# Patient Record
Sex: FEMALE | Race: WHITE | NOT HISPANIC OR LATINO | Employment: FULL TIME | URBAN - METROPOLITAN AREA
[De-identification: names, ages, dates, MRNs, and addresses within clinical notes are randomized per-mention and may not be internally consistent; named-entity substitution may affect disease eponyms.]

---

## 2017-02-21 ENCOUNTER — CONSULT (OUTPATIENT)
Dept: BARIATRICS/WEIGHT MGMT | Facility: CLINIC | Age: 62
End: 2017-02-21

## 2017-02-21 ENCOUNTER — APPOINTMENT (OUTPATIENT)
Dept: LAB | Facility: HOSPITAL | Age: 62
End: 2017-02-21

## 2017-02-21 VITALS
TEMPERATURE: 98.6 F | WEIGHT: 269 LBS | HEART RATE: 76 BPM | BODY MASS INDEX: 44.82 KG/M2 | RESPIRATION RATE: 16 BRPM | DIASTOLIC BLOOD PRESSURE: 82 MMHG | HEIGHT: 65 IN | SYSTOLIC BLOOD PRESSURE: 133 MMHG

## 2017-02-21 DIAGNOSIS — E66.9 DIABETES MELLITUS TYPE 2 IN OBESE (HCC): ICD-10-CM

## 2017-02-21 DIAGNOSIS — K21.9 GASTROESOPHAGEAL REFLUX DISEASE, ESOPHAGITIS PRESENCE NOT SPECIFIED: ICD-10-CM

## 2017-02-21 DIAGNOSIS — E66.01 OBESITY, CLASS III, BMI 40-49.9 (MORBID OBESITY) (HCC): Primary | ICD-10-CM

## 2017-02-21 DIAGNOSIS — G47.33 OSA (OBSTRUCTIVE SLEEP APNEA): ICD-10-CM

## 2017-02-21 DIAGNOSIS — E03.9 HYPOTHYROIDISM, UNSPECIFIED TYPE: ICD-10-CM

## 2017-02-21 DIAGNOSIS — M25.50 MULTIPLE JOINT PAIN: ICD-10-CM

## 2017-02-21 DIAGNOSIS — E11.69 DIABETES MELLITUS TYPE 2 IN OBESE (HCC): ICD-10-CM

## 2017-02-21 DIAGNOSIS — R60.9 EDEMA, UNSPECIFIED TYPE: ICD-10-CM

## 2017-02-21 DIAGNOSIS — R53.82 CHRONIC FATIGUE: ICD-10-CM

## 2017-02-21 DIAGNOSIS — K44.9 HIATAL HERNIA: ICD-10-CM

## 2017-02-21 DIAGNOSIS — Z87.11 HISTORY OF GASTRIC ULCER: ICD-10-CM

## 2017-02-21 PROBLEM — F32.A DEPRESSION: Status: ACTIVE | Noted: 2017-02-21

## 2017-02-21 PROBLEM — I10 ESSENTIAL HYPERTENSION: Status: ACTIVE | Noted: 2017-02-21

## 2017-02-21 PROBLEM — E66.813 OBESITY, CLASS III, BMI 40-49.9 (MORBID OBESITY): Status: ACTIVE | Noted: 2017-02-21

## 2017-02-21 LAB
ALBUMIN SERPL-MCNC: 4.3 G/DL (ref 3.5–5.2)
ALBUMIN/GLOB SERPL: 1.3 G/DL
ALP SERPL-CCNC: 80 U/L (ref 39–117)
ALT SERPL W P-5'-P-CCNC: 17 U/L (ref 1–33)
ANION GAP SERPL CALCULATED.3IONS-SCNC: 13.3 MMOL/L
AST SERPL-CCNC: 18 U/L (ref 1–32)
BASOPHILS # BLD AUTO: 0.03 10*3/MM3 (ref 0–0.2)
BASOPHILS NFR BLD AUTO: 0.4 % (ref 0–1.5)
BILIRUB SERPL-MCNC: 0.4 MG/DL (ref 0.1–1.2)
BUN BLD-MCNC: 9 MG/DL (ref 6–20)
BUN/CREAT SERPL: 11.7 (ref 7–25)
CALCIUM SPEC-SCNC: 9.7 MG/DL (ref 8.6–10.5)
CHLORIDE SERPL-SCNC: 99 MMOL/L (ref 98–107)
CHOLEST SERPL-MCNC: 221 MG/DL (ref 0–200)
CO2 SERPL-SCNC: 26.7 MMOL/L (ref 22–29)
CREAT BLD-MCNC: 0.77 MG/DL (ref 0.57–1)
DEPRECATED RDW RBC AUTO: 45.5 FL (ref 37–54)
EOSINOPHIL # BLD AUTO: 0.13 10*3/MM3 (ref 0–0.7)
EOSINOPHIL NFR BLD AUTO: 1.9 % (ref 0.3–6.2)
ERYTHROCYTE [DISTWIDTH] IN BLOOD BY AUTOMATED COUNT: 13.9 % (ref 11.7–13)
GFR SERPL CREATININE-BSD FRML MDRD: 77 ML/MIN/1.73
GLOBULIN UR ELPH-MCNC: 3.2 GM/DL
GLUCOSE BLD-MCNC: 86 MG/DL (ref 65–99)
HBA1C MFR BLD: 5.46 % (ref 4.8–5.6)
HCT VFR BLD AUTO: 43.6 % (ref 35.6–45.5)
HDLC SERPL-MCNC: 70 MG/DL (ref 40–60)
HGB BLD-MCNC: 14.2 G/DL (ref 11.9–15.5)
IMM GRANULOCYTES # BLD: 0.03 10*3/MM3 (ref 0–0.03)
IMM GRANULOCYTES NFR BLD: 0.4 % (ref 0–0.5)
LDLC SERPL CALC-MCNC: 130 MG/DL (ref 0–100)
LDLC/HDLC SERPL: 1.85 {RATIO}
LYMPHOCYTES # BLD AUTO: 1.52 10*3/MM3 (ref 0.9–4.8)
LYMPHOCYTES NFR BLD AUTO: 22.4 % (ref 19.6–45.3)
MCH RBC QN AUTO: 29.5 PG (ref 26.9–32)
MCHC RBC AUTO-ENTMCNC: 32.6 G/DL (ref 32.4–36.3)
MCV RBC AUTO: 90.6 FL (ref 80.5–98.2)
MONOCYTES # BLD AUTO: 0.44 10*3/MM3 (ref 0.2–1.2)
MONOCYTES NFR BLD AUTO: 6.5 % (ref 5–12)
NEUTROPHILS # BLD AUTO: 4.65 10*3/MM3 (ref 1.9–8.1)
NEUTROPHILS NFR BLD AUTO: 68.4 % (ref 42.7–76)
PLATELET # BLD AUTO: 259 10*3/MM3 (ref 140–500)
PMV BLD AUTO: 11.3 FL (ref 6–12)
POTASSIUM BLD-SCNC: 4.2 MMOL/L (ref 3.5–5.2)
PROT SERPL-MCNC: 7.5 G/DL (ref 6–8.5)
RBC # BLD AUTO: 4.81 10*6/MM3 (ref 3.9–5.2)
SODIUM BLD-SCNC: 139 MMOL/L (ref 136–145)
TRIGL SERPL-MCNC: 106 MG/DL (ref 0–150)
TSH SERPL DL<=0.05 MIU/L-ACNC: 1.98 MIU/ML (ref 0.27–4.2)
VLDLC SERPL-MCNC: 21.2 MG/DL (ref 5–40)
WBC NRBC COR # BLD: 6.8 10*3/MM3 (ref 4.5–10.7)

## 2017-02-21 PROCEDURE — 99205 OFFICE O/P NEW HI 60 MIN: CPT | Performed by: NURSE PRACTITIONER

## 2017-02-21 PROCEDURE — 84443 ASSAY THYROID STIM HORMONE: CPT | Performed by: NURSE PRACTITIONER

## 2017-02-21 PROCEDURE — 36415 COLL VENOUS BLD VENIPUNCTURE: CPT | Performed by: NURSE PRACTITIONER

## 2017-02-21 PROCEDURE — 80061 LIPID PANEL: CPT | Performed by: NURSE PRACTITIONER

## 2017-02-21 PROCEDURE — 83036 HEMOGLOBIN GLYCOSYLATED A1C: CPT | Performed by: NURSE PRACTITIONER

## 2017-02-21 PROCEDURE — 94690 O2 UPTK REST INDIRECT: CPT | Performed by: NURSE PRACTITIONER

## 2017-02-21 PROCEDURE — 80053 COMPREHEN METABOLIC PANEL: CPT | Performed by: NURSE PRACTITIONER

## 2017-02-21 PROCEDURE — 85025 COMPLETE CBC W/AUTO DIFF WBC: CPT | Performed by: NURSE PRACTITIONER

## 2017-02-21 RX ORDER — DULAGLUTIDE 1.5 MG/.5ML
1 INJECTION, SOLUTION SUBCUTANEOUS WEEKLY
COMMUNITY
Start: 2017-01-25 | End: 2017-07-13

## 2017-02-21 NOTE — PROGRESS NOTES
[facility]IATRIC SURGERY  [address]  [place] [address]  [address]  [place] [address]  Dept: [phone]  2/21/2017      Angelina Narayan.  [id number]  [id number]  1955  female      Chief Complaint of weight gain; unable to maintain weight loss    History of Present Illness:   Angelina is a 61 y.o. female who presents today for evaluation, education and consultation regarding weight loss surgery. The patient is interested in the sleeve gastrectomy.      Diet History:Angelina has been overweight for at least 30 years, has been 35 pounds or more overweight for at least 40 years, has been 100 pounds or more overweight for 25 or more years and started dieting at age 32.  The most weight Angelina lost was 50 pounds on WW and maintained the weight loss for 3 months. Angelina describes her eating habits as snacker and sweets eater. Angelina Narayan has tried Atkins, Nutrisystem, [place], Weight Watchers, Fasting and prescription medications among others with success of losing up to 50 pounds, but in each instance regained the weight.    See dietician documentation for complete history.    Bariatric Surgery Evaluation: The patient is being seen for an initial visit for bariatric surgery evaluation.     Bariatric Co-morbidities:  sleep apnea, diabetes, GERD, edema and depression    Patient Active Problem List   Diagnosis   • Obesity, Class III, BMI 40-49.9 (morbid obesity)   • RIVERA (obstructive sleep apnea)   • GERD (gastroesophageal reflux disease)   • Diabetes mellitus type 2 in obese   • Chronic fatigue   • Edema   • Hiatal hernia   • History of gastric ulcer   • Multiple joint pain   • Depression   • Hypothyroidism       Past Medical History   Diagnosis Date   • Anemia    • Constipation    • Diabetes mellitus    • Fatigue    • Fatigue    • GERD (gastroesophageal reflux disease)    • Heartburn    • Hiatal hernia    • Joint pain    • RIVERA (obstructive sleep  apnea)    • Parathyroid disease    • RLS (restless legs syndrome)        Past Surgical History   Procedure Laterality Date   • Hysterectomy     • Tubal abdominal ligation     • Tonsillectomy     • Endoscopy     •  section     • Laparoscopic cholecystectomy     • Colonoscopy     • Dilatation and curettage     • Skin graft         Allergies   Allergen Reactions   • Adhesive Tape    • Morphine And Related    • Sulfa Antibiotics          Current Outpatient Prescriptions:   •  cetirizine (zyrTEC) 10 MG tablet, Take 10 mg by mouth Daily., Disp: , Rfl:   •  hydroxychloroquine (PLAQUENIL) 200 MG tablet, Take  by mouth Daily., Disp: , Rfl:   •  Ibuprofen (ADVIL PO), Take  by mouth., Disp: , Rfl:   •  levothyroxine (SYNTHROID, LEVOTHROID) 100 MCG tablet, Take  by mouth Daily., Disp: , Rfl:   •  lisinopril (PRINIVIL,ZESTRIL) 5 MG tablet, Take 5 mg by mouth Daily., Disp: , Rfl:   •  omeprazole (priLOSEC) 40 MG capsule, Take  by mouth Daily., Disp: , Rfl:   •  TRULICITY 1.5 MG/0.5ML solution pen-injector, Inject 1 application under the skin Take As Directed., Disp: , Rfl:     Social History     Social History   • Marital status:      Spouse name: N/A   • Number of children: 3   • Years of education: N/A     Occupational History   • Not on file.     Social History Main Topics   • Smoking status: Never Smoker   • Smokeless tobacco: Not on file   • Alcohol use No   • Drug use: No   • Sexual activity: Defer     Other Topics Concern   • Not on file     Social History Narrative       Family History   Problem Relation Age of Onset   • Obesity Mother    • Diabetes Mother    • Hypertension Mother    • Sleep apnea Mother    • Hypertension Father    • Stroke Father    • Heart disease Father    • Hypertension Brother    • Heart attack Brother    • Heart disease Brother    • Sleep apnea Brother    • Cancer Brother    • Heart attack Paternal Grandmother    • Breast cancer Maternal Aunt          Review of Systems:  Review of  Systems   All other systems reviewed and are negative.      Physical Exam:  Vital Signs:  Weight: 269 lb (122 kg)   Body mass index is 44.76 kg/(m^2).  Temp: 98.6 °F (37 °C)   Heart Rate: 76   BP: 133/82     Physical Exam   Constitutional: She is oriented to person, place, and time. She appears well-developed and well-nourished.   HENT:   Head: Normocephalic and atraumatic.   Eyes: EOM are normal.   Cardiovascular: Normal rate, regular rhythm and normal heart sounds.    Pulmonary/Chest: Effort normal and breath sounds normal.   Abdominal: Soft. Bowel sounds are normal. She exhibits no distension. There is no tenderness.   Musculoskeletal: Normal range of motion.   Neurological: She is alert and oriented to person, place, and time.   Skin: Skin is warm and dry.   Psychiatric: She has a normal mood and affect. Her behavior is normal. Judgment and thought content normal.   Vitals reviewed.         Assessment:         Angelina Narayan is a 61 y.o. year old female with medically complicated severe obesity. Weight: 269 lb (122 kg), Body mass index is 44.76 kg/(m^2). and weight related problems including sleep apnea, diabetes, GERD, edema and depression.    I explained in detail the procedures that we are performing.  All of those procedures can be performed laparoscopically but there is a chance to convert to open if any technical challenges or complications do occur.  Bariatric surgery is not cosmetic surgery but rather a tool to help a patient make a life-long commitment lifestyle changes including diet, exercise, behavior changes, and taking supplemental vitamins and minerals.    Due to the patient's BMI and co-morbidities they are at a high risk for surgery and will obtain the following:  The patient has been advised that a letter of medical support and a history and physical must be obtained from her primary care physician. A psychological evaluation will be arranged for this patient. CBC, CMP, FLP, TSH and HgbA1C will be  drawn. Angelina Narayan will obtain a pre-operative CXR and EKG.     Angelina Narayan will be set up for a pre-operative diagnostic esophagogastroduodenoscopy with biopsy for evaluation. The risks and benefits of the procedure were discussed with the patient in detail and all questions were answered.  Possibility of perforation, bleeding, aspiration, anoxic brain injury, respiratory and/or cardiac arrest and death were discussed.   She received handouts regarding, all questions were answered and informed consent was obtained.     The risks, benefits, alternatives, and potential complications of all of the procedures were explained in detail including, but not limited to death, anesthesia and medication adverse effect/DVT, pulmonary embolism, trocar site/incisional hernia, wound infection, abdominal infection, bleeding, failure to lose weight or gain weight and change in body image, metabolic complications with calcium, thiamine, vitamin B12, folate, iron, and anemia.    The patient was advised to start a high protein, low fat and low carbohydrate diet. The patient was given individualized information by our dietician along with general group information and handouts.     If the patient had the Basal Metabolic Rate test performed in our office today then I reviewed the results with them including changes to help increase metabolism and a recommended daily caloric intake range- see scanned results.    The patient was given information regarding the JOSE M educational video. JOSE M is an internet based educational video which explains the surgical procedure and answers basic questions regarding the procedure. The patient was provided with instructions and a password to watch the video.    The patient was encouraged to start routine exercise including but not limited to 150 minutes per week. The patient received a resistance band along with a handout of exercises.     The consultation plan was reviewed with the patient.    The patient  understands the surgical procedures and the different surgical options that are available.  She understands the lifestyle changes that would be required after surgery and has agreed to participate in a pre-operative and postoperative weight management program.  She also expressed understanding of possible risks, had several questions answered and desires to proceed.    I think she is a good candidate for this surgery, and is interested in a sleeve gastrectomy.    Plan:    Patient will have evaluations and follow up with bariatric dieticians and a psychologist before undergoing a multidisciplinary review of her candidacy.  We also discussed the weight loss requirement and rationale, and other program requirements.      Kirsten Peterson, APRN  2/21/2017

## 2017-02-21 NOTE — PROGRESS NOTES
"Bariatric Nutrition Counseling Interview    Patient Name:  Angelina Narayan  YOB: 1955  Age:  61 y.o.  Sex:  female  MRN: [id number]  Date:  17    Procedure Considering:  Sleeve    Last Documented Height:    Ht Readings from Last 1 Encounters:   17 65\" (165.1 cm)     Last Documented Weight:   Wt Readings from Last 1 Encounters:   17 269 lb (122 kg)      Body mass index is 44.76 kg/(m^2).    Highest Weight:  287 lbs.  Goal Weight: 145 lb.    History:  Past Medical History   Diagnosis Date   • Constipation    • Diabetes mellitus    • Fatigue    • Fatigue    • GERD (gastroesophageal reflux disease)    • Heartburn    • Hiatal hernia    • Joint pain    • RIVERA (obstructive sleep apnea)    • Parathyroid disease    • RLS (restless legs syndrome)      Past Surgical History   Procedure Laterality Date   • Cholecystectomy     • Hysterectomy     • Tubal abdominal ligation     • Tonsillectomy     • Endoscopy     •  section       Family History   Problem Relation Age of Onset   • Obesity Mother    • Diabetes Mother    • Hypertension Mother    • Sleep apnea Mother    • Hypertension Father    • Stroke Father    • Heart disease Father    • Hypertension Brother    • Heart attack Brother    • Heart disease Brother    • Sleep apnea Brother    • Cancer Brother    • Heart attack Paternal Grandmother    • Breast cancer Maternal Aunt      Social History     Social History   • Marital status:      Spouse name: N/A   • Number of children: N/A   • Years of education: N/A     Social History Main Topics   • Smoking status: Never Smoker   • Smokeless tobacco: Not on file   • Alcohol use No   • Drug use: Not on file   • Sexual activity: Not on file     Other Topics Concern   • Not on file     Social History Narrative     Additional Health Issues to Consider:  Depression, GERD, Diabetes,Hypothyroidism, Hyperlipidemia,joint pain, RA.    Weight History:  Weight gain as a result of an event or condition.Angelina" gained weight following her third pregnanct and hysterectomy    Previous Weight Loss Efforts:  Weight Watchers, Nutrisystem, Phentermine, OTC weight loss aids, My FItness Pal  Most Successful Weight Loss Effort:  Eating Habits: My Fitness Pal  Eat three meals on most days? yes  Worst eating habit?  Snacking on high calorie foods    How often do you eat fast food? weekly    Do you exercise regularly? (at least 3 times each week)  no    Occupation:  , includes some physical activity on a routine basis    Personal Goal After Procedure:  Reduce medications, resolve Diabetes, have more energy to work outsidework outside   Personal Support:        Assessment:  We reviewed program requirements for weight loss following surgery. We discussed personal habits and lifestyle behaviors that may influence weight loss efforts. Program materials, including a reduced calorie diet were provided, discussed and recommended as the regimen to follow for pre and post diet planning. Pat demonstrated a good comprehension of diet requirements as well as a commitment to work on personal challenges. She will make a good candidate for weight loss surgery  Electronically signed by:  Marly Aquino RD  02/21/17 12:59 PM

## 2017-03-21 PROCEDURE — 88305 TISSUE EXAM BY PATHOLOGIST: CPT | Performed by: SURGERY

## 2017-03-21 PROCEDURE — 88312 SPECIAL STAINS GROUP 1: CPT | Performed by: SURGERY

## 2017-03-22 ENCOUNTER — OUTSIDE FACILITY SERVICE (OUTPATIENT)
Dept: BARIATRICS/WEIGHT MGMT | Facility: CLINIC | Age: 62
End: 2017-03-22

## 2017-03-22 ENCOUNTER — LAB REQUISITION (OUTPATIENT)
Dept: LAB | Facility: HOSPITAL | Age: 62
End: 2017-03-22

## 2017-03-22 DIAGNOSIS — K21.9 GASTRO-ESOPHAGEAL REFLUX DISEASE WITHOUT ESOPHAGITIS: ICD-10-CM

## 2017-03-22 PROCEDURE — 43239 EGD BIOPSY SINGLE/MULTIPLE: CPT | Performed by: SURGERY

## 2017-03-22 PROCEDURE — 87081 CULTURE SCREEN ONLY: CPT | Performed by: SURGERY

## 2017-03-23 LAB
CYTO UR: NORMAL
LAB AP CASE REPORT: NORMAL
LAB AP CLINICAL INFORMATION: NORMAL
Lab: NORMAL
PATH REPORT.FINAL DX SPEC: NORMAL
PATH REPORT.GROSS SPEC: NORMAL
UREASE TISS QL: NEGATIVE

## 2017-04-24 ENCOUNTER — HOSPITAL ENCOUNTER (OUTPATIENT)
Dept: GENERAL RADIOLOGY | Facility: HOSPITAL | Age: 62
Discharge: HOME | End: 2017-04-24
Admitting: NURSE PRACTITIONER

## 2017-04-24 ENCOUNTER — HOSPITAL ENCOUNTER (OUTPATIENT)
Dept: CARDIOLOGY | Facility: HOSPITAL | Age: 62
Discharge: HOME | End: 2017-04-24

## 2017-04-24 ENCOUNTER — HOSPITAL ENCOUNTER (OUTPATIENT)
Dept: CARDIOLOGY | Facility: HOSPITAL | Age: 62
End: 2017-04-24

## 2017-04-24 DIAGNOSIS — Z87.11 HISTORY OF GASTRIC ULCER: ICD-10-CM

## 2017-04-24 DIAGNOSIS — G47.33 OSA (OBSTRUCTIVE SLEEP APNEA): ICD-10-CM

## 2017-04-24 DIAGNOSIS — E11.69 DIABETES MELLITUS TYPE 2 IN OBESE (HCC): ICD-10-CM

## 2017-04-24 DIAGNOSIS — K21.9 GASTROESOPHAGEAL REFLUX DISEASE, ESOPHAGITIS PRESENCE NOT SPECIFIED: ICD-10-CM

## 2017-04-24 DIAGNOSIS — M25.50 MULTIPLE JOINT PAIN: ICD-10-CM

## 2017-04-24 DIAGNOSIS — K44.9 HIATAL HERNIA: ICD-10-CM

## 2017-04-24 DIAGNOSIS — E66.01 OBESITY, CLASS III, BMI 40-49.9 (MORBID OBESITY) (HCC): ICD-10-CM

## 2017-04-24 DIAGNOSIS — E03.9 HYPOTHYROIDISM, UNSPECIFIED TYPE: ICD-10-CM

## 2017-04-24 DIAGNOSIS — R60.9 EDEMA, UNSPECIFIED TYPE: ICD-10-CM

## 2017-04-24 DIAGNOSIS — E66.9 DIABETES MELLITUS TYPE 2 IN OBESE (HCC): ICD-10-CM

## 2017-04-24 DIAGNOSIS — R53.82 CHRONIC FATIGUE: ICD-10-CM

## 2017-04-24 PROCEDURE — 93010 ELECTROCARDIOGRAM REPORT: CPT | Performed by: INTERNAL MEDICINE

## 2017-04-24 PROCEDURE — 93005 ELECTROCARDIOGRAM TRACING: CPT | Performed by: NURSE PRACTITIONER

## 2017-04-24 PROCEDURE — 71020 HC CHEST PA AND LATERAL: CPT

## 2017-05-02 ENCOUNTER — HOSPITAL ENCOUNTER (OUTPATIENT)
Dept: SLEEP MEDICINE | Facility: HOSPITAL | Age: 62
Discharge: HOME | End: 2017-05-02
Admitting: INTERNAL MEDICINE

## 2017-05-02 PROCEDURE — G0463 HOSPITAL OUTPT CLINIC VISIT: HCPCS

## 2017-05-19 ENCOUNTER — PREP FOR SURGERY (OUTPATIENT)
Dept: BARIATRICS/WEIGHT MGMT | Facility: CLINIC | Age: 62
End: 2017-05-19

## 2017-05-19 DIAGNOSIS — E66.01 OBESITY, CLASS III, BMI 40-49.9 (MORBID OBESITY) (HCC): Primary | ICD-10-CM

## 2017-05-19 DIAGNOSIS — K44.9 HIATAL HERNIA: ICD-10-CM

## 2017-05-19 RX ORDER — SCOLOPAMINE TRANSDERMAL SYSTEM 1 MG/1
1 PATCH, EXTENDED RELEASE TRANSDERMAL ONCE
Status: CANCELLED | OUTPATIENT
Start: 2017-05-19 | End: 2017-05-19

## 2017-05-19 RX ORDER — SODIUM CHLORIDE 0.9 % (FLUSH) 0.9 %
1-10 SYRINGE (ML) INJECTION AS NEEDED
Status: CANCELLED | OUTPATIENT
Start: 2017-05-19

## 2017-05-19 RX ORDER — CHLORHEXIDINE GLUCONATE 0.12 MG/ML
15 RINSE ORAL SEE ADMIN INSTRUCTIONS
Status: CANCELLED | OUTPATIENT
Start: 2017-05-19

## 2017-05-19 RX ORDER — SODIUM CHLORIDE, SODIUM LACTATE, POTASSIUM CHLORIDE, CALCIUM CHLORIDE 600; 310; 30; 20 MG/100ML; MG/100ML; MG/100ML; MG/100ML
100 INJECTION, SOLUTION INTRAVENOUS CONTINUOUS
Status: CANCELLED | OUTPATIENT
Start: 2017-05-19

## 2017-05-19 RX ORDER — PANTOPRAZOLE SODIUM 40 MG/10ML
40 INJECTION, POWDER, LYOPHILIZED, FOR SOLUTION INTRAVENOUS ONCE
Status: CANCELLED | OUTPATIENT
Start: 2017-05-19 | End: 2017-05-19

## 2017-06-01 ENCOUNTER — CONSULT (OUTPATIENT)
Dept: BARIATRICS/WEIGHT MGMT | Facility: CLINIC | Age: 62
End: 2017-06-01

## 2017-06-01 VITALS
DIASTOLIC BLOOD PRESSURE: 84 MMHG | RESPIRATION RATE: 16 BRPM | BODY MASS INDEX: 45.32 KG/M2 | HEIGHT: 65 IN | SYSTOLIC BLOOD PRESSURE: 135 MMHG | HEART RATE: 80 BPM | WEIGHT: 272 LBS | TEMPERATURE: 98.7 F

## 2017-06-01 DIAGNOSIS — K44.9 HIATAL HERNIA: ICD-10-CM

## 2017-06-01 DIAGNOSIS — K31.7 GASTRIC POLYPS: ICD-10-CM

## 2017-06-01 DIAGNOSIS — R53.82 CHRONIC FATIGUE: ICD-10-CM

## 2017-06-01 DIAGNOSIS — E11.69 DIABETES MELLITUS TYPE 2 IN OBESE (HCC): ICD-10-CM

## 2017-06-01 DIAGNOSIS — G47.33 OSA (OBSTRUCTIVE SLEEP APNEA): ICD-10-CM

## 2017-06-01 DIAGNOSIS — E66.9 DIABETES MELLITUS TYPE 2 IN OBESE (HCC): ICD-10-CM

## 2017-06-01 DIAGNOSIS — E66.01 OBESITY, CLASS III, BMI 40-49.9 (MORBID OBESITY) (HCC): Primary | ICD-10-CM

## 2017-06-01 DIAGNOSIS — M25.50 MULTIPLE JOINT PAIN: ICD-10-CM

## 2017-06-01 DIAGNOSIS — K21.9 GASTROESOPHAGEAL REFLUX DISEASE WITHOUT ESOPHAGITIS: ICD-10-CM

## 2017-06-01 PROCEDURE — 99215 OFFICE O/P EST HI 40 MIN: CPT | Performed by: SURGERY

## 2017-06-01 RX ORDER — FOLIC ACID 1 MG/1
1 TABLET ORAL DAILY
Qty: 40 TABLET | Refills: 0 | Status: SHIPPED | OUTPATIENT
Start: 2017-06-01 | End: 2017-12-06

## 2017-06-01 RX ORDER — ACETAMINOPHEN,DIPHENHYDRAMINE HCL 500; 25 MG/1; MG/1
1 TABLET, FILM COATED ORAL NIGHTLY PRN
COMMUNITY
End: 2017-06-13 | Stop reason: HOSPADM

## 2017-06-01 RX ORDER — GABAPENTIN 600 MG/1
600 TABLET ORAL NIGHTLY
COMMUNITY
Start: 2017-05-02 | End: 2018-03-07

## 2017-06-01 NOTE — PATIENT INSTRUCTIONS
Bariatric Manual    You were provided a manual specific to the procedure that you have chosen.  Please refer to that with any questions or call the office at [phone]

## 2017-06-01 NOTE — H&P
Bariatric Consult:  Referred by WALT Hatfield    Angelina Narayan is here today for consult on Consult (Morbid obesity with co morbidities who presents for gastric sleeve surgery consult)      History of Present Illness:     Angelina Narayan is a 61 y.o. female with morbid obesity with co-morbidities who presents for surgical consultation for the above procedure. Angelina has completed the initial intake visit and has been examined by our nurse practitioner, dietician, psychologist and underwent the extensive educational teaching process under the guidance of our bariatric coordinator and myself. Angelina also has seen the educational video JOSE M on the surgical procedure if available. Angelina attended today more educational teaching from our bariatric coordinator and myself. Angelina has had an extensive medical workup including a visit with their primary care physician, EKG, chest radiograph, blood work, EGD or UGI and possibly further testing. These have been reviewed by me and discussed with the patient. Angelina is now ready to proceed with surgery. Angelina presently denies nausea, vomiting, fever, chills, chest pain, shortness of air, melena, hematochezia, hemetemesis, dysuria, frequency, hematuria, jaundice or abdominal pain.       Past Medical History:   Diagnosis Date   • Anemia    • Constipation    • Diabetes mellitus    • Fatigue    • Fatigue    • GERD (gastroesophageal reflux disease)    • Heartburn    • Hiatal hernia    • Joint pain    • RIVERA (obstructive sleep apnea)    • Parathyroid disease    • RLS (restless legs syndrome)        Encounter Diagnoses   Name Primary?   • Obesity, Class III, BMI 40-49.9 (morbid obesity) Yes   • Diabetes mellitus type 2 in obese    • Hiatal hernia    • Gastric polyps    • Multiple joint pain    • Chronic fatigue    • Gastroesophageal reflux disease without esophagitis    • RIVERA (obstructive sleep apnea)        Past Surgical History:   Procedure Laterality Date   •  SECTION     •  COLONOSCOPY     • DILATATION AND CURETTAGE     • ENDOSCOPY     • HYSTERECTOMY     • LAPAROSCOPIC CHOLECYSTECTOMY     • SKIN GRAFT     • TONSILLECTOMY     • TUBAL ABDOMINAL LIGATION         Patient Active Problem List   Diagnosis   • Obesity, Class III, BMI 40-49.9 (morbid obesity)   • RIVERA (obstructive sleep apnea)   • GERD (gastroesophageal reflux disease)   • Diabetes mellitus type 2 in obese   • Chronic fatigue   • Edema   • Hiatal hernia   • History of gastric ulcer   • Multiple joint pain   • Depression   • Hypothyroidism   • Gastric polyps       Allergies   Allergen Reactions   • Adhesive Tape    • Morphine And Related    • Sulfa Antibiotics          Current Outpatient Prescriptions:   •  cetirizine (zyrTEC) 10 MG tablet, Take 10 mg by mouth Daily., Disp: , Rfl:   •  diphenhydrAMINE-acetaminophen (TYLENOL PM)  MG tablet per tablet, Take 1 tablet by mouth At Night As Needed for Sleep., Disp: , Rfl:   •  hydroxychloroquine (PLAQUENIL) 200 MG tablet, Take  by mouth Daily., Disp: , Rfl:   •  levothyroxine (SYNTHROID, LEVOTHROID) 100 MCG tablet, Take  by mouth Daily., Disp: , Rfl:   •  lisinopril (PRINIVIL,ZESTRIL) 5 MG tablet, Take 5 mg by mouth Daily., Disp: , Rfl:   •  omeprazole (priLOSEC) 40 MG capsule, Take  by mouth Daily., Disp: , Rfl:   •  TRULICITY 1.5 MG/0.5ML solution pen-injector, Inject 1 application under the skin Take As Directed., Disp: , Rfl:   •  folic acid (FOLVITE) 1 MG tablet, Take 1 tablet by mouth Daily., Disp: 40 tablet, Rfl: 0  •  gabapentin (NEURONTIN) 600 MG tablet, , Disp: , Rfl:     Social History     Social History   • Marital status:      Spouse name: N/A   • Number of children: 3   • Years of education: N/A     Occupational History   • Not on file.     Social History Main Topics   • Smoking status: Never Smoker   • Smokeless tobacco: Not on file   • Alcohol use No   • Drug use: No   • Sexual activity: Defer     Other Topics Concern   • Not on file     Social History  Narrative       Family History   Problem Relation Age of Onset   • Obesity Mother    • Diabetes Mother    • Hypertension Mother    • Sleep apnea Mother    • Hypertension Father    • Stroke Father    • Heart disease Father    • Hypertension Brother    • Heart attack Brother    • Heart disease Brother    • Sleep apnea Brother    • Cancer Brother    • Heart attack Paternal Grandmother    • Breast cancer Maternal Aunt        Review of Systems:  Review of Systems   Constitutional: Positive for fatigue.   Musculoskeletal: Positive for arthralgias.   All other systems reviewed and are negative.        Physical Exam:    Vital Signs:  Weight: 272 lb (123 kg)   Body mass index is 45.26 kg/(m^2).  Temp: 98.7 °F (37.1 °C)   Heart Rate: 80   BP: 135/84       Physical Exam   Constitutional: She is oriented to person, place, and time. She appears well-nourished.   HENT:   Head: Normocephalic and atraumatic.   Mouth/Throat: Oropharynx is clear and moist.   Eyes: Conjunctivae and EOM are normal. Pupils are equal, round, and reactive to light. No scleral icterus.   Neck: Normal range of motion. Neck supple. No thyromegaly present.   Cardiovascular: Normal rate and regular rhythm.    Pulmonary/Chest: Effort normal and breath sounds normal.   Abdominal: Soft. Bowel sounds are normal. She exhibits no distension and no mass. There is no tenderness. There is no rebound and no guarding.   Musculoskeletal: Normal range of motion. She exhibits edema.   Lymphadenopathy:     She has no cervical adenopathy.   Neurological: She is alert and oriented to person, place, and time. No cranial nerve deficit. Coordination normal.   Skin: Skin is warm and dry. No erythema.   Psychiatric: She has a normal mood and affect. Her behavior is normal.   Vitals reviewed.        Assessment:    Angelina Narayan is a 61 y.o. year old female with medically complicated severe obesity with a BMI of Body mass index is 45.26 kg/(m^2). and multiple comorbidities.    I think  she is an appropriate candidate for this surgery, and is ready to proceed.      Plan/Discussion/Summary:  Hiatal Hernia per me.  Patient does take PPI.  Helicobacter pylori negative.  Gastric polyps benign      The patient has returned to the office for a surgical consultation and has requested to proceed with a laparoscopic gastric sleeve.  I have had the opportunity to obtain a history, examine the patient and review the patient's chart.    The patient understands that surgery is a tool and that weight loss is not guaranteed but only seen in the context of appropriate use, regular follow up, exercise and making appropriate food choices.     I personally discussed the potential complications of the laparoscopic gastric sleeve with this patient.  The patient is well aware of potential complications of the surgery that include but not limited to bleeding, infections, deep vein thrombosis, pulmonary embolism, pulmonary complications such as pneumonia, cardiac event, hernias, small bowel obstruction, damage to the spleen or other organs, bowel injury, disfiguring scars, failure to lose weight, need for additional surgery, conversion to an open procedure and death.  The patient is also aware of complications which apply in particular to the gastric sleeve and can include but not limited to the leakage of gastric contents at the staple line, the development of an intra-abdominal abscess, gastroesophageal reflux disease, Humphreys's esophagus, ulcers, vitamin/mineral deficiencies, strictures, and the possibility of converting this procedure to a Dmitriy-en-Y gastric bypass. The patient also understands the possibility of requiring an acid reducer medication for the rest of their life.    The risks, benefits, potential complications and alternative therapies were discussed at great length as outlined in our extensive consent forms, online consent and educational teaching processes.    The patient has confirmed the participation  in the programs extensive educational activities.    All questions and concerns were answered to patient's satisfaction.  The patient now wishes to proceed with surgery.    Patient has declined the pre-operative insertion of an IVC filter.     The patient has declined a postoperative course of anitcoagulant therapy.        I explained in detail the procedures that we perform.  All of these procedures have a chance to convert to open if any technical challenges or complications do occur.  Bariatric surgery is not cosmetic surgery but rather a tool to help a patient make a life-long commitment lifestyle change including diet, exercise, behavior changes, and taking supplemental vitamins and minerals.    Problems after surgery may require more operations to correct them.    The risks, benefits, alternatives, and potential complications of all of the procedures were explained in detail including, but not limited to death, anesthesia and medication adverse effect, deep venous thrombosis, pulmonary embolism, trocar site/incisional hernia, wound infection, abdominal infection, bleeding, failure to lose weight, gain weight, a change in body image, metabolic complications with vitamin deficiences and anemia.    Weight loss expectations were discussed with the patient in detail. The weight loss operations most commonly performed are the sleeve gastrectomy and the Dmitriy-en-Y gastric bypass. These operations result in weight losses up to approximately 25-35% of initial body weight 12 to 24 months after surgery with the gastric bypass usually the higher percent of weight loss. The longitudinal data shows maintenance of 75% of lost weight after 10 years for the gastric bypass.    For the gastric bypass and loop duodenal switch (BRAD-S) the risks include but not limited to the following early complications:  Anastomotic leak/peritonitis, Dmitriy/Alimentary/biliopancreatic limb obstruction, severe & minor wound infection/seroma, and  nausea/vomiting.  Late complications can include but are not limited to malnutrition, vitamin deficiencies, frequent loose stools,  stomal stenosis, marginal ulcer, bowel obstruction, intussusception, internal, and incisional hernia.    Regarding the gastric sleeve, there is less long-term outcome data and higher risk of dysphagia and reflux compared to a gastric bypass, as well as risk of internal visceral/organ injury, splenectomy, bleeding, infection, leak (which could require further intervention possible conversion to Dmitriy-en-Y gastric bypass), stenosis and possibility of regaining weight.    Angelina was counseled regarding diagnostic results, instructions for management, risk factor reductions, prognosis, patient and family education, impressions, risks and benefits of treatment options and importance of compliance with treatment. Total face to face time of the encounter was over 45 minutes and over 30 minutes was spent counseling.     Jessie Report   As part of this patient's treatment plan I am prescribing controlled substances. The patient has been made aware of appropriate use of such medications, including potential risk of somnolence, limited ability to drive and /or work safely, and potential for dependence or overdose. It has also been made clear that these medications are for use by this patient only, without concomitant use of alcohol or other substances unless prescribed.    Angelina has completed prescribing agreement detailing terms of continued prescribing of controlled substances, including monitoring JESSIE reports, urine drug screening, and pill counts if necessary. Angelina is aware that inappropriate use will result in cessation of prescribing such medications.    JESSIE report has been reviewed      History and physical exam exhibit continued safe and appropriate use of controlled substances.      Angelina understands the surgical procedures and the different surgical options that are available.  She  understands the lifestyle changes that are required after surgery and has agreed to follow the guidelines outlined in the weight management program.  She also expressed understanding of the risks involved and had all of female questions answered and desires to proceed.      Job Douglass MD  6/1/2017

## 2017-06-05 ENCOUNTER — APPOINTMENT (OUTPATIENT)
Dept: PREADMISSION TESTING | Facility: HOSPITAL | Age: 62
End: 2017-06-05

## 2017-06-05 VITALS
HEIGHT: 65 IN | SYSTOLIC BLOOD PRESSURE: 103 MMHG | OXYGEN SATURATION: 96 % | TEMPERATURE: 97.7 F | RESPIRATION RATE: 16 BRPM | DIASTOLIC BLOOD PRESSURE: 74 MMHG | HEART RATE: 76 BPM

## 2017-06-05 DIAGNOSIS — K44.9 HIATAL HERNIA: ICD-10-CM

## 2017-06-05 DIAGNOSIS — E66.01 OBESITY, CLASS III, BMI 40-49.9 (MORBID OBESITY) (HCC): ICD-10-CM

## 2017-06-05 LAB
ALBUMIN SERPL-MCNC: 4.4 G/DL (ref 3.5–5.2)
ALBUMIN/GLOB SERPL: 1.3 G/DL
ALP SERPL-CCNC: 72 U/L (ref 39–117)
ALT SERPL W P-5'-P-CCNC: 17 U/L (ref 1–33)
ANION GAP SERPL CALCULATED.3IONS-SCNC: 12.5 MMOL/L
AST SERPL-CCNC: 21 U/L (ref 1–32)
BILIRUB SERPL-MCNC: 0.5 MG/DL (ref 0.1–1.2)
BUN BLD-MCNC: 13 MG/DL (ref 6–20)
BUN/CREAT SERPL: 16.5 (ref 7–25)
CALCIUM SPEC-SCNC: 9.7 MG/DL (ref 8.6–10.5)
CHLORIDE SERPL-SCNC: 95 MMOL/L (ref 98–107)
CO2 SERPL-SCNC: 27.5 MMOL/L (ref 22–29)
CREAT BLD-MCNC: 0.79 MG/DL (ref 0.57–1)
DEPRECATED RDW RBC AUTO: 43.4 FL (ref 37–54)
ERYTHROCYTE [DISTWIDTH] IN BLOOD BY AUTOMATED COUNT: 13.2 % (ref 11.7–13)
GFR SERPL CREATININE-BSD FRML MDRD: 74 ML/MIN/1.73
GLOBULIN UR ELPH-MCNC: 3.3 GM/DL
GLUCOSE BLD-MCNC: 104 MG/DL (ref 65–99)
HCT VFR BLD AUTO: 42.5 % (ref 35.6–45.5)
HGB BLD-MCNC: 13.8 G/DL (ref 11.9–15.5)
MCH RBC QN AUTO: 29.7 PG (ref 26.9–32)
MCHC RBC AUTO-ENTMCNC: 32.5 G/DL (ref 32.4–36.3)
MCV RBC AUTO: 91.6 FL (ref 80.5–98.2)
PLATELET # BLD AUTO: 223 10*3/MM3 (ref 140–500)
PMV BLD AUTO: 11.3 FL (ref 6–12)
POTASSIUM BLD-SCNC: 4 MMOL/L (ref 3.5–5.2)
PROT SERPL-MCNC: 7.7 G/DL (ref 6–8.5)
RBC # BLD AUTO: 4.64 10*6/MM3 (ref 3.9–5.2)
SODIUM BLD-SCNC: 135 MMOL/L (ref 136–145)
WBC NRBC COR # BLD: 6.94 10*3/MM3 (ref 4.5–10.7)

## 2017-06-05 PROCEDURE — 80053 COMPREHEN METABOLIC PANEL: CPT | Performed by: SURGERY

## 2017-06-05 PROCEDURE — 36415 COLL VENOUS BLD VENIPUNCTURE: CPT

## 2017-06-05 PROCEDURE — 85027 COMPLETE CBC AUTOMATED: CPT | Performed by: SURGERY

## 2017-06-05 NOTE — DISCHARGE INSTRUCTIONS
Take the following medications the morning of surgery with a small sip of water:    General Instructions:   • You may have up to 20 oz of clear-artificially sweetened liquid (this includes    G2 Gatorade, Water, Tea/Coffee with no cream or milk added).  Drink must be completed 4 hours before the start of your surgery- nothing to drink within 4 hours of surgery.  Nothing red in color.    • Patients who avoid smoking, chewing tobacco and alcohol for 4 weeks prior to surgery have a reduced risk of post-operative complications.  Quit smoking as many days before surgery as you can.  • Do not smoke, use chewing tobacco or drink alcohol the day of surgery.   • If applicable bring your C-PAP/ BI-PAP machine.  • Bring any papers given to you in the doctor’s office.  • Wear clean comfortable clothes and socks.  • Do not wear contact lenses or make-up.  Bring a case for your glasses.   • Bring crutches or walker if applicable.  • Leave all other valuables and jewelry at home.  • The Pre-Admission Testing nurse will instruct you to bring medications if unable to obtain an accurate list in Pre-Admission Testing.        If you were given a blood bank ID arm band remember to bring it with you the day of surgery.    Preventing a Surgical Site Infection:  • For 2 to 3 days before surgery, avoid shaving with a razor because the razor can irritate skin and make it easier to develop an infection.  • The night prior to surgery sleep in a clean bed with clean clothing.  Do not allow pets to sleep with you.  • Shower on the morning of surgery using a fresh bar of anti-bacterial soap (such as Dial) and clean washcloth.  Dry with a clean towel and dress in clean clothing.  • Ask your surgeon if you will be receiving antibiotics prior to surgery.  • Make sure you, your family, and all healthcare providers clean their hands with soap and water or an alcohol based hand  before caring for you or your wound.    Day of surgery:  Upon  arrival, a Pre-op nurse and Anesthesiologist will review your health history, obtain vital signs, and answer questions you may have.  The only belongings needed at this time will be your home medications and if applicable your C-PAP/BI-PAP machine.  If you are staying overnight your family can leave the rest of your belongings in the car and bring them to your room later.  A Pre-op nurse will start an IV and you may receive medication in preparation for surgery, including something to help you relax.  Your family will be able to see you in the Pre-op area.  While you are in surgery your family should notify the waiting room  if they leave the waiting room area and provide a contact phone number.    Please be aware that surgery does come with discomfort.  We want to make every effort to control your discomfort so please discuss any uncontrolled symptoms with your nurse.   Your doctor will most likely have prescribed pain medications.      If you are going home after surgery you will receive individualized written care instructions before being discharged.  A responsible adult must drive you to and from the hospital on the day of your surgery and stay with you for 24 hours.    If you are staying overnight following surgery, you will be transported to your hospital room following the recovery period.  [facility] has all private rooms.    If you have any questions please call Pre-Admission Testing at [phone].  Deductibles and co-payments are collected on the day of service. Please be prepared to pay the required co-pay, deductible or deposit on the day of service as defined by your plan.

## 2017-06-12 ENCOUNTER — ANESTHESIA EVENT (OUTPATIENT)
Dept: PERIOP | Facility: HOSPITAL | Age: 62
End: 2017-06-12

## 2017-06-12 ENCOUNTER — HOSPITAL ENCOUNTER (INPATIENT)
Facility: HOSPITAL | Age: 62
LOS: 1 days | Discharge: HOME | End: 2017-06-13
Attending: SURGERY | Admitting: SURGERY

## 2017-06-12 ENCOUNTER — ANESTHESIA (OUTPATIENT)
Dept: PERIOP | Facility: HOSPITAL | Age: 62
End: 2017-06-12

## 2017-06-12 DIAGNOSIS — E66.9 DIABETES MELLITUS TYPE 2 IN OBESE (HCC): ICD-10-CM

## 2017-06-12 DIAGNOSIS — G47.33 OSA (OBSTRUCTIVE SLEEP APNEA): ICD-10-CM

## 2017-06-12 DIAGNOSIS — Z87.11 HISTORY OF GASTRIC ULCER: ICD-10-CM

## 2017-06-12 DIAGNOSIS — K44.9 HIATAL HERNIA: ICD-10-CM

## 2017-06-12 DIAGNOSIS — R53.82 CHRONIC FATIGUE: ICD-10-CM

## 2017-06-12 DIAGNOSIS — M25.50 MULTIPLE JOINT PAIN: ICD-10-CM

## 2017-06-12 DIAGNOSIS — R60.9 EDEMA, UNSPECIFIED TYPE: ICD-10-CM

## 2017-06-12 DIAGNOSIS — E11.69 DIABETES MELLITUS TYPE 2 IN OBESE (HCC): ICD-10-CM

## 2017-06-12 DIAGNOSIS — E66.01 OBESITY, CLASS III, BMI 40-49.9 (MORBID OBESITY) (HCC): ICD-10-CM

## 2017-06-12 DIAGNOSIS — E03.9 HYPOTHYROIDISM, UNSPECIFIED TYPE: ICD-10-CM

## 2017-06-12 DIAGNOSIS — K21.9 GASTROESOPHAGEAL REFLUX DISEASE WITHOUT ESOPHAGITIS: ICD-10-CM

## 2017-06-12 DIAGNOSIS — K31.7 GASTRIC POLYPS: ICD-10-CM

## 2017-06-12 LAB
GLUCOSE BLDC GLUCOMTR-MCNC: 119 MG/DL (ref 70–130)
GLUCOSE BLDC GLUCOMTR-MCNC: 122 MG/DL (ref 70–130)
GLUCOSE BLDC GLUCOMTR-MCNC: 93 MG/DL (ref 70–130)

## 2017-06-12 PROCEDURE — 25010000002 MIDAZOLAM PER 1 MG: Performed by: ANESTHESIOLOGY

## 2017-06-12 PROCEDURE — 25010000003 CEFAZOLIN IN DEXTROSE 2-4 GM/100ML-% SOLUTION: Performed by: SURGERY

## 2017-06-12 PROCEDURE — 94799 UNLISTED PULMONARY SVC/PX: CPT

## 2017-06-12 PROCEDURE — 25010000002 HYDROMORPHONE PER 4 MG

## 2017-06-12 PROCEDURE — 0DB64Z3 EXCISION OF STOMACH, PERCUTANEOUS ENDOSCOPIC APPROACH, VERTICAL: ICD-10-PCS | Performed by: SURGERY

## 2017-06-12 PROCEDURE — 82962 GLUCOSE BLOOD TEST: CPT

## 2017-06-12 PROCEDURE — 25010000002 METOCLOPRAMIDE PER 10 MG: Performed by: SURGERY

## 2017-06-12 PROCEDURE — 0BQS4ZZ REPAIR LEFT DIAPHRAGM, PERCUTANEOUS ENDOSCOPIC APPROACH: ICD-10-PCS | Performed by: SURGERY

## 2017-06-12 PROCEDURE — 25010000002 PROPOFOL 10 MG/ML EMULSION: Performed by: NURSE ANESTHETIST, CERTIFIED REGISTERED

## 2017-06-12 PROCEDURE — 0DNW4ZZ RELEASE PERITONEUM, PERCUTANEOUS ENDOSCOPIC APPROACH: ICD-10-PCS | Performed by: SURGERY

## 2017-06-12 PROCEDURE — 43775 LAP SLEEVE GASTRECTOMY: CPT | Performed by: SURGERY

## 2017-06-12 PROCEDURE — 25010000002 ONDANSETRON PER 1 MG: Performed by: NURSE ANESTHETIST, CERTIFIED REGISTERED

## 2017-06-12 PROCEDURE — 25010000002 NEOSTIGMINE PER 0.5 MG: Performed by: NURSE ANESTHETIST, CERTIFIED REGISTERED

## 2017-06-12 PROCEDURE — 25010000002 ENOXAPARIN PER 10 MG: Performed by: SURGERY

## 2017-06-12 PROCEDURE — 94640 AIRWAY INHALATION TREATMENT: CPT

## 2017-06-12 PROCEDURE — 25010000002 METOCLOPRAMIDE PER 10 MG

## 2017-06-12 PROCEDURE — 0BQR4ZZ REPAIR RIGHT DIAPHRAGM, PERCUTANEOUS ENDOSCOPIC APPROACH: ICD-10-PCS | Performed by: SURGERY

## 2017-06-12 PROCEDURE — 25010000002 ONDANSETRON PER 1 MG: Performed by: SURGERY

## 2017-06-12 PROCEDURE — 25010000002 FENTANYL CITRATE (PF) 100 MCG/2ML SOLUTION: Performed by: NURSE ANESTHETIST, CERTIFIED REGISTERED

## 2017-06-12 PROCEDURE — 43775 LAP SLEEVE GASTRECTOMY: CPT | Performed by: NURSE PRACTITIONER

## 2017-06-12 PROCEDURE — 25010000002 HYDROMORPHONE PER 4 MG: Performed by: ANESTHESIOLOGY

## 2017-06-12 DEVICE — SEALANT FIBRIN TISSEEL FZ 4ML: Type: IMPLANTABLE DEVICE | Site: STOMACH | Status: FUNCTIONAL

## 2017-06-12 DEVICE — PERI-STRIPS DRY WITH VERITAS COLLAGEN MATRIX (PSD-V) IS PREPARED FROM DEHYDRATED BOVINE PERICARDIUM PROCURED FROM CATTLE UNDER 30 MONTHS OF AGE IN THE UNITED STATES. ONE (1) TUBE OF PSD GEL (GEL) IS PROVIDED FOR EVERY TWO (2) POUCHES OF PSD-V. THE GEL IS USED TO CREATE A TEMPORARY BOND BETWEEN THE PSD-V BUTTRESS AND THE SURGICAL STAPLER JAWS UNTIL THE STAPLER IS POSITIONED AND FIRED.
Type: IMPLANTABLE DEVICE | Site: STOMACH | Status: FUNCTIONAL
Brand: PERI-STRIPS DRY WITH VERITAS COLLAGEN MATRIX

## 2017-06-12 RX ORDER — LORAZEPAM 2 MG/ML
1 INJECTION INTRAMUSCULAR EVERY 12 HOURS PRN
Status: DISCONTINUED | OUTPATIENT
Start: 2017-06-12 | End: 2017-06-13 | Stop reason: HOSPADM

## 2017-06-12 RX ORDER — SODIUM CHLORIDE, SODIUM LACTATE, POTASSIUM CHLORIDE, CALCIUM CHLORIDE 600; 310; 30; 20 MG/100ML; MG/100ML; MG/100ML; MG/100ML
9 INJECTION, SOLUTION INTRAVENOUS CONTINUOUS
Status: DISCONTINUED | OUTPATIENT
Start: 2017-06-12 | End: 2017-06-12 | Stop reason: HOSPADM

## 2017-06-12 RX ORDER — PROPOFOL 10 MG/ML
VIAL (ML) INTRAVENOUS AS NEEDED
Status: DISCONTINUED | OUTPATIENT
Start: 2017-06-12 | End: 2017-06-12 | Stop reason: SURG

## 2017-06-12 RX ORDER — ONDANSETRON 2 MG/ML
4 INJECTION INTRAMUSCULAR; INTRAVENOUS EVERY 6 HOURS PRN
Status: DISCONTINUED | OUTPATIENT
Start: 2017-06-12 | End: 2017-06-13 | Stop reason: HOSPADM

## 2017-06-12 RX ORDER — CEFAZOLIN SODIUM IN 0.9 % NACL 3 G/100 ML
3 INTRAVENOUS SOLUTION, PIGGYBACK (ML) INTRAVENOUS ONCE
Status: COMPLETED | OUTPATIENT
Start: 2017-06-12 | End: 2017-06-12

## 2017-06-12 RX ORDER — PANTOPRAZOLE SODIUM 40 MG/10ML
40 INJECTION, POWDER, LYOPHILIZED, FOR SOLUTION INTRAVENOUS
Status: DISCONTINUED | OUTPATIENT
Start: 2017-06-13 | End: 2017-06-13 | Stop reason: HOSPADM

## 2017-06-12 RX ORDER — CLONIDINE HYDROCHLORIDE 0.1 MG/1
0.1 TABLET ORAL EVERY 6 HOURS PRN
Status: DISCONTINUED | OUTPATIENT
Start: 2017-06-12 | End: 2017-06-13 | Stop reason: HOSPADM

## 2017-06-12 RX ORDER — BUPIVACAINE HYDROCHLORIDE AND EPINEPHRINE 5; 5 MG/ML; UG/ML
INJECTION, SOLUTION PERINEURAL AS NEEDED
Status: DISCONTINUED | OUTPATIENT
Start: 2017-06-12 | End: 2017-06-12 | Stop reason: HOSPADM

## 2017-06-12 RX ORDER — FAMOTIDINE 10 MG/ML
20 INJECTION, SOLUTION INTRAVENOUS ONCE
Status: COMPLETED | OUTPATIENT
Start: 2017-06-12 | End: 2017-06-12

## 2017-06-12 RX ORDER — MIDAZOLAM HYDROCHLORIDE 1 MG/ML
2 INJECTION INTRAMUSCULAR; INTRAVENOUS
Status: DISCONTINUED | OUTPATIENT
Start: 2017-06-12 | End: 2017-06-12 | Stop reason: HOSPADM

## 2017-06-12 RX ORDER — HYDROCODONE BITARTRATE AND ACETAMINOPHEN 7.5; 325 MG/1; MG/1
1 TABLET ORAL ONCE AS NEEDED
Status: DISCONTINUED | OUTPATIENT
Start: 2017-06-12 | End: 2017-06-12 | Stop reason: HOSPADM

## 2017-06-12 RX ORDER — DIPHENHYDRAMINE HYDROCHLORIDE 50 MG/ML
25 INJECTION INTRAMUSCULAR; INTRAVENOUS EVERY 4 HOURS PRN
Status: DISCONTINUED | OUTPATIENT
Start: 2017-06-12 | End: 2017-06-13 | Stop reason: HOSPADM

## 2017-06-12 RX ORDER — LIDOCAINE HYDROCHLORIDE 20 MG/ML
INJECTION, SOLUTION INFILTRATION; PERINEURAL AS NEEDED
Status: DISCONTINUED | OUTPATIENT
Start: 2017-06-12 | End: 2017-06-12 | Stop reason: SURG

## 2017-06-12 RX ORDER — LEVOTHYROXINE SODIUM 0.1 MG/1
100 TABLET ORAL EVERY MORNING
Status: DISCONTINUED | OUTPATIENT
Start: 2017-06-13 | End: 2017-06-13 | Stop reason: HOSPADM

## 2017-06-12 RX ORDER — SCOLOPAMINE TRANSDERMAL SYSTEM 1 MG/1
1 PATCH, EXTENDED RELEASE TRANSDERMAL ONCE
Status: DISCONTINUED | OUTPATIENT
Start: 2017-06-12 | End: 2017-06-12

## 2017-06-12 RX ORDER — ONDANSETRON 2 MG/ML
4 INJECTION INTRAMUSCULAR; INTRAVENOUS ONCE AS NEEDED
Status: DISCONTINUED | OUTPATIENT
Start: 2017-06-12 | End: 2017-06-12 | Stop reason: HOSPADM

## 2017-06-12 RX ORDER — CEFAZOLIN SODIUM 2 G/100ML
2 INJECTION, SOLUTION INTRAVENOUS EVERY 8 HOURS
Status: COMPLETED | OUTPATIENT
Start: 2017-06-12 | End: 2017-06-13

## 2017-06-12 RX ORDER — CYANOCOBALAMIN 1000 UG/ML
1000 INJECTION, SOLUTION INTRAMUSCULAR; SUBCUTANEOUS ONCE
Status: COMPLETED | OUTPATIENT
Start: 2017-06-13 | End: 2017-06-13

## 2017-06-12 RX ORDER — SODIUM CHLORIDE 0.9 % (FLUSH) 0.9 %
1-10 SYRINGE (ML) INJECTION AS NEEDED
Status: DISCONTINUED | OUTPATIENT
Start: 2017-06-12 | End: 2017-06-12 | Stop reason: HOSPADM

## 2017-06-12 RX ORDER — CHLORHEXIDINE GLUCONATE 0.12 MG/ML
15 RINSE ORAL SEE ADMIN INSTRUCTIONS
Status: COMPLETED | OUTPATIENT
Start: 2017-06-12 | End: 2017-06-12

## 2017-06-12 RX ORDER — DIPHENHYDRAMINE HYDROCHLORIDE 50 MG/ML
6.25 INJECTION INTRAMUSCULAR; INTRAVENOUS
Status: DISCONTINUED | OUTPATIENT
Start: 2017-06-12 | End: 2017-06-12 | Stop reason: HOSPADM

## 2017-06-12 RX ORDER — HYDROMORPHONE HYDROCHLORIDE 1 MG/ML
0.5 INJECTION, SOLUTION INTRAMUSCULAR; INTRAVENOUS; SUBCUTANEOUS
Status: DISCONTINUED | OUTPATIENT
Start: 2017-06-12 | End: 2017-06-12 | Stop reason: HOSPADM

## 2017-06-12 RX ORDER — FLUMAZENIL 0.1 MG/ML
0.2 INJECTION INTRAVENOUS AS NEEDED
Status: DISCONTINUED | OUTPATIENT
Start: 2017-06-12 | End: 2017-06-12 | Stop reason: HOSPADM

## 2017-06-12 RX ORDER — SODIUM CHLORIDE, SODIUM LACTATE, POTASSIUM CHLORIDE, CALCIUM CHLORIDE 600; 310; 30; 20 MG/100ML; MG/100ML; MG/100ML; MG/100ML
100 INJECTION, SOLUTION INTRAVENOUS CONTINUOUS
Status: DISCONTINUED | OUTPATIENT
Start: 2017-06-12 | End: 2017-06-12 | Stop reason: HOSPADM

## 2017-06-12 RX ORDER — FENTANYL CITRATE 50 UG/ML
100 INJECTION, SOLUTION INTRAMUSCULAR; INTRAVENOUS
Status: DISCONTINUED | OUTPATIENT
Start: 2017-06-12 | End: 2017-06-12 | Stop reason: HOSPADM

## 2017-06-12 RX ORDER — PROMETHAZINE HYDROCHLORIDE 25 MG/ML
12.5 INJECTION, SOLUTION INTRAMUSCULAR; INTRAVENOUS EVERY 6 HOURS PRN
Status: DISCONTINUED | OUTPATIENT
Start: 2017-06-12 | End: 2017-06-13 | Stop reason: HOSPADM

## 2017-06-12 RX ORDER — LABETALOL HYDROCHLORIDE 5 MG/ML
5 INJECTION, SOLUTION INTRAVENOUS
Status: DISCONTINUED | OUTPATIENT
Start: 2017-06-12 | End: 2017-06-12 | Stop reason: HOSPADM

## 2017-06-12 RX ORDER — PANTOPRAZOLE SODIUM 40 MG/10ML
40 INJECTION, POWDER, LYOPHILIZED, FOR SOLUTION INTRAVENOUS ONCE
Status: COMPLETED | OUTPATIENT
Start: 2017-06-12 | End: 2017-06-12

## 2017-06-12 RX ORDER — LISINOPRIL 5 MG/1
5 TABLET ORAL DAILY
Status: DISCONTINUED | OUTPATIENT
Start: 2017-06-12 | End: 2017-06-13 | Stop reason: HOSPADM

## 2017-06-12 RX ORDER — HYDROMORPHONE HYDROCHLORIDE 1 MG/ML
0.5 INJECTION, SOLUTION INTRAMUSCULAR; INTRAVENOUS; SUBCUTANEOUS
Status: DISCONTINUED | OUTPATIENT
Start: 2017-06-12 | End: 2017-06-13 | Stop reason: HOSPADM

## 2017-06-12 RX ORDER — ONDANSETRON 4 MG/1
4 TABLET, FILM COATED ORAL EVERY 6 HOURS PRN
Status: DISCONTINUED | OUTPATIENT
Start: 2017-06-12 | End: 2017-06-13 | Stop reason: HOSPADM

## 2017-06-12 RX ORDER — GLYCOPYRROLATE 0.2 MG/ML
INJECTION INTRAMUSCULAR; INTRAVENOUS AS NEEDED
Status: DISCONTINUED | OUTPATIENT
Start: 2017-06-12 | End: 2017-06-12 | Stop reason: SURG

## 2017-06-12 RX ORDER — FENTANYL CITRATE 50 UG/ML
INJECTION, SOLUTION INTRAMUSCULAR; INTRAVENOUS AS NEEDED
Status: DISCONTINUED | OUTPATIENT
Start: 2017-06-12 | End: 2017-06-12 | Stop reason: SURG

## 2017-06-12 RX ORDER — PROMETHAZINE HYDROCHLORIDE 25 MG/ML
12.5 INJECTION, SOLUTION INTRAMUSCULAR; INTRAVENOUS EVERY 4 HOURS PRN
Status: DISCONTINUED | OUTPATIENT
Start: 2017-06-12 | End: 2017-06-13 | Stop reason: HOSPADM

## 2017-06-12 RX ORDER — EPHEDRINE SULFATE 50 MG/ML
5 INJECTION, SOLUTION INTRAVENOUS ONCE AS NEEDED
Status: DISCONTINUED | OUTPATIENT
Start: 2017-06-12 | End: 2017-06-12 | Stop reason: HOSPADM

## 2017-06-12 RX ORDER — ACETAMINOPHEN 10 MG/ML
INJECTION, SOLUTION INTRAVENOUS AS NEEDED
Status: DISCONTINUED | OUTPATIENT
Start: 2017-06-12 | End: 2017-06-12 | Stop reason: SURG

## 2017-06-12 RX ORDER — SODIUM CHLORIDE, SODIUM LACTATE, POTASSIUM CHLORIDE, CALCIUM CHLORIDE 600; 310; 30; 20 MG/100ML; MG/100ML; MG/100ML; MG/100ML
150 INJECTION, SOLUTION INTRAVENOUS CONTINUOUS
Status: DISCONTINUED | OUTPATIENT
Start: 2017-06-12 | End: 2017-06-13 | Stop reason: HOSPADM

## 2017-06-12 RX ORDER — ACETAMINOPHEN 160 MG/5ML
650 SOLUTION ORAL EVERY 4 HOURS PRN
Status: DISCONTINUED | OUTPATIENT
Start: 2017-06-12 | End: 2017-06-13 | Stop reason: HOSPADM

## 2017-06-12 RX ORDER — MIDAZOLAM HYDROCHLORIDE 1 MG/ML
1 INJECTION INTRAMUSCULAR; INTRAVENOUS
Status: DISCONTINUED | OUTPATIENT
Start: 2017-06-12 | End: 2017-06-12 | Stop reason: HOSPADM

## 2017-06-12 RX ORDER — NITROGLYCERIN 0.4 MG/1
0.4 TABLET SUBLINGUAL
Status: DISCONTINUED | OUTPATIENT
Start: 2017-06-12 | End: 2017-06-13 | Stop reason: HOSPADM

## 2017-06-12 RX ORDER — NALOXONE HCL 0.4 MG/ML
0.1 VIAL (ML) INJECTION
Status: DISCONTINUED | OUTPATIENT
Start: 2017-06-12 | End: 2017-06-13 | Stop reason: HOSPADM

## 2017-06-12 RX ORDER — OXYCODONE HYDROCHLORIDE AND ACETAMINOPHEN 5; 325 MG/1; MG/1
2 TABLET ORAL ONCE AS NEEDED
Status: DISCONTINUED | OUTPATIENT
Start: 2017-06-12 | End: 2017-06-12 | Stop reason: HOSPADM

## 2017-06-12 RX ORDER — ROCURONIUM BROMIDE 10 MG/ML
INJECTION, SOLUTION INTRAVENOUS AS NEEDED
Status: DISCONTINUED | OUTPATIENT
Start: 2017-06-12 | End: 2017-06-12 | Stop reason: SURG

## 2017-06-12 RX ORDER — ONDANSETRON 4 MG/1
4 TABLET, ORALLY DISINTEGRATING ORAL EVERY 6 HOURS PRN
Status: DISCONTINUED | OUTPATIENT
Start: 2017-06-12 | End: 2017-06-13 | Stop reason: HOSPADM

## 2017-06-12 RX ORDER — ONDANSETRON 2 MG/ML
INJECTION INTRAMUSCULAR; INTRAVENOUS AS NEEDED
Status: DISCONTINUED | OUTPATIENT
Start: 2017-06-12 | End: 2017-06-12 | Stop reason: SURG

## 2017-06-12 RX ORDER — LABETALOL HYDROCHLORIDE 5 MG/ML
10 INJECTION, SOLUTION INTRAVENOUS
Status: DISCONTINUED | OUTPATIENT
Start: 2017-06-12 | End: 2017-06-13 | Stop reason: HOSPADM

## 2017-06-12 RX ORDER — METOCLOPRAMIDE HYDROCHLORIDE 5 MG/ML
10 INJECTION INTRAMUSCULAR; INTRAVENOUS EVERY 6 HOURS
Status: DISCONTINUED | OUTPATIENT
Start: 2017-06-12 | End: 2017-06-13 | Stop reason: HOSPADM

## 2017-06-12 RX ORDER — HYDROMORPHONE HYDROCHLORIDE 2 MG/1
2 TABLET ORAL EVERY 4 HOURS PRN
Status: DISCONTINUED | OUTPATIENT
Start: 2017-06-12 | End: 2017-06-13 | Stop reason: HOSPADM

## 2017-06-12 RX ORDER — MAGNESIUM HYDROXIDE 1200 MG/15ML
LIQUID ORAL AS NEEDED
Status: DISCONTINUED | OUTPATIENT
Start: 2017-06-12 | End: 2017-06-12 | Stop reason: HOSPADM

## 2017-06-12 RX ORDER — METOCLOPRAMIDE HYDROCHLORIDE 5 MG/ML
INJECTION INTRAMUSCULAR; INTRAVENOUS
Status: COMPLETED
Start: 2017-06-12 | End: 2017-06-12

## 2017-06-12 RX ORDER — ALBUTEROL SULFATE 2.5 MG/3ML
2.5 SOLUTION RESPIRATORY (INHALATION)
Status: DISCONTINUED | OUTPATIENT
Start: 2017-06-12 | End: 2017-06-13 | Stop reason: HOSPADM

## 2017-06-12 RX ORDER — HYDRALAZINE HYDROCHLORIDE 20 MG/ML
5 INJECTION INTRAMUSCULAR; INTRAVENOUS
Status: DISCONTINUED | OUTPATIENT
Start: 2017-06-12 | End: 2017-06-12 | Stop reason: HOSPADM

## 2017-06-12 RX ORDER — FENTANYL CITRATE 50 UG/ML
50 INJECTION, SOLUTION INTRAMUSCULAR; INTRAVENOUS
Status: DISCONTINUED | OUTPATIENT
Start: 2017-06-12 | End: 2017-06-12 | Stop reason: HOSPADM

## 2017-06-12 RX ADMIN — HYDROCODONE BITARTRATE AND ACETAMINOPHEN 15 ML: 2.5; 108 SOLUTION ORAL at 19:55

## 2017-06-12 RX ADMIN — GLYCOPYRROLATE 0.6 MG: 0.2 INJECTION INTRAMUSCULAR; INTRAVENOUS at 12:22

## 2017-06-12 RX ADMIN — METOCLOPRAMIDE 10 MG: 5 INJECTION, SOLUTION INTRAMUSCULAR; INTRAVENOUS at 12:56

## 2017-06-12 RX ADMIN — HYDROMORPHONE HYDROCHLORIDE 0.5 MG: 1 INJECTION, SOLUTION INTRAMUSCULAR; INTRAVENOUS; SUBCUTANEOUS at 13:05

## 2017-06-12 RX ADMIN — ALBUTEROL SULFATE 2.5 MG: 2.5 SOLUTION RESPIRATORY (INHALATION) at 15:27

## 2017-06-12 RX ADMIN — ACETAMINOPHEN 1000 MG: 10 INJECTION, SOLUTION INTRAVENOUS at 11:44

## 2017-06-12 RX ADMIN — ONDANSETRON 4 MG: 2 INJECTION INTRAMUSCULAR; INTRAVENOUS at 20:02

## 2017-06-12 RX ADMIN — PROPOFOL 100 MG: 10 INJECTION, EMULSION INTRAVENOUS at 11:32

## 2017-06-12 RX ADMIN — PROPOFOL 200 MG: 10 INJECTION, EMULSION INTRAVENOUS at 11:30

## 2017-06-12 RX ADMIN — LIDOCAINE HYDROCHLORIDE 60 MG: 20 INJECTION, SOLUTION INFILTRATION; PERINEURAL at 11:30

## 2017-06-12 RX ADMIN — CHLORHEXIDINE GLUCONATE 15 ML: 1.2 RINSE ORAL at 09:39

## 2017-06-12 RX ADMIN — PANTOPRAZOLE SODIUM 40 MG: 40 INJECTION, POWDER, FOR SOLUTION INTRAVENOUS at 09:42

## 2017-06-12 RX ADMIN — ONDANSETRON 4 MG: 2 INJECTION INTRAMUSCULAR; INTRAVENOUS at 12:22

## 2017-06-12 RX ADMIN — ENOXAPARIN SODIUM 40 MG: 40 INJECTION SUBCUTANEOUS at 11:17

## 2017-06-12 RX ADMIN — CEFAZOLIN 3 G: 1 INJECTION, POWDER, FOR SOLUTION INTRAMUSCULAR; INTRAVENOUS; PARENTERAL at 11:24

## 2017-06-12 RX ADMIN — LISINOPRIL 5 MG: 5 TABLET ORAL at 19:58

## 2017-06-12 RX ADMIN — SCOPOLAMINE 1 PATCH: 1 PATCH, EXTENDED RELEASE TRANSDERMAL at 09:37

## 2017-06-12 RX ADMIN — CEFAZOLIN SODIUM 2 G: 2 INJECTION, SOLUTION INTRAVENOUS at 19:24

## 2017-06-12 RX ADMIN — HYDROCODONE BITARTRATE AND ACETAMINOPHEN 15 ML: 2.5; 108 SOLUTION ORAL at 14:57

## 2017-06-12 RX ADMIN — MIDAZOLAM 1 MG: 1 INJECTION INTRAMUSCULAR; INTRAVENOUS at 09:48

## 2017-06-12 RX ADMIN — CHLORHEXIDINE GLUCONATE 15 ML: 1.2 RINSE ORAL at 09:35

## 2017-06-12 RX ADMIN — FOLIC ACID 250 ML/HR: 5 INJECTION, SOLUTION INTRAMUSCULAR; INTRAVENOUS; SUBCUTANEOUS at 23:42

## 2017-06-12 RX ADMIN — HYOSCYAMINE SULFATE 125 MCG: 0.12 TABLET ORAL at 19:59

## 2017-06-12 RX ADMIN — SODIUM CHLORIDE, POTASSIUM CHLORIDE, SODIUM LACTATE AND CALCIUM CHLORIDE 150 ML/HR: 600; 310; 30; 20 INJECTION, SOLUTION INTRAVENOUS at 14:57

## 2017-06-12 RX ADMIN — MIDAZOLAM 1 MG: 1 INJECTION INTRAMUSCULAR; INTRAVENOUS at 11:16

## 2017-06-12 RX ADMIN — ALBUTEROL SULFATE 2.5 MG: 2.5 SOLUTION RESPIRATORY (INHALATION) at 22:18

## 2017-06-12 RX ADMIN — HYDROMORPHONE HYDROCHLORIDE 0.5 MG: 1 INJECTION, SOLUTION INTRAMUSCULAR; INTRAVENOUS; SUBCUTANEOUS at 12:51

## 2017-06-12 RX ADMIN — FENTANYL CITRATE 50 MCG: 50 INJECTION INTRAMUSCULAR; INTRAVENOUS at 12:34

## 2017-06-12 RX ADMIN — FENTANYL CITRATE 50 MCG: 50 INJECTION INTRAMUSCULAR; INTRAVENOUS at 11:58

## 2017-06-12 RX ADMIN — SODIUM CHLORIDE, POTASSIUM CHLORIDE, SODIUM LACTATE AND CALCIUM CHLORIDE 100 ML/HR: 600; 310; 30; 20 INJECTION, SOLUTION INTRAVENOUS at 10:30

## 2017-06-12 RX ADMIN — NEOSTIGMINE METHYLSULFATE 4 MG: 1 INJECTION INTRAMUSCULAR; INTRAVENOUS; SUBCUTANEOUS at 12:22

## 2017-06-12 RX ADMIN — SODIUM CHLORIDE, POTASSIUM CHLORIDE, SODIUM LACTATE AND CALCIUM CHLORIDE 500 ML: 600; 310; 30; 20 INJECTION, SOLUTION INTRAVENOUS at 09:23

## 2017-06-12 RX ADMIN — ROCURONIUM BROMIDE 30 MG: 10 INJECTION INTRAVENOUS at 11:30

## 2017-06-12 RX ADMIN — HYOSCYAMINE SULFATE 125 MCG: 0.12 TABLET ORAL at 17:14

## 2017-06-12 RX ADMIN — FAMOTIDINE 20 MG: 10 INJECTION, SOLUTION INTRAVENOUS at 09:40

## 2017-06-12 RX ADMIN — METOCLOPRAMIDE 10 MG: 5 INJECTION, SOLUTION INTRAMUSCULAR; INTRAVENOUS at 19:24

## 2017-06-12 RX ADMIN — FENTANYL CITRATE 50 MCG: 50 INJECTION INTRAMUSCULAR; INTRAVENOUS at 11:30

## 2017-06-12 RX ADMIN — SODIUM CHLORIDE, POTASSIUM CHLORIDE, SODIUM LACTATE AND CALCIUM CHLORIDE: 600; 310; 30; 20 INJECTION, SOLUTION INTRAVENOUS at 12:20

## 2017-06-12 RX ADMIN — SODIUM CHLORIDE, POTASSIUM CHLORIDE, SODIUM LACTATE AND CALCIUM CHLORIDE 150 ML/HR: 600; 310; 30; 20 INJECTION, SOLUTION INTRAVENOUS at 17:14

## 2017-06-12 NOTE — OP NOTE
PREOPERATIVE DIAGNOSIS:  Morbid obesity with multiple comorbidities as referenced in the most recent history and physical.    POSTOPERATIVE DIAGNOSIS:    1:  Morbid obesity with multiple comorbidities as referenced in the most recent history and physical.  2:  Hiatal Hernia  3.  Adhesions extensive    PROCEDURES PERFORMED:  1.  Laparoscopic sleeve gastrectomy.  2.  Laparoscopic hiatal hernia repair.  3.  Laparoscopic lysis of adhesions  2.  Tisseel application.     SURGEON:  Job Douglass Jr., MD    ASSISTANT:  NOÉ Alberts, [facility]    Surgery assisted and facilitated by a certified physician assistant, who directly resulted in a decreased operative time, anesthetic time, wound exposure, and possibly of an operative wound infection, thereby decreasing patient morbidity and ultimately total expenditures.    ANESTHESIA:  General endotracheal.    ESTIMATED BLOOD LOSS:   Less than 25 mL unless dictated below.    FLUIDS:  Crystalloids.    SPECIMENS:  None.    DRAINS:  None.    COUNTS:  Correct.    COMPLICATIONS:  None.    INDICATIONS:  This patient with morbid obesity and associated comorbidities presents for elective laparoscopic, possible open sleeve gastrectomy.  The patient has received medical clearance to proceed.  The patient has undergone our extensive educational process and consent process and wishes to proceed.    DESCRIPTION OF PROCEDURE:  The patient was brought to the operating room and placed supine upon the operating room table. SCD hose were placed.  The patient underwent uneventful general endotracheal anesthesia per the anesthesiology staff. The abdomen was prepped with ChloraPrep and draped in the usual sterile fashion.  An Ioban was used as well if not allergic. Anesthesia staff then passed a 36-Malian ViSiGi bougie into the stomach to decompress and then was pulled back to the mouth.    A 5-10 mm transverse incision was made a few centimeters above and to the left of the umbilicus and the  peritoneal cavity entered under direct camera visualization using a 5 or 10 mm 0° laparoscope and an Optiview trocar.  The abdomen was then insufflated to a pressure of 16 mmHg with CO2 gas.  Exploratory laparoscopy revealed no evidence of injury from the entrance technique and no significant abnormalities unless addendum dictated below.  An angled laparoscope was then used.  The patient was placed in reverse Trendelenburg position.  Under direct camera visualization, a 5 mm trocar was placed in the right lateral subcostal position.  A 12 mm trocar was placed in the right midabdominal position.  A 5 mm trocar was placed in the left midabdominal position. A Gwendolyn retractor was placed through an epigastric incision and used to elevate the left lobe of the liver.  The fat pad was elevated and the left ryan exposed.  At this point, approximately correction along the greater curvature, the gastrocolic omentum was divided with the Enseal and this proceeded superiorly to the angle of His taking down the short gastric vessels.  All posterior attachments of the lesser sac and posterior aspect of the stomach to the pancreas were taken down as well.  The left ryan was exposed along its length.  Dissection then proceeded medially taking down the greater curvature with an Enseal until just proximal to the pylorus.  The 36-French ViSiGi bougie was passed back down into the stomach by anesthesia and the sleeve gastrectomy was then performed.  The 1st load was a black, thick tissue load on the Wister Flex stapler with Veritas Yusra-Strip and this was placed 3-4 cm proximal to the pylorus and up against the bougie pulling it anteriorly and posteriorly up against the bougie making sure not to narrow the incisura.  The next 5-6 loads were green with dual absorbable Veritas Yusra-Strips. Careful attention was made to stay about 1 cm from the esophagus.  Areas of the reinforced staple line were oversewn with absorbable sutures as needed  for bleeding or questionable staple lines.   At this point, the sleeve was submerged under saline and using the ViSiGi bougie to insufflate the stomach, a leak test was performed.  This revealed the sleeve to be watertight, no air bubbles, no leak, and no bleeding seen from the staple lines and no significant abnormalities.  Irrigation fluid from the abdomen was then suctioned free.  The gastric sleeve staple line was then treated with 4 mL Tisseel fibrin glue. The fascia at the 12 mm trocar site incision was closed with a single 0 Vicryl suture in a figure-of-eight fashion placed under direct laparoscopic camera visualization with a suture passer and tying the knot extracorporeally.  The fascia in the area was infiltrated with local anesthesia. All incisions were then infiltrated with local anesthetic. The remaining trocars were removed under direct camera visualization with no bleeding noted from their sites.  The abdomen was desufflated of gas. The skin in each incision was closed using 4-0 antibiotic impregnated Monocryl in a subcuticular stitch followed by Dermabond.  The patient tolerated the procedure well without complication and was taken to the recovery room in stable condition.  All sponge, needle, and instrument counts were correct.    The patient was noted to have a hiatal hernia.  The phrenoesophageal membrane was opened up with electrocautery and the right and left crura were cleaned off using sharp and blunt dissection.  The hernia sac was completely dissected free.  The intra-abdominal esophagus was now approximately 3 cm in length.  The base of the left ryan was exposed to evaluate for a posterior defect and lipomas.The short gastric vessels were taken down using the Enseal device and the fundus was removed as dictated above.  The hiatus was then reapproximated with 0 ethibond sutures in a figure-of-eight fashion.      Adhesions taken down laparoscopically mid and ruq.

## 2017-06-12 NOTE — ANESTHESIA PREPROCEDURE EVALUATION
Anesthesia Evaluation     Patient summary reviewed and Nursing notes reviewed   NPO Solid Status: > 8 hours       Airway   Mallampati: III  TM distance: <3 FB  Neck ROM: limited  possible difficult intubation  Dental - normal exam     Pulmonary - normal exam   (+) sleep apnea,   Cardiovascular - normal exam    (+) hypertension,       Neuro/Psych- negative ROS  GI/Hepatic/Renal/Endo    (+) morbid obesity, diabetes mellitus,     Musculoskeletal (-) negative ROS    Abdominal  - normal exam   Substance History - negative use     OB/GYN negative ob/gyn ROS         Other                                        Anesthesia Plan    ASA 3     general     intravenous induction   Anesthetic plan and risks discussed with patient.    Plan discussed with CRNA.

## 2017-06-12 NOTE — PLAN OF CARE
Problem: Patient Care Overview (Adult)  Goal: Plan of Care Review  Outcome: Ongoing (interventions implemented as appropriate)    06/12/17 1038   Coping/Psychosocial Response Interventions   Plan Of Care Reviewed With patient   Patient Care Overview   Progress improving   Outcome Evaluation   Outcome Summary/Follow up Plan Vital signs stable. Pain controlled with prn hycet. No complaints of nausea. Tolerating sips of G2 well. Ambulating well. Continue to monitor.        Goal: Adult Individualization and Mutuality  Outcome: Ongoing (interventions implemented as appropriate)  Goal: Discharge Needs Assessment  Outcome: Ongoing (interventions implemented as appropriate)    Problem: Perioperative Period (Adult)  Goal: Signs and Symptoms of Listed Potential Problems Will be Absent or Manageable (Perioperative Period)  Outcome: Ongoing (interventions implemented as appropriate)

## 2017-06-12 NOTE — PLAN OF CARE
Problem: Patient Care Overview (Adult)  Goal: Discharge Needs Assessment  Outcome: Ongoing (interventions implemented as appropriate)    06/12/17 4467   Discharge Needs Assessment   Concerns To Be Addressed no discharge needs identified   Readmission Within The Last 30 Days no previous admission in last 30 days   Equipment Needed After Discharge none   Living Environment   Transportation Available car

## 2017-06-12 NOTE — PLAN OF CARE
Problem: Patient Care Overview (Adult)  Goal: Adult Individualization and Mutuality  Outcome: Ongoing (interventions implemented as appropriate)    06/12/17 1340   Individualization   Patient Specific Goals EARLY AMBULATION/PAIN CONTROL

## 2017-06-12 NOTE — PLAN OF CARE
Problem: Perioperative Period (Adult)  Goal: Signs and Symptoms of Listed Potential Problems Will be Absent or Manageable (Perioperative Period)  Outcome: Ongoing (interventions implemented as appropriate)    06/12/17 1341   Perioperative Period   Problems Assessed (Perioperative Period) all   Problems Present (Perioperative Period) pain

## 2017-06-12 NOTE — ANESTHESIA PROCEDURE NOTES
Airway  Urgency: elective    Date/Time: 6/12/2017 11:35 AM  Airway not difficult    General Information and Staff    Patient location during procedure: OR  Anesthesiologist: OZ CHE  CRNA: MARVIN MAYER    Indications and Patient Condition  Indications for airway management: airway protection    Preoxygenated: yes  Mask difficulty assessment: 1 - vent by mask    Final Airway Details  Final airway type: endotracheal airway      Successful airway: ETT  Cuffed: yes   Successful intubation technique: direct laryngoscopy  Facilitating devices/methods: intubating stylet  Endotracheal tube insertion site: oral  Blade: Sneha  Blade size: #3  ETT size: 7.0 mm  Cormack-Lehane Classification: grade I - full view of glottis  Placement verified by: chest auscultation and capnometry   Measured from: lips  ETT to lips (cm): 21  Number of attempts at approach: 2    Additional Comments  Pre 02, sivi,, easy bvm, dlx1, grade 2a view, intubation with eschman, +etc02, +bebs, appears atraumatic, teeth unchanged

## 2017-06-12 NOTE — ANESTHESIA POSTPROCEDURE EVALUATION
Patient: Angelina Narayan    Procedure Summary     Date Anesthesia Start Anesthesia Stop Room / Location    06/12/17 1124 1236  [facility]  /  [facility]       Procedure Diagnosis Surgeon Provider    GASTRIC SLEEVE LAPAROSCOPIC WITH HIATAL HERNIA REPIAR, LYSIS OF ADHESIONS (N/A Abdomen) Hiatal hernia; Obesity, Class III, BMI 40-49.9 (morbid obesity)  (Hiatal hernia [K44.9]; Obesity, Class III, BMI 40-49.9 (morbid obesity) [E66.01]) MD Job Zepeda Jr., MD          Anesthesia Type: general  Last vitals  /60 (06/12/17 1300)    Temp 36.5 °C (97.7 °F) (06/12/17 1234)    Pulse 72 (06/12/17 1300)   Resp 16 (06/12/17 1300)    SpO2 94 % (06/12/17 1300)      Post Anesthesia Care and Evaluation    Patient location during evaluation: bedside  Patient participation: complete - patient participated  Level of consciousness: awake  Pain score: 1  Pain management: adequate  Airway patency: patent  Anesthetic complications: No anesthetic complications    Cardiovascular status: acceptable  Respiratory status: acceptable  Hydration status: acceptable

## 2017-06-13 VITALS
HEIGHT: 65 IN | HEART RATE: 51 BPM | DIASTOLIC BLOOD PRESSURE: 64 MMHG | OXYGEN SATURATION: 95 % | WEIGHT: 269 LBS | RESPIRATION RATE: 16 BRPM | BODY MASS INDEX: 44.82 KG/M2 | SYSTOLIC BLOOD PRESSURE: 136 MMHG | TEMPERATURE: 97.9 F

## 2017-06-13 LAB
ALBUMIN SERPL-MCNC: 3.6 G/DL (ref 3.5–5.2)
ALBUMIN/GLOB SERPL: 1.3 G/DL
ALP SERPL-CCNC: 57 U/L (ref 39–117)
ALT SERPL W P-5'-P-CCNC: 23 U/L (ref 1–33)
ANION GAP SERPL CALCULATED.3IONS-SCNC: 10 MMOL/L
AST SERPL-CCNC: 25 U/L (ref 1–32)
BASOPHILS # BLD AUTO: 0.01 10*3/MM3 (ref 0–0.2)
BASOPHILS NFR BLD AUTO: 0.1 % (ref 0–1.5)
BILIRUB SERPL-MCNC: 0.2 MG/DL (ref 0.1–1.2)
BUN BLD-MCNC: 6 MG/DL (ref 6–20)
BUN/CREAT SERPL: 9.4 (ref 7–25)
CALCIUM SPEC-SCNC: 8 MG/DL (ref 8.6–10.5)
CHLORIDE SERPL-SCNC: 100 MMOL/L (ref 98–107)
CO2 SERPL-SCNC: 25 MMOL/L (ref 22–29)
CREAT BLD-MCNC: 0.64 MG/DL (ref 0.57–1)
DEPRECATED RDW RBC AUTO: 46.5 FL (ref 37–54)
EOSINOPHIL # BLD AUTO: 0.01 10*3/MM3 (ref 0–0.7)
EOSINOPHIL NFR BLD AUTO: 0.1 % (ref 0.3–6.2)
ERYTHROCYTE [DISTWIDTH] IN BLOOD BY AUTOMATED COUNT: 13.8 % (ref 11.7–13)
GFR SERPL CREATININE-BSD FRML MDRD: 95 ML/MIN/1.73
GLOBULIN UR ELPH-MCNC: 2.7 GM/DL
GLUCOSE BLD-MCNC: 138 MG/DL (ref 65–99)
GLUCOSE BLDC GLUCOMTR-MCNC: 110 MG/DL (ref 70–130)
GLUCOSE BLDC GLUCOMTR-MCNC: 99 MG/DL (ref 70–130)
HCT VFR BLD AUTO: 37.8 % (ref 35.6–45.5)
HGB BLD-MCNC: 11.9 G/DL (ref 11.9–15.5)
IMM GRANULOCYTES # BLD: 0.03 10*3/MM3 (ref 0–0.03)
IMM GRANULOCYTES NFR BLD: 0.3 % (ref 0–0.5)
LYMPHOCYTES # BLD AUTO: 1.04 10*3/MM3 (ref 0.9–4.8)
LYMPHOCYTES NFR BLD AUTO: 10 % (ref 19.6–45.3)
MAGNESIUM SERPL-MCNC: 2.3 MG/DL (ref 1.6–2.6)
MCH RBC QN AUTO: 29.2 PG (ref 26.9–32)
MCHC RBC AUTO-ENTMCNC: 31.5 G/DL (ref 32.4–36.3)
MCV RBC AUTO: 92.6 FL (ref 80.5–98.2)
MONOCYTES # BLD AUTO: 0.59 10*3/MM3 (ref 0.2–1.2)
MONOCYTES NFR BLD AUTO: 5.7 % (ref 5–12)
NEUTROPHILS # BLD AUTO: 8.75 10*3/MM3 (ref 1.9–8.1)
NEUTROPHILS NFR BLD AUTO: 83.8 % (ref 42.7–76)
PHOSPHATE SERPL-MCNC: 2.9 MG/DL (ref 2.5–4.5)
PLATELET # BLD AUTO: 203 10*3/MM3 (ref 140–500)
PMV BLD AUTO: 11.6 FL (ref 6–12)
POTASSIUM BLD-SCNC: 4 MMOL/L (ref 3.5–5.2)
PROT SERPL-MCNC: 6.3 G/DL (ref 6–8.5)
RBC # BLD AUTO: 4.08 10*6/MM3 (ref 3.9–5.2)
SODIUM BLD-SCNC: 135 MMOL/L (ref 136–145)
WBC NRBC COR # BLD: 10.43 10*3/MM3 (ref 4.5–10.7)

## 2017-06-13 PROCEDURE — 25010000002 CYANOCOBALAMIN PER 1000 MCG: Performed by: SURGERY

## 2017-06-13 PROCEDURE — 84100 ASSAY OF PHOSPHORUS: CPT | Performed by: SURGERY

## 2017-06-13 PROCEDURE — 94799 UNLISTED PULMONARY SVC/PX: CPT

## 2017-06-13 PROCEDURE — 94760 N-INVAS EAR/PLS OXIMETRY 1: CPT

## 2017-06-13 PROCEDURE — 25010000002 PYRIDOXINE PER 100 MG: Performed by: SURGERY

## 2017-06-13 PROCEDURE — 25010000002 ONDANSETRON PER 1 MG: Performed by: SURGERY

## 2017-06-13 PROCEDURE — 25010000002 ENOXAPARIN PER 10 MG: Performed by: SURGERY

## 2017-06-13 PROCEDURE — 83735 ASSAY OF MAGNESIUM: CPT | Performed by: SURGERY

## 2017-06-13 PROCEDURE — 25010000003 CEFAZOLIN IN DEXTROSE 2-4 GM/100ML-% SOLUTION: Performed by: SURGERY

## 2017-06-13 PROCEDURE — 25010000002 THIAMINE PER 100 MG: Performed by: SURGERY

## 2017-06-13 PROCEDURE — 80053 COMPREHEN METABOLIC PANEL: CPT | Performed by: SURGERY

## 2017-06-13 PROCEDURE — 85025 COMPLETE CBC W/AUTO DIFF WBC: CPT | Performed by: SURGERY

## 2017-06-13 PROCEDURE — 25010000002 METOCLOPRAMIDE PER 10 MG: Performed by: SURGERY

## 2017-06-13 PROCEDURE — 82962 GLUCOSE BLOOD TEST: CPT

## 2017-06-13 RX ADMIN — HYDROCODONE BITARTRATE AND ACETAMINOPHEN 15 ML: 2.5; 108 SOLUTION ORAL at 11:00

## 2017-06-13 RX ADMIN — HYDROCODONE BITARTRATE AND ACETAMINOPHEN 15 ML: 2.5; 108 SOLUTION ORAL at 01:24

## 2017-06-13 RX ADMIN — LEVOTHYROXINE SODIUM 100 MCG: 100 TABLET ORAL at 06:47

## 2017-06-13 RX ADMIN — METOCLOPRAMIDE 10 MG: 5 INJECTION, SOLUTION INTRAMUSCULAR; INTRAVENOUS at 06:47

## 2017-06-13 RX ADMIN — PANTOPRAZOLE SODIUM 40 MG: 40 INJECTION, POWDER, FOR SOLUTION INTRAVENOUS at 05:47

## 2017-06-13 RX ADMIN — SODIUM CHLORIDE, POTASSIUM CHLORIDE, SODIUM LACTATE AND CALCIUM CHLORIDE 150 ML/HR: 600; 310; 30; 20 INJECTION, SOLUTION INTRAVENOUS at 06:50

## 2017-06-13 RX ADMIN — HYOSCYAMINE SULFATE 125 MCG: 0.12 TABLET ORAL at 08:36

## 2017-06-13 RX ADMIN — ENOXAPARIN SODIUM 40 MG: 40 INJECTION SUBCUTANEOUS at 08:35

## 2017-06-13 RX ADMIN — ONDANSETRON 4 MG: 2 INJECTION INTRAMUSCULAR; INTRAVENOUS at 05:47

## 2017-06-13 RX ADMIN — ALBUTEROL SULFATE 2.5 MG: 2.5 SOLUTION RESPIRATORY (INHALATION) at 08:53

## 2017-06-13 RX ADMIN — HYDROCODONE BITARTRATE AND ACETAMINOPHEN 15 ML: 2.5; 108 SOLUTION ORAL at 06:50

## 2017-06-13 RX ADMIN — METOCLOPRAMIDE 10 MG: 5 INJECTION, SOLUTION INTRAMUSCULAR; INTRAVENOUS at 01:06

## 2017-06-13 RX ADMIN — CYANOCOBALAMIN 1000 MCG: 1000 INJECTION, SOLUTION INTRAMUSCULAR at 08:35

## 2017-06-13 RX ADMIN — CEFAZOLIN SODIUM 2 G: 2 INJECTION, SOLUTION INTRAVENOUS at 04:06

## 2017-06-13 NOTE — PROGRESS NOTES
"Subjective:       Angelina Narayan  is post op day one status post procedure listed. Patient denies shortness of air and lower extremity pain. Feels better than yesterday. No nausea or vomiting this am. Ambulating well and using incentive spirometer.       Objective:        /64 (BP Location: Right arm, Patient Position: Sitting)  Pulse 62  Temp 97.9 °F (36.6 °C) (Oral)   Resp 18  Ht 65\" (165.1 cm)  Wt 269 lb (122 kg)  SpO2 94%  BMI 44.76 kg/m2    General:  alert, appears stated age and cooperative   Abdomen: soft, bowel sounds active, appropriate tenderness   Incision:   healing well, no drainage, no erythema, no hernia, no seroma, no swelling, no dehiscence, incision well approximated   Heart: Regular rate   Lungs: Clear to auscultation bilaterally     I reviewed the patient's new clinical results.     Lab Results (last 24 hours)     Procedure Component Value Units Date/Time    POC Glucose Fingerstick [[id number]]  (Normal) Collected:  06/12/17 0915    Specimen:  Blood Updated:  06/12/17 0928     Glucose 93 mg/dL     Narrative:       Meter: [id number] : [id number] Marcus Sutton    POC Glucose Fingerstick [[id number]]  (Normal) Collected:  06/12/17 1608    Specimen:  Blood Updated:  06/12/17 1609     Glucose 119 mg/dL     Narrative:       Meter: [id number] : [id number] Viv Rodriguez    POC Glucose Fingerstick [[id number]]  (Normal) Collected:  06/12/17 2038    Specimen:  Blood Updated:  06/12/17 2039     Glucose 122 mg/dL     Narrative:       Meter: [id number] : [id number] Gabe Ritter    CBC & Differential [[id number]] Collected:  06/13/17 0435    Specimen:  Blood Updated:  06/13/17 0500    Narrative:       The following orders were created for panel order CBC & Differential.  Procedure                               Abnormality         Status                     ---------                               -----------         ------                     CBC Auto Differential[[id number]]        Abnormal     "        Final result                 Please view results for these tests on the individual orders.    CBC Auto Differential [[id number]]  (Abnormal) Collected:  06/13/17 0435    Specimen:  Blood Updated:  06/13/17 0529     WBC 10.43 10*3/mm3      RBC 4.08 10*6/mm3      Hemoglobin 11.9 g/dL      Hematocrit 37.8 %      MCV 92.6 fL      MCH 29.2 pg      MCHC 31.5 (L) g/dL      RDW 13.8 (H) %      RDW-SD 46.5 fl      MPV 11.6 fL      Platelets 203 10*3/mm3      Neutrophil % 83.8 (H) %      Lymphocyte % 10.0 (L) %      Monocyte % 5.7 %      Eosinophil % 0.1 (L) %      Basophil % 0.1 %      Immature Grans % 0.3 %      Neutrophils, Absolute 8.75 (H) 10*3/mm3      Lymphocytes, Absolute 1.04 10*3/mm3      Monocytes, Absolute 0.59 10*3/mm3      Eosinophils, Absolute 0.01 10*3/mm3      Basophils, Absolute 0.01 10*3/mm3      Immature Grans, Absolute 0.03 10*3/mm3     Phosphorus [[id number]]  (Normal) Collected:  06/13/17 0435    Specimen:  Blood Updated:  06/13/17 0547     Phosphorus 2.9 mg/dL     Magnesium [[id number]]  (Normal) Collected:  06/13/17 0435    Specimen:  Blood Updated:  06/13/17 0547     Magnesium 2.3 mg/dL     Comprehensive Metabolic Panel [[id number]]  (Abnormal) Collected:  06/13/17 0435    Specimen:  Blood Updated:  06/13/17 0603     Glucose 138 (H) mg/dL      BUN 6 mg/dL      Creatinine 0.64 mg/dL      Sodium 135 (L) mmol/L      Potassium 4.0 mmol/L      Chloride 100 mmol/L      CO2 25.0 mmol/L      Calcium 8.0 (L) mg/dL      Total Protein 6.3 g/dL      Albumin 3.60 g/dL      ALT (SGPT) 23 U/L      AST (SGOT) 25 U/L      Alkaline Phosphatase 57 U/L      Total Bilirubin 0.2 mg/dL      eGFR Non African Amer 95 mL/min/1.73      Globulin 2.7 gm/dL      A/G Ratio 1.3 g/dL      BUN/Creatinine Ratio 9.4     Anion Gap 10.0 mmol/L     POC Glucose Fingerstick [[id number]]  (Normal) Collected:  06/13/17 0628    Specimen:  Blood Updated:  06/13/17 0630     Glucose 110 mg/dL     Narrative:       Meter: [id number] :  [id number] [facility]             Assessment:      Doing well postoperatively.      Plan:   1. Start diet  2. Continue with ambulation and Incentive spirometry  3. Plan for d/c home later today if tolerating diet  4. Lovenox cont    Patient was seen and examined by Dr. Douglass.

## 2017-06-13 NOTE — DISCHARGE INSTRUCTIONS
GOING HOME AFTER GASTRIC SLEEVE/ GASTRIC BYPASS SURGERY  [facility] Weight Loss: Post-Operative Information/Instructions  Job Douglass Jr., MD  General Patient Instructions for Discharge   - Call Surgeon's office at [phone] for follow-up appointment.    - Be sure you, the patient, have a follow-up appointment to be seen within three (3) days after discharge. If not, please call [phone] to schedule an appointment. If you are discharged on a Saturday or Sunday, please call Monday to schedule the appointment.  - Contact the Surgeon at [phone] for any questions or concerns, including temperature greater than or equal to 101F, shortness of breath, leg swelling, redness at incision sites, nausea, vomiting, chills, or problems or questions.    - Follow the Gastric Stage 1 Diet    à Clear liquids, room temperature, sugar-free, caffeine-free, non-carbonated, 70 grams of protein, No Straws.  - You may shower. No tub bath for 2 weeks.  - No lifting, pushing, pulling, or tugging >25 pounds for 3 weeks.  - Ambulate 4 x per day minimum, increase distance daily.  - For the next several weeks, you are at an increased risk for blood clot formation. Therefore, you should walk regularly. You should not sit for prolonged periods of time, more than 45 minutes, without getting up and walking for 5-10 minutes. This includes any car rides, including the drive home from the hospital. If driving any distance greater than 30 miles over the next two (2) weeks, stop every 30-45 minutes and walk for 5-10 minutes each time.  - Continue using Incentive Spirometer and coughing exercises at least every two (2) hours while awake for one week.  - If you, the patient, use CPAP/BIPAP for diagnosis of sleep apnea, you may resume use tonight at home.  - No driving or operating machinery allowed while taking narcotic (prescription) pain medication, and until you feel comfortable forcefully applying the brakes if needed. (This  usually takes more than 3 days.)    - Make an appointment with your Primary Care Physician within one week post-op to look at your home medications for possible changes or discontinuity.   Medications  - The nurse will provide a list of medications for you to continue at home   - If you received a Lovenox (Enoxaparin) or Apixiban (Eliquis) prescription at pre-op visit with Surgeon, start taking the medicine the morning after discharge unless directed otherwise.    - If you were prescribed Lovenox (Enoxaparin), review the education/teaching material/video with the nurse.    - Take post op pain meds as prescribed as needed.   - Continue Foltx until finished.   - Start Actigall (Ursodiol) one (1) week after surgery if patient still has gallbladder. You should have been given a prescription at your pre-op visit. Contact the office if you do not have the prescription.   - Start bariatric vitamin regimen as instructed in pre-op education with bariatric coordinator.    - Zegerid or Prilosec OTC (or generic) by mouth once daily for four (4) weeks unless you are already taking a proton pump inhibitor as home medication. Follow dosing instructions on package.   Nausea/Vomiting:  The following are possible causes for nausea/vomiting:  - Drinking too much or too fast.  - Sinus drainage/post nasal drip for allergy sufferers (you may take Sudafed, Claritin, Tylenol Sinus/Allergy, or other decongestants and nose sprays to help with this discomfort).  - Low blood sugar (sweating, shaky, irritable, weakness, dizzy or tunnel-vision) - treatment is to sip 100% fruit juice - no sugar added until symptoms subside.  - Acid in fruit juice - (may dilute with water or avoid).  - Eating or drinking something that is not on clear liquid (stage 1) diet.  Any nausea/vomiting that prohibits you from keeping fluids down for greater than 24 hours requires a call to the surgeon's office.  Urine:  Use your urine color as a guide to determine if you  are drinking enough fluid. The darker the urine, the more fluids you need to drink. Urine should be clear to light yellow if you are getting enough fluid. If you should experience frequency, burning or pain with urination, blood in urine, contact us or your primary care physician for possible UTI (urinary tract infection), which could require antibiotics (liquid preferred).  Bowel Movements:  You may not have a bowel movement for 2-5 days after going home. You may then experience liquid, runny or loose stools for approximately 3-4 weeks following surgery. This would require you to drink even more fluids to prevent dehydration. Some patients may experience constipation, which can be treated with increased fluids, drinking warm liquids, increased activity and the use of a Fleets Enema, Milk of Magnesia, or suppositories. The first couple of bowel movements could be bloody, tarry black or dark maroon in color. This is OK as long as the stool returns to a normal color in 1-2 days. If however, you have frequent or a large amount of bloody or tarry black stools and/or become light-headed or dizzy, you may be bleeding and require urgent attention. Please call us right away.  Abdominal Incisions:  You will have small incisions. Do not scrub incisions, but allow the warm, soapy water to run over the incisions, rinse well, and pat dry. You may use any brand of anti-bacterial soap. Do not use Peroxide or Neosporin type ointments on sites, unless instructed to do so by a surgeon or nurse. Monitor daily for signs/symptoms of infection, which might include: drainage with a foul odor, pain, redness, swelling or heat at the incision sight; fever, body aches and chills. If you suspect infection or have a fever, give us a call.  Pain:  You will be given a prescription for pain medication to control your pain. If you feel the dose is too strong, you may take half the ordered dose, or you may take Tylenol adult liquid per package  instructions for minor pain. Do not take any medications that contain aspirin or aspirin products.  Do not take medications like: Motrin, Aleve, Ibuprofen, Advil, Naproxen, Celebrex, Daypro, Bextra, Meloxicam or other medications commonly used for arthritis or joint pain.  No steroids or cortisone injections. There may be pain, which should improve every few days. Pain should not suddenly get worse or more intense. Pain that suddenly changes and is constant and severe should be called in to the surgeon's office. Any sudden pain in the lower extremities with associated warmth and redness should be called in to the surgeon's office immediately. Do not rub or massage this area, as it could be a blood clot.  Diet:  Remain on the clear liquid diet (stage 1) per your  which includes 70 grams of protein each day, sugar free, non carbonated and no straws. Day 1 is the day of surgery. If you are tolerating the stage 1 diet, you may then proceed to stage 2 diet, as instructed in the . Do not progress to the stage 2 diet if you are having nausea/vomiting. Refer to the Basic Nutrition and Food Principles guide.  Medications:  The nurse will let you know which medications you will need to continue once you go home. Do not take any medications that are extended or time released if you had the gastric bypass procedure, OK to take if you had the gastric sleeve procedure. Large capsules can be opened and diluted with clear liquids. Check with your physician or pharmacist as to which pills may be crushed and which capsules may be opened and diluted safely. Continue taking Foltx as surgeon orders. If you still have your gall bladder and were prescribed Actigall (Ursodiol), you may start this medication one week after your surgery. You will remain on Actigall (Ursodiol) for approximately 6 months. The dose is 1 pill, 2 times each day for 6 months.  Activity:  Continue your deep breathing and coughing  exercises with your Incentive Spirometer breathing device at least every 2 hours while awake (10 repetitions each time) for one week. May use CPAP. This will help to prevent respiratory problems such as pneumonia. No lifting, pulling or tugging anything over 25 pounds for 3 weeks after surgery. You may shower but no tub baths, hot tubs or swimming for 2 weeks. Moderate walking is recommended every 2 hours and at least 4 times per day minimum, increase distance daily. Further exercise will be discussed at the first post-op visit. No driving or operating machinery allow until off narcotic pain medication and until you feel comfortable forcefully applying the brakes (usually takes 3 or more days). For the next few weeks you are at an increased risk for blood clot formation. Therefore you should walk regularly and you should not sit for prolonged periods of time, more than 45 minutes without getting up and walking for 5-10 minutes. This includes car rides. Including riding home from the hospital. If riding a distance greater than 30 miles over the next 2 weeks stop every 30-45 minutes and walk 5-10 mintues each time. No tanning bed use for 8 weeks after surgery and in general, not recommended due to the increased risk for skin cancer. Incisions will burn/blister very badly with tanning bed use.  Illness:  Your primary care physician should treat general illness such as ear infections, sinus infections, and viral type illnesses, etc. Medications prescribed should be liquid/elixir form when possible, for the first 30 days.  General:  In general, it is recommended that you weigh yourself no more than once per week. Let the weight come off you and concentrate on more important things. Remember the weight was not gained overnight, nor will it be lost overnight. Gastric Bypass/ Gastric Sleeve weight loss will continue over a period of 12-18 months. Do not  yourself according to how others are doing after surgery, as this  will cause unnecessary discouragement.  THE ABOVE ARE GENERAL GUIDELINES TO ASSIST YOU ONCE HOME, IF YOU ARE IN DOUBT, OR YOU HAVE ANY QUESTIONS, CALL US AT THE NUMBERS LISTED BELOW.  IN THE EVENT OF SUDDEN CHEST PAIN, SHORTNESS OF BREATH, OR ANY LIFE THREATENING CONDITION, CALL 911.  Any time you are evaluated or admitted to another facility, please have someone notify the surgeon's office.  Supplements:  70 grams of protein taken EVERY DAY. Remember to drink at least 64 ounces of fluid a day, sipping slowly early on. Increase this amount during the summertime. Sipping slowly will not stretch your new stomach. Drinking too fast or gulping liquids will cause brief discomfort and early could cause staple line disruption (leak). With eating, tiny bites, then chew, chew, chew, and swallow. Lay your fork/spoon down for 2-3 minutes, and then take your next bite. Your pouch will tell you within 1-2 bites if it is going to tolerate what you are eating.   Protein Vendors:  Refer to protein vendors' handout from consult class. You can always find protein drinks at the bariatric office, grocery stores, [employer], drug [employer], [employer], health food stores, and on the Internet. Find one high in protein (15-30 grams per serving) and low carb (less than 18 grams per serving).  Now is a great time to re-read your . Please review specific instructions given to you at discharge by your physician (surgeon).  HOW/WHEN TO CONTACT US:  It is imperative that you contact us with any of the following:    Ÿ fever greater than 101 degrees  Ÿ shortness of breath  Ÿ leg swelling  Ÿ body aches  Ÿ shaking chills  Ÿ nausea and vomitting  Ÿ pain that has worsened  Ÿ redness at incision sites  Ÿ pus or foul smelling drainage from an incision or wound  Ÿ inability to keep fluids down for more than a day  Ÿ any other condition you feel needs our attention.  [facility]ariat[facility]: [phone] call this number anytime 24 hours  a day / 7 days a week.  Teach-back Questions to be answered by the patient prior to discharge.   What complications would prompt you to call your doctor when you return home? _________________    What is the purpose of your prescribed medication? ________________  What are some potential side effects of the medications you will be taking at home? _______________

## 2017-06-13 NOTE — PLAN OF CARE
Problem: Patient Care Overview (Adult)  Goal: Plan of Care Review  Outcome: Ongoing (interventions implemented as appropriate)    06/13/17 0247   Coping/Psychosocial Response Interventions   Plan Of Care Reviewed With patient   Patient Care Overview   Progress improving   Outcome Evaluation   Outcome Summary/Follow up Plan VSS; Pt received pain meds x2; Up walking x4 in hallways; Received IV banana bag and IV antibiotics; Will continue to monitor         Problem: Pain, Acute (Adult)  Goal: Acceptable Pain Control/Comfort Level  Outcome: Ongoing (interventions implemented as appropriate)    Problem: Bariatric Surgery (Open/Laparoscopic) (Adult,Pediatric)  Goal: Signs and Symptoms of Listed Potential Problems Will be Absent or Manageable (Bariatric Surgery)  Outcome: Ongoing (interventions implemented as appropriate)

## 2017-06-13 NOTE — PLAN OF CARE
Problem: Patient Care Overview (Adult)  Goal: Plan of Care Review  Outcome: Ongoing (interventions implemented as appropriate)    06/13/17 1231   Coping/Psychosocial Response Interventions   Plan Of Care Reviewed With patient   Patient Care Overview   Progress improving   Outcome Evaluation   Outcome Summary/Follow up Plan Vital signs stable. Pain controlled with prn medications. Ambulating well. Patient to be discharged home. Tolerating stage 1 diet well.        Goal: Adult Individualization and Mutuality  Outcome: Ongoing (interventions implemented as appropriate)  Goal: Discharge Needs Assessment  Outcome: Ongoing (interventions implemented as appropriate)    Problem: Perioperative Period (Adult)  Goal: Signs and Symptoms of Listed Potential Problems Will be Absent or Manageable (Perioperative Period)  Outcome: Ongoing (interventions implemented as appropriate)    Problem: Pain, Acute (Adult)  Goal: Identify Related Risk Factors and Signs and Symptoms  Outcome: Ongoing (interventions implemented as appropriate)  Goal: Acceptable Pain Control/Comfort Level  Outcome: Ongoing (interventions implemented as appropriate)    Problem: Bariatric Surgery (Open/Laparoscopic) (Adult,Pediatric)  Goal: Signs and Symptoms of Listed Potential Problems Will be Absent or Manageable (Bariatric Surgery)  Outcome: Ongoing (interventions implemented as appropriate)

## 2017-06-14 NOTE — DISCHARGE SUMMARY
Discharge Summary    Patient name: Angelina Narayan    Medical record number: [id number]    Admission date: 6/12/2017  Discharge date:  6/13/2017    Attending physician: Dr. Job Douglass    Primary care physician: WALT Hatfield    Referring physician: Job Douglass Jr., MD  [address] [address]    Condition on discharge: Stable    Primary Diagnoses:  Morbid obesity with co-morbidities    Operative Procedure:  Laparoscopic gastric sleeve with hiatal hernia repair and lysis of adhesions    Hospital Course: The patient is a very pleasant 61 y.o. female that was admitted to the hospital with morbid obesity with co-morbidities. Patient underwent laparoscopic sleeve gastrectomy with hiatal hernia and lysis of adhesions (see OP note) without complication. The patient was then admitted to the bariatric unit per protocol where they remained stable. POD #1 she was started on a stage 1 bariatric diet which she tolerated so she was able to be discharged home in good condition.        Discharge medications:    Angelina Narayan   Home Medication Instructions ROGELIO:[id number]    Printed on:06/14/17 1497   Medication Information                      cetirizine (zyrTEC) 10 MG tablet  Take 10 mg by mouth Daily.             folic acid (FOLVITE) 1 MG tablet  Take 1 tablet by mouth Daily.             gabapentin (NEURONTIN) 600 MG tablet  Take 600 mg by mouth Every Night.             HYDROcodone-acetaminophen (HYCET) 7.5-325 MG/15ML solution  Take 15 mL by mouth Every 6 (Six) Hours As Needed for Moderate Pain (4-6) for up to 10 days.             hydroxychloroquine (PLAQUENIL) 200 MG tablet  Take 200 mg by mouth 2 (Two) Times a Day.             levothyroxine (SYNTHROID, LEVOTHROID) 100 MCG tablet  Take 100 mcg by mouth Daily.             lisinopril (PRINIVIL,ZESTRIL) 5 MG tablet  Take 5 mg by mouth Daily.             omeprazole (priLOSEC) 40 MG capsule  Take 40 mg by mouth Daily.             TRULICITY 1.5 MG/0.5ML  solution pen-injector  Inject 1 application under the skin 1 (One) Time Per Week. SUNDAY                 Discharge instructions:  Per Bariatric manual; per our protocol      Follow-up appointment: Follow up with Dr. Douglass in the office as scheduled.  If not already scheduled call for appointment at [phone].

## 2017-06-15 ENCOUNTER — OFFICE VISIT (OUTPATIENT)
Dept: BARIATRICS/WEIGHT MGMT | Facility: CLINIC | Age: 62
End: 2017-06-15

## 2017-06-15 VITALS
HEIGHT: 65 IN | WEIGHT: 264 LBS | SYSTOLIC BLOOD PRESSURE: 118 MMHG | RESPIRATION RATE: 16 BRPM | TEMPERATURE: 98.6 F | BODY MASS INDEX: 43.99 KG/M2 | DIASTOLIC BLOOD PRESSURE: 71 MMHG | HEART RATE: 82 BPM

## 2017-06-15 DIAGNOSIS — E66.9 DIABETES MELLITUS TYPE 2 IN OBESE (HCC): ICD-10-CM

## 2017-06-15 DIAGNOSIS — K21.9 GASTROESOPHAGEAL REFLUX DISEASE WITHOUT ESOPHAGITIS: ICD-10-CM

## 2017-06-15 DIAGNOSIS — E03.9 HYPOTHYROIDISM, UNSPECIFIED TYPE: ICD-10-CM

## 2017-06-15 DIAGNOSIS — Z71.3 DIETARY COUNSELING: ICD-10-CM

## 2017-06-15 DIAGNOSIS — R53.82 CHRONIC FATIGUE: ICD-10-CM

## 2017-06-15 DIAGNOSIS — E11.69 DIABETES MELLITUS TYPE 2 IN OBESE (HCC): ICD-10-CM

## 2017-06-15 DIAGNOSIS — G89.18 POST-OP PAIN: ICD-10-CM

## 2017-06-15 DIAGNOSIS — E66.01 OBESITY, CLASS III, BMI 40-49.9 (MORBID OBESITY) (HCC): Primary | ICD-10-CM

## 2017-06-15 DIAGNOSIS — G47.33 OSA (OBSTRUCTIVE SLEEP APNEA): ICD-10-CM

## 2017-06-15 PROBLEM — K44.9 HIATAL HERNIA: Status: RESOLVED | Noted: 2017-02-21 | Resolved: 2017-06-15

## 2017-06-15 PROCEDURE — 99024 POSTOP FOLLOW-UP VISIT: CPT | Performed by: NURSE PRACTITIONER

## 2017-06-15 RX ORDER — METOCLOPRAMIDE 10 MG/1
10 TABLET ORAL
Qty: 120 TABLET | Refills: 0 | Status: SHIPPED | OUTPATIENT
Start: 2017-06-15 | End: 2017-07-13

## 2017-06-15 NOTE — PROGRESS NOTES
[facility] BARIATRIC SURGERY  [address]  [facility] [id number] [phone]  [address]  [place] [address] [phone]  Dept: [phone]  6/15/2017      Angelina Narayan.  [id number]  [id number]  1955  female      Chief Complaint   Patient presents with   • Follow-up     s/p gastric sleeve c/o post op abdominal soreness, dysphagia and constipation       BH Post-Op Bariatric Surgery:   Angelina Narayan is status post laparopscopic Sleeve procedure, performed on 06/12/17.     HPI:   Today's weight is 264 lb (120 kg) pounds, today's BMI is Body mass index is 43.93 kg/(m^2)., she has a  loss of 8 pounds since the last visit and her weight loss since surgery is 8 pounds. The patient reports a decreased portion size and loss of appetite.  Angelina Narayan denies nausea, vomiting, dysphagia, or heartburn. The patient c/o post-op pain that is improving. she is doing well with protein and water intake so far. Taking their vitamins, walking and using IS. Denies fevers, chills, chest pain or shortness of air.      Diet and Exercise: Diet history reviewed and discussed with the patient. Weight loss/gains to date discussed with the patient. No carbonated beverage consumption and exercising regularly- walking frequently.   Supplements: multivitamins, B-12, calcium, iron, B-1 and Vitamin D.     Review of Systems   Gastrointestinal: Positive for abdominal pain (post op).   All other systems reviewed and are negative.      Patient Active Problem List   Diagnosis   • Obesity, Class III, BMI 40-49.9 (morbid obesity)   • RIVERA (obstructive sleep apnea)   • GERD (gastroesophageal reflux disease)   • Diabetes mellitus type 2 in obese   • Chronic fatigue   • Edema   • History of gastric ulcer   • Multiple joint pain   • Depression   • Hypothyroidism   • Gastric polyps   • Post-op pain   • Dietary counseling       The following portions of the patient's history were reviewed  and updated as appropriate: allergies, current medications, past family history, past medical history, past social history, past surgical history and problem list.    Vitals:    06/15/17 1015   BP: 118/71   Pulse: 82   Resp: 16   Temp: 98.6 °F (37 °C)       Physical Exam   Constitutional: She is oriented to person, place, and time. She appears well-developed and well-nourished.   HENT:   Head: Normocephalic and atraumatic.   Eyes: EOM are normal.   Cardiovascular: Normal rate, regular rhythm and normal heart sounds.    Pulmonary/Chest: Effort normal and breath sounds normal.   Abdominal: Soft. Bowel sounds are normal. She exhibits no distension. There is tenderness (post op).   Musculoskeletal: Normal range of motion.   Neurological: She is alert and oriented to person, place, and time.   Skin: Skin is warm and dry.   Incisions healing well   Psychiatric: She has a normal mood and affect. Her behavior is normal. Judgment and thought content normal.   Vitals reviewed.      Assessment:   Post-op, the patient is doing well.     Plan:   Reviewed with patient the importance of following the manual for diet progression. Increase activity as tolerated. Continue increasing daily intake of protein and water.   Return to work: the patient is to return to 3 weeks from their surgery date with no restrictions unless they develop medical problems in which we will see them back in the office. They received a note in our office today with their return to work date.  Activity restrictions: no lifting, pushing or pulling over 25lbs for 3 weeks.   Recommended patient be sure to get at least 70 grams of protein per day. Discussed with the patient the recommended amount of water per day to intake. Reviewed vitamin requirements. Be sure to do routine exercise and increase activity as tolerated. No asa, nsaids or steroids for 8 weeks. Patient may use miralax as needed if necessary.     Instructions / Recommendations: dietary counseling  recommended, recommended a daily protein intake of  grams, vitamin supplement(s) recommended, recommended exercising at least 150 minutes per week, behavior modifications recommended and instructed to call the office for concerns, questions, or problems.     The patient was instructed to follow up at one month follow up appt.     The patient was counseled regarding post op bariatric manual

## 2017-07-11 ENCOUNTER — HOSPITAL ENCOUNTER (OUTPATIENT)
Dept: SLEEP MEDICINE | Facility: HOSPITAL | Age: 62
Discharge: HOME | End: 2017-07-11
Admitting: INTERNAL MEDICINE

## 2017-07-11 PROCEDURE — G0463 HOSPITAL OUTPT CLINIC VISIT: HCPCS

## 2017-07-12 ENCOUNTER — DOCUMENTATION (OUTPATIENT)
Dept: PULMONOLOGY | Facility: HOSPITAL | Age: 62
End: 2017-07-12

## 2017-07-12 NOTE — PROGRESS NOTES
[facility]  Telephone: [phone] / Fax: [phone] [place]  Telephone: [phone] / Fax: [phone] Dang Perdomo    Referring Physician: Elier Ortiz MD  PCP: WALT Hatfield    Reason for visit:  Sleep disorders f/u     Angelina Narayan was last seen in [facility] on 5/2/17 for evaluation of RIVERA. Her AHI on download review at that time was within acceptable range. However she reported recurrent nocturnal arousals and residual daytime sleepiness. Overnight oximetry on CPAP RA was ordered. However, patient states that it was not performed. She continues to wake up frequently at night and her fit bit reports frequent arousals. She goes to bed at 10pm and is up at 5am. She gets about 6-7 hours of sleep per night. She had gastric sleeve procedure in the interval and it went well. She is working on weight loss. She would like to change DME provider. She uses her machine every night and benefits from it. 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 She reports very restless legs at night. Discomfort is better with movement and not relieved by Gabapentin 600mg.    Caffeine usage: coffee 1 /day  Alcohol usage: none  Freeburn Sleepiness Scale is recorded as 2.    Current Medications:    Current Outpatient Prescriptions:   •  cetirizine (zyrTEC) 10 MG tablet, Take 10 mg by mouth Daily., Disp: , Rfl:   •  folic acid (FOLVITE) 1 MG tablet, Take 1 tablet by mouth Daily., Disp: 40 tablet, Rfl: 0  •  gabapentin (NEURONTIN) 600 MG tablet, Take 600 mg by mouth Every Night., Disp: , Rfl:   •  hydroxychloroquine (PLAQUENIL) 200 MG tablet, Take 200 mg by mouth 2 (Two) Times a Day., Disp: , Rfl:   •  levothyroxine (SYNTHROID, LEVOTHROID) 100 MCG tablet, Take 100 mcg by mouth Daily., Disp: , Rfl:   •  lisinopril (PRINIVIL,ZESTRIL) 5 MG tablet, Take 5 mg by mouth Daily., Disp: , Rfl:   •  metoclopramide (REGLAN) 10 MG tablet, Take 1 tablet by mouth 4 (Four) Times a Day Before Meals & at Bedtime.,  Disp: 120 tablet, Rfl: 0  •  omeprazole (priLOSEC) 40 MG capsule, Take 40 mg by mouth Daily., Disp: , Rfl:   •  TRULICITY 1.5 MG/0.5ML solution pen-injector, Inject 1 application under the skin 1 (One) Time Per Week. SUNDAY, Disp: , Rfl:     I have reviewed the Past Medical History, Past Surgical History, Social History and Family History.    Review of Systems   and as recorded in Sleep Questionnaire.    Vital Signs: HT 65inches, , BMI: 42, /66, HR 89            General: Alert. Cooperative. Well developed. No acute distress.             Head:  Normocephalic. Symmetrical. Atraumatic.              Eyes: Sclera clear. No icterus. PERRLA. Normal EOM.             Ears: No deformities. Normal hearing.             Nose: No septal deviation. No drainage.          Throat: No oral lesions. No thrush. Moist mucous membranes. Tongue is enalrged       Pharynx: Posterior pharyngeal pillars are narrow with a Mallampati score of IV (only hard palate visible)    and pharynx is moist.   Chest Wall:  No abnormalities observed.             Neck:  Trachea midline. No JVD.          Lungs:  Clear to auscultation bilaterally. No wheezes. No rhonchi. No rales. Respirations regular, even and unlabored.            Heart:  Regular rhythm and normal rate. Normal S1 and S2. No murmur.     Abdomen:  Soft, non-tender and non-distended. Normal bowel sounds. No masses. No organomegaly. No guarding. No rebound tenderness.  Extremities:  Moves all extremities well. No cyanosis. No redness. No edema.           Pulses: Pulses palpable and equal bilaterally.               Skin: Dry. Intact. No bleeding. No rash.           Neuro: Moves all 4 extremities and cranial nerves grossly intact.  Psychiatric: Normal mood and affect.    Testing:  · Good compliance on CPAP 11cm    DME Company: [employer]    Impression:  1. Hypersomnia- resolved.   2. Obstructive sleep apnea- corrected with CPAP.   3. Obesity- weight loss advised, now s/p gastric sleeve  4.  RLS- on Gabapentin.    5. GERD- controlled.   6. 's license-.   7. Hypothyroidism- on Synthroid.    Plan:   She is using the CPAP device and benefits from its use in terms of reduction of hypersomnia and snoring. Her resdiual AHI is WNL. No objection for CDL renewal.  She continues to report RLS symptoms despite Gabapentin. I will add Pramipexole to her regimen. She was asked to reduce caffeine in the diet and monitor Hgb with PCP. Low iron stores can worsen RLS. I have given her a copy of download to take to [employer] along with clearance form. She is unhappy with her DME provider at present and would like to change to [facility].  Will send orders there to renew all her supplies. I will also ask [facility] to do overnight oximetry on CPAP RA to ensure desaturation does not play a role in nocturnal arousals.    She will f/u with Dr. Ortiz in 6months.    Thank you for allowing me to participate in your patient's care.    WALT Smith  [facility] SLEEP LAB PROVIDER SCHEDULE  7/11/2017

## 2017-07-13 ENCOUNTER — TREATMENT (OUTPATIENT)
Dept: BARIATRICS/WEIGHT MGMT | Facility: CLINIC | Age: 62
End: 2017-07-13

## 2017-07-13 DIAGNOSIS — R53.82 CHRONIC FATIGUE: ICD-10-CM

## 2017-07-13 DIAGNOSIS — G47.33 OSA (OBSTRUCTIVE SLEEP APNEA): ICD-10-CM

## 2017-07-13 DIAGNOSIS — E11.69 DIABETES MELLITUS TYPE 2 IN OBESE (HCC): ICD-10-CM

## 2017-07-13 DIAGNOSIS — E66.9 DIABETES MELLITUS TYPE 2 IN OBESE (HCC): ICD-10-CM

## 2017-07-13 DIAGNOSIS — Z71.3 DIETARY COUNSELING: ICD-10-CM

## 2017-07-13 DIAGNOSIS — M25.50 MULTIPLE JOINT PAIN: ICD-10-CM

## 2017-07-13 DIAGNOSIS — E66.01 OBESITY, CLASS III, BMI 40-49.9 (MORBID OBESITY) (HCC): Primary | ICD-10-CM

## 2017-07-13 PROBLEM — G89.18 POST-OP PAIN: Status: RESOLVED | Noted: 2017-06-15 | Resolved: 2017-07-13

## 2017-07-13 PROCEDURE — 99024 POSTOP FOLLOW-UP VISIT: CPT | Performed by: NURSE PRACTITIONER

## 2017-07-13 NOTE — PROGRESS NOTES
[facility] BARIATRIC SURGERY  [address]  [facility] [address] [phone]  [address]  [facility] [address] [phone]  Dept: [phone]  7/13/2017      Angelina Narayan.  [id number]  [id number]  1955  female    CC: Post-op     BH Post-Op Bariatric Surgery:   Angelina Narayan is status post laparopscopic Sleeve procedure, performed on 6/12/17    HPI:   Today's weight is   pounds, today's BMI is There is no height or weight on file to calculate BMI., she has a  loss of 17 pounds since the last visit and her weight loss since surgery is 25 pounds. The patient reports a decreased portion size and loss of appetite.      Angelina Narayan denies nausea, vomiting, dysphagia, abdominal pain or heartburn.     Diet and Exercise: Diet history reviewed and discussed with the patient. Weight loss/gains to date discussed with the patient. The patient states they are eating 60-80 grams of protein per day. She reports eating 3 meals per day, a typical portion size of 1 cup, eating 2 snacks per day, drinking 8 or more 8-oz. glasses of water per day, no carbonated beverage consumption and exercising regularly- walking 3 days per week.    Supplements: bariatric advantage MTV with iron     Review of Systems   Constitutional: Negative for fatigue and unexpected weight change.   HENT: Negative.    Eyes: Negative.    Respiratory: Negative.    Cardiovascular: Negative.  Negative for leg swelling.   Gastrointestinal: Negative for abdominal distention, abdominal pain, constipation, diarrhea, nausea and vomiting.   Genitourinary: Negative for difficulty urinating, frequency and urgency.   Musculoskeletal: Negative for back pain.   Skin: Negative.    Psychiatric/Behavioral: Negative.    All other systems reviewed and are negative.      Patient Active Problem List   Diagnosis   • Obesity, Class III, BMI 40-49.9 (morbid obesity)   • RIVERA (obstructive sleep apnea)   • GERD  (gastroesophageal reflux disease)   • Diabetes mellitus type 2 in obese   • Chronic fatigue   • Edema   • History of gastric ulcer   • Multiple joint pain   • Depression   • Hypothyroidism   • Gastric polyps   • Dietary counseling       The following portions of the patient's history were reviewed and updated as appropriate: allergies, current medications, past family history, past medical history, past social history, past surgical history and problem list.    There were no vitals filed for this visit.    Physical Exam   Constitutional: She appears well-developed and well-nourished.   Neck: No thyromegaly present.   Cardiovascular: Normal rate, regular rhythm and normal heart sounds.    Pulmonary/Chest: Effort normal and breath sounds normal. No respiratory distress. She has no wheezes.   Abdominal: Soft. Bowel sounds are normal. She exhibits no distension. There is no tenderness. There is no guarding. No hernia.   Musculoskeletal: She exhibits no edema or tenderness.   Neurological: She is alert.   Skin: Skin is warm and dry. No rash noted. No erythema.   Psychiatric: She has a normal mood and affect. Her behavior is normal.   Nursing note and vitals reviewed.        Assessment:   Post-op, the patient is doing well.     Plan:     Encouraged patient to be sure to get plenty of lean protein per day through small frequent meals all with a protein source.   Activity restrictions: none.   Recommended patient be sure to get at least 70 grams of protein per day by eating small, frequent meals all with high lean protein choices. Be sure to limit/cut back on daily carbohydrate intake. Discussed with the patient the recommended amount of water per day to intake- half of body weight in ounces. Reviewed vitamin requirements. Be sure to do routine exercise, 150 minutes per week minimum, including both cardio and strength training.     Instructions / Recommendations: dietary counseling recommended, recommended a daily protein  intake of  grams, vitamin supplement(s) recommended, recommended exercising at least 150 minutes per week, behavior modifications recommended and instructed to call the office for concerns, questions, or problems.     The patient was instructed to follow up in 2 months .     The patient was counseled regarding dietary progression, protein supplements, exercise, fluid intake. Total time spent face to face was 25 minutes and more than half the time was spent counseling.

## 2017-07-18 ENCOUNTER — APPOINTMENT (OUTPATIENT)
Dept: SLEEP MEDICINE | Facility: HOSPITAL | Age: 62
End: 2017-07-18

## 2017-08-02 ENCOUNTER — TELEPHONE (OUTPATIENT)
Dept: SLEEP MEDICINE | Facility: HOSPITAL | Age: 62
End: 2017-08-02

## 2017-08-09 ENCOUNTER — LAB (OUTPATIENT)
Dept: LAB | Facility: HOSPITAL | Age: 62
End: 2017-08-09

## 2017-08-09 DIAGNOSIS — G47.33 OSA (OBSTRUCTIVE SLEEP APNEA): ICD-10-CM

## 2017-08-09 DIAGNOSIS — Z71.3 DIETARY COUNSELING: ICD-10-CM

## 2017-08-09 DIAGNOSIS — E66.9 DIABETES MELLITUS TYPE 2 IN OBESE (HCC): ICD-10-CM

## 2017-08-09 DIAGNOSIS — M25.50 MULTIPLE JOINT PAIN: ICD-10-CM

## 2017-08-09 DIAGNOSIS — E11.69 DIABETES MELLITUS TYPE 2 IN OBESE (HCC): ICD-10-CM

## 2017-08-09 DIAGNOSIS — E66.01 OBESITY, CLASS III, BMI 40-49.9 (MORBID OBESITY) (HCC): ICD-10-CM

## 2017-08-09 DIAGNOSIS — R53.82 CHRONIC FATIGUE: ICD-10-CM

## 2017-08-09 LAB
25(OH)D3 SERPL-MCNC: 29 NG/ML
ALBUMIN SERPL-MCNC: 4.1 G/DL (ref 3.5–5.2)
ALBUMIN/GLOB SERPL: 1.5 G/DL
ALP SERPL-CCNC: 84 U/L (ref 40–129)
ALT SERPL W P-5'-P-CCNC: 27 U/L (ref 5–33)
ANION GAP SERPL CALCULATED.3IONS-SCNC: 11.6 MMOL/L
AST SERPL-CCNC: 23 U/L (ref 5–32)
BASOPHILS # BLD AUTO: 0.06 10*3/MM3 (ref 0–0.2)
BASOPHILS NFR BLD AUTO: 0.9 % (ref 0–2)
BILIRUB SERPL-MCNC: 0.8 MG/DL (ref 0.2–1.2)
BUN BLD-MCNC: 7 MG/DL (ref 6–20)
BUN/CREAT SERPL: 10.9 (ref 7–25)
CALCIUM SPEC-SCNC: 9 MG/DL (ref 8.6–10.5)
CHLORIDE SERPL-SCNC: 98 MMOL/L (ref 98–107)
CO2 SERPL-SCNC: 26.4 MMOL/L (ref 22–29)
CREAT BLD-MCNC: 0.64 MG/DL (ref 0.57–1)
DEPRECATED RDW RBC AUTO: 44.2 FL (ref 37–54)
EOSINOPHIL # BLD AUTO: 0.15 10*3/MM3 (ref 0.1–0.3)
EOSINOPHIL NFR BLD AUTO: 2.2 % (ref 0–4)
ERYTHROCYTE [DISTWIDTH] IN BLOOD BY AUTOMATED COUNT: 13.6 % (ref 11.5–14.5)
FERRITIN SERPL-MCNC: 76.76 NG/ML (ref 13–150)
FOLATE SERPL-MCNC: >20 NG/ML (ref 4.78–24.2)
GFR SERPL CREATININE-BSD FRML MDRD: 95 ML/MIN/1.73
GLOBULIN UR ELPH-MCNC: 2.7 GM/DL
GLUCOSE BLD-MCNC: 96 MG/DL (ref 65–99)
HCT VFR BLD AUTO: 45.4 % (ref 37–47)
HGB BLD-MCNC: 14.6 G/DL (ref 12–16)
IMM GRANULOCYTES # BLD: 0.02 10*3/MM3 (ref 0–0.03)
IMM GRANULOCYTES NFR BLD: 0.3 % (ref 0–0.5)
IRON 24H UR-MRATE: 50 MCG/DL (ref 37–145)
LYMPHOCYTES # BLD AUTO: 1.39 10*3/MM3 (ref 0.6–4.8)
LYMPHOCYTES NFR BLD AUTO: 20.5 % (ref 20–45)
MAGNESIUM SERPL-MCNC: 2.1 MG/DL (ref 1.7–2.5)
MCH RBC QN AUTO: 28.6 PG (ref 27–31)
MCHC RBC AUTO-ENTMCNC: 32.2 G/DL (ref 31–37)
MCV RBC AUTO: 89 FL (ref 81–99)
MONOCYTES # BLD AUTO: 0.5 10*3/MM3 (ref 0–1)
MONOCYTES NFR BLD AUTO: 7.4 % (ref 3–8)
NEUTROPHILS # BLD AUTO: 4.66 10*3/MM3 (ref 1.5–8.3)
NEUTROPHILS NFR BLD AUTO: 68.7 % (ref 45–70)
NRBC BLD MANUAL-RTO: 0 /100 WBC (ref 0–0)
PHOSPHATE SERPL-MCNC: 3.3 MG/DL (ref 2.7–4.5)
PLATELET # BLD AUTO: 218 10*3/MM3 (ref 140–500)
PMV BLD AUTO: 12.1 FL (ref 7.4–10.4)
POTASSIUM BLD-SCNC: 4.3 MMOL/L (ref 3.5–5.2)
PREALB SERPL-MCNC: 19.8 MG/DL (ref 20–40)
PROT SERPL-MCNC: 6.8 G/DL (ref 6–8.5)
RBC # BLD AUTO: 5.1 10*6/MM3 (ref 4.2–5.4)
SODIUM BLD-SCNC: 136 MMOL/L (ref 136–145)
WBC NRBC COR # BLD: 6.78 10*3/MM3 (ref 4.8–10.8)

## 2017-08-09 PROCEDURE — 84590 ASSAY OF VITAMIN A: CPT

## 2017-08-09 PROCEDURE — 84134 ASSAY OF PREALBUMIN: CPT

## 2017-08-09 PROCEDURE — 83921 ORGANIC ACID SINGLE QUANT: CPT

## 2017-08-09 PROCEDURE — 80053 COMPREHEN METABOLIC PANEL: CPT

## 2017-08-09 PROCEDURE — 83540 ASSAY OF IRON: CPT

## 2017-08-09 PROCEDURE — 36415 COLL VENOUS BLD VENIPUNCTURE: CPT

## 2017-08-09 PROCEDURE — 82306 VITAMIN D 25 HYDROXY: CPT

## 2017-08-09 PROCEDURE — 85025 COMPLETE CBC W/AUTO DIFF WBC: CPT

## 2017-08-09 PROCEDURE — 82728 ASSAY OF FERRITIN: CPT

## 2017-08-09 PROCEDURE — 84446 ASSAY OF VITAMIN E: CPT

## 2017-08-09 PROCEDURE — 84100 ASSAY OF PHOSPHORUS: CPT

## 2017-08-09 PROCEDURE — 84597 ASSAY OF VITAMIN K: CPT

## 2017-08-09 PROCEDURE — 84630 ASSAY OF ZINC: CPT

## 2017-08-09 PROCEDURE — 84425 ASSAY OF VITAMIN B-1: CPT

## 2017-08-09 PROCEDURE — 83735 ASSAY OF MAGNESIUM: CPT

## 2017-08-09 PROCEDURE — 82746 ASSAY OF FOLIC ACID SERUM: CPT

## 2017-08-11 LAB — ZINC SERPL-MCNC: 69 UG/DL (ref 56–134)

## 2017-08-12 LAB — METHYLMALONATE SERPL-SCNC: 110 NMOL/L (ref 0–378)

## 2017-08-13 LAB
A-TOCOPHEROL VIT E SERPL-MCNC: 10.2 MG/L (ref 5.3–16.8)
VIT A SERPL-MCNC: 40 UG/DL (ref 20–65)

## 2017-08-15 LAB
PHYTONADIONE SERPL-MCNC: 0.6 NG/ML (ref 0.13–1.88)
VIT B1 BLD-SCNC: 182.5 NMOL/L (ref 66.5–200)

## 2017-09-06 ENCOUNTER — OFFICE VISIT (OUTPATIENT)
Dept: BARIATRICS/WEIGHT MGMT | Facility: CLINIC | Age: 62
End: 2017-09-06

## 2017-09-06 VITALS
WEIGHT: 228.5 LBS | HEIGHT: 65 IN | SYSTOLIC BLOOD PRESSURE: 130 MMHG | HEART RATE: 63 BPM | TEMPERATURE: 97.5 F | RESPIRATION RATE: 16 BRPM | DIASTOLIC BLOOD PRESSURE: 79 MMHG | BODY MASS INDEX: 38.07 KG/M2

## 2017-09-06 DIAGNOSIS — G47.33 OSA (OBSTRUCTIVE SLEEP APNEA): ICD-10-CM

## 2017-09-06 DIAGNOSIS — E66.9 DIABETES MELLITUS TYPE 2 IN OBESE (HCC): ICD-10-CM

## 2017-09-06 DIAGNOSIS — R15.9 ENCOPRESIS: ICD-10-CM

## 2017-09-06 DIAGNOSIS — E11.69 DIABETES MELLITUS TYPE 2 IN OBESE (HCC): ICD-10-CM

## 2017-09-06 DIAGNOSIS — M25.50 MULTIPLE JOINT PAIN: ICD-10-CM

## 2017-09-06 DIAGNOSIS — K21.9 GASTROESOPHAGEAL REFLUX DISEASE WITHOUT ESOPHAGITIS: ICD-10-CM

## 2017-09-06 DIAGNOSIS — E66.9 OBESITY, CLASS II, BMI 35-39.9: Primary | ICD-10-CM

## 2017-09-06 DIAGNOSIS — R53.82 CHRONIC FATIGUE: ICD-10-CM

## 2017-09-06 DIAGNOSIS — Z71.3 DIETARY COUNSELING: ICD-10-CM

## 2017-09-06 PROBLEM — E66.812 OBESITY, CLASS II, BMI 35-39.9: Status: ACTIVE | Noted: 2017-02-21

## 2017-09-06 PROCEDURE — 99024 POSTOP FOLLOW-UP VISIT: CPT | Performed by: NURSE PRACTITIONER

## 2017-09-06 NOTE — PROGRESS NOTES
[facility]ATR[facility] BARIATRIC SURGERY  [address]  [facility] [address] [phone]  [address]  [facility] [address] [phone]  Dept: [phone]  9/6/2017      Angelina Narayan.  [id number]  [id number]  1955  female      Chief Complaint   Patient presents with   • Follow-up     3 month f/u [facility] Post-Op Bariatric Surgery:   Angelina Narayan is status post Laparoscopic Sleeve procedure, performed on 6/12/17     HPI:   Today's weight is 228 lb 8 oz (104 kg) pounds, today's BMI is Body mass index is 38.02 kg/(m^2)., she has a  loss of 35.5 pounds since the last visit and her weight loss since surgery is 43.5 pounds. The patient reports a decreased portion size and loss of appetite.      Angelina Narayan denies nausea, vomiting, dysphagia, abdominal pain or heartburn.     Diet and Exercise: Diet history reviewed and discussed with the patient. Weight loss/gains to date discussed with the patient. The patient states they are eating 60-70 grams of protein per day. She reports eating 3 meals per day, a typical portion size of 1 cup, eating 2 snacks per day, drinking 6 or more 8-oz. glasses of water per day, no carbonated beverage consumption. Denies regular exercise. Reports some mild hair thinning and ongoing constipation.    Patient reports constipation as well as leakage of liquid stool.     Supplements: OTC MTV.     Review of Systems   Constitutional: Positive for appetite change. Negative for fatigue and unexpected weight change.   HENT: Negative.    Eyes: Negative.    Respiratory: Negative.    Cardiovascular: Negative.  Negative for leg swelling.   Gastrointestinal: Positive for constipation. Negative for abdominal distention, abdominal pain, diarrhea, nausea and vomiting.   Genitourinary: Negative for difficulty urinating, frequency and urgency.   Musculoskeletal: Negative for back pain.   Skin: Negative.    Psychiatric/Behavioral: Negative.    All  other systems reviewed and are negative.      Patient Active Problem List   Diagnosis   • Obesity, Class II, BMI 35-39.9   • RIVERA (obstructive sleep apnea)   • GERD (gastroesophageal reflux disease)   • Diabetes mellitus type 2 in obese   • Chronic fatigue   • Edema   • History of gastric ulcer   • Multiple joint pain   • Depression   • Hypothyroidism   • Gastric polyps   • Dietary counseling       Past Medical History:   Diagnosis Date   • Anemia    • DDD (degenerative disc disease), lumbar    • Diabetes mellitus    • Disease of thyroid gland     HYPOTHYROID   • Fatigue    • GERD (gastroesophageal reflux disease)    • Heartburn    • Hiatal hernia    • Hypertension    • Joint pain    • RIVERA (obstructive sleep apnea)     CPAP   • RLS (restless legs syndrome)        The following portions of the patient's history were reviewed and updated as appropriate: allergies, current medications, past family history, past medical history, past social history, past surgical history and problem list.    Vitals:    09/06/17 1000   BP: 130/79   Pulse: 63   Resp: 16   Temp: 97.5 °F (36.4 °C)       Physical Exam   Constitutional: She appears well-developed and well-nourished.   Neck: No thyromegaly present.   Cardiovascular: Normal rate, regular rhythm and normal heart sounds.    Pulmonary/Chest: Effort normal and breath sounds normal. No respiratory distress. She has no wheezes.   Abdominal: Soft. Bowel sounds are normal. She exhibits no distension. There is no tenderness. There is no guarding. No hernia.   Musculoskeletal: She exhibits no edema or tenderness.   Neurological: She is alert.   Skin: Skin is warm and dry. No rash noted. No erythema.   Psychiatric: She has a normal mood and affect. Her behavior is normal.   Nursing note and vitals reviewed.        Assessment:   Post-op, the patient is doing well.     Encounter Diagnoses   Name Primary?   • Obesity, Class II, BMI 35-39.9 Yes   • RIVERA (obstructive sleep apnea)    •  Gastroesophageal reflux disease without esophagitis    • Diabetes mellitus type 2 in obese    • Multiple joint pain    • Chronic fatigue    • Dietary counseling        Plan:     Encouraged patient to be sure to get plenty of lean protein per day through small frequent meals all with a protein source.   Activity restrictions: none.   Recommended patient be sure to get at least 70 grams of protein per day by eating small, frequent meals all with high lean protein choices. Be sure to limit/cut back on daily carbohydrate intake. Discussed with the patient the recommended amount of water per day to intake- half of body weight in ounces. Reviewed vitamin requirements. Be sure to do routine exercise, 150 minutes per week minimum, including both cardio and strength training.     Discussed the importance of a bariatric specific vitamin. Discussed how bariatric surgery plays into insulin sensitivity and goal for daily carbohydrate intake.     Patient encouraged to add daily metamucil to her regimen and to add daily mirilax for the next few months as needed to help regular bowel habits.     Instructions / Recommendations: dietary counseling recommended, recommended a daily protein intake of  grams, vitamin supplement(s) recommended, recommended exercising at least 150 minutes per week, behavior modifications recommended and instructed to call the office for concerns, questions, or problems.     The patient was instructed to follow up in 3 months .     The patient was counseled regarding exercise, fluid intake, protein intake, follow up. Total time spent face to face was 25 minutes and 20 minutes was spent counseling.

## 2017-12-06 ENCOUNTER — OFFICE VISIT (OUTPATIENT)
Dept: BARIATRICS/WEIGHT MGMT | Facility: CLINIC | Age: 62
End: 2017-12-06

## 2017-12-06 ENCOUNTER — LAB (OUTPATIENT)
Dept: LAB | Facility: HOSPITAL | Age: 62
End: 2017-12-06

## 2017-12-06 VITALS
SYSTOLIC BLOOD PRESSURE: 128 MMHG | HEIGHT: 65 IN | BODY MASS INDEX: 34.99 KG/M2 | HEART RATE: 62 BPM | TEMPERATURE: 97.6 F | WEIGHT: 210 LBS | RESPIRATION RATE: 16 BRPM | DIASTOLIC BLOOD PRESSURE: 76 MMHG

## 2017-12-06 DIAGNOSIS — E66.9 OBESITY, CLASS I, BMI 30-34.9: Primary | ICD-10-CM

## 2017-12-06 DIAGNOSIS — R53.82 CHRONIC FATIGUE: ICD-10-CM

## 2017-12-06 DIAGNOSIS — K21.9 GASTROESOPHAGEAL REFLUX DISEASE WITHOUT ESOPHAGITIS: ICD-10-CM

## 2017-12-06 DIAGNOSIS — Z71.3 DIETARY COUNSELING: ICD-10-CM

## 2017-12-06 DIAGNOSIS — M25.50 MULTIPLE JOINT PAIN: ICD-10-CM

## 2017-12-06 DIAGNOSIS — E66.9 OBESITY, CLASS I, BMI 30-34.9: ICD-10-CM

## 2017-12-06 DIAGNOSIS — G47.33 OSA (OBSTRUCTIVE SLEEP APNEA): ICD-10-CM

## 2017-12-06 PROBLEM — E66.811 OBESITY, CLASS I, BMI 30-34.9: Status: ACTIVE | Noted: 2017-02-21

## 2017-12-06 LAB
ALBUMIN SERPL-MCNC: 4.2 G/DL (ref 3.5–5.2)
ALBUMIN/GLOB SERPL: 1.4 G/DL
ALP SERPL-CCNC: 78 U/L (ref 40–129)
ALT SERPL W P-5'-P-CCNC: 16 U/L (ref 5–33)
ANION GAP SERPL CALCULATED.3IONS-SCNC: 12.5 MMOL/L
AST SERPL-CCNC: 20 U/L (ref 5–32)
BASOPHILS # BLD AUTO: 0.05 10*3/MM3 (ref 0–0.2)
BASOPHILS NFR BLD AUTO: 0.9 % (ref 0–2)
BILIRUB SERPL-MCNC: 0.7 MG/DL (ref 0.2–1.2)
BUN BLD-MCNC: 9 MG/DL (ref 8–23)
BUN/CREAT SERPL: 13.4 (ref 7–25)
CALCIUM SPEC-SCNC: 9.6 MG/DL (ref 8.8–10.5)
CHLORIDE SERPL-SCNC: 100 MMOL/L (ref 98–107)
CO2 SERPL-SCNC: 26.5 MMOL/L (ref 22–29)
CREAT BLD-MCNC: 0.67 MG/DL (ref 0.57–1)
DEPRECATED RDW RBC AUTO: 41.1 FL (ref 37–54)
EOSINOPHIL # BLD AUTO: 0.1 10*3/MM3 (ref 0.1–0.3)
EOSINOPHIL NFR BLD AUTO: 1.8 % (ref 0–4)
ERYTHROCYTE [DISTWIDTH] IN BLOOD BY AUTOMATED COUNT: 12.6 % (ref 11.5–14.5)
FERRITIN SERPL-MCNC: 74 NG/ML (ref 13–150)
GFR SERPL CREATININE-BSD FRML MDRD: 90 ML/MIN/1.73
GLOBULIN UR ELPH-MCNC: 2.9 GM/DL
GLUCOSE BLD-MCNC: 83 MG/DL (ref 65–99)
HCT VFR BLD AUTO: 43.7 % (ref 37–47)
HGB BLD-MCNC: 14.6 G/DL (ref 12–16)
IMM GRANULOCYTES # BLD: 0.01 10*3/MM3 (ref 0–0.03)
IMM GRANULOCYTES NFR BLD: 0.2 % (ref 0–0.5)
IRON 24H UR-MRATE: 63 MCG/DL (ref 37–145)
LYMPHOCYTES # BLD AUTO: 1.7 10*3/MM3 (ref 0.6–4.8)
LYMPHOCYTES NFR BLD AUTO: 29.9 % (ref 20–45)
MCH RBC QN AUTO: 29.6 PG (ref 27–31)
MCHC RBC AUTO-ENTMCNC: 33.4 G/DL (ref 31–37)
MCV RBC AUTO: 88.6 FL (ref 81–99)
MONOCYTES # BLD AUTO: 0.44 10*3/MM3 (ref 0–1)
MONOCYTES NFR BLD AUTO: 7.7 % (ref 3–8)
NEUTROPHILS # BLD AUTO: 3.39 10*3/MM3 (ref 1.5–8.3)
NEUTROPHILS NFR BLD AUTO: 59.5 % (ref 45–70)
NRBC BLD MANUAL-RTO: 0 /100 WBC (ref 0–0)
PLATELET # BLD AUTO: 234 10*3/MM3 (ref 140–500)
PMV BLD AUTO: 11 FL (ref 7.4–10.4)
POTASSIUM BLD-SCNC: 4.2 MMOL/L (ref 3.5–5.2)
PROT SERPL-MCNC: 7.1 G/DL (ref 6–8.5)
RBC # BLD AUTO: 4.93 10*6/MM3 (ref 4.2–5.4)
SODIUM BLD-SCNC: 139 MMOL/L (ref 136–145)
WBC NRBC COR # BLD: 5.69 10*3/MM3 (ref 4.8–10.8)

## 2017-12-06 PROCEDURE — 36415 COLL VENOUS BLD VENIPUNCTURE: CPT

## 2017-12-06 PROCEDURE — 82728 ASSAY OF FERRITIN: CPT

## 2017-12-06 PROCEDURE — 80053 COMPREHEN METABOLIC PANEL: CPT

## 2017-12-06 PROCEDURE — 83540 ASSAY OF IRON: CPT

## 2017-12-06 PROCEDURE — 83921 ORGANIC ACID SINGLE QUANT: CPT

## 2017-12-06 PROCEDURE — 99214 OFFICE O/P EST MOD 30 MIN: CPT | Performed by: NURSE PRACTITIONER

## 2017-12-06 PROCEDURE — 85025 COMPLETE CBC W/AUTO DIFF WBC: CPT

## 2017-12-06 PROCEDURE — 84425 ASSAY OF VITAMIN B-1: CPT

## 2017-12-06 RX ORDER — PRAMIPEXOLE DIHYDROCHLORIDE 0.25 MG/1
TABLET ORAL
COMMUNITY
Start: 2017-11-24 | End: 2018-01-09

## 2017-12-06 NOTE — PROGRESS NOTES
[facility] BARIATRIC SURGERY  [address]  [facility] [address] [phone]  [address]  [facility] [address] [phone]  Dept: [phone]  12/6/2017      Angelina Narayan.  [id number]  [id number]  1955  female      Chief Complaint   Patient presents with   • Follow-up     6 month followup [facility] Post-Op Bariatric Surgery:   Angelina Narayan is status post Laparoscopic Sleeve procedure, performed on 6/12/17     HPI:   Today's weight is 95.3 kg (210 lb) pounds, today's BMI is Body mass index is 34.95 kg/(m^2)., she has a  loss of 18 pounds since the last visit and her weight loss since surgery is 62 pounds. The patient reports a decreased portion size and loss of appetite.      Angelina Narayan denies nausea, vomiting, dysphagia, abdominal pain or heartburn.     Diet and Exercise: Diet history reviewed and discussed with the patient. Weight loss/gains to date discussed with the patient. The patient states they are eating 70 grams of protein per day. She reports eating 3 meals per day, a typical portion size of 1/2 cup, eating 1-2 snacks per day, drinking 6-8 or more 8-oz. glasses of water per day, no carbonated beverage consumption and exercising regularly.     Supplements: flintstone vitamin, bariactive set, and nascobal    Review of Systems   Constitutional: Positive for appetite change. Negative for fatigue and unexpected weight change.   HENT: Negative.    Eyes: Negative.    Respiratory: Negative.    Cardiovascular: Negative.  Negative for leg swelling.   Gastrointestinal: Positive for constipation. Negative for abdominal distention, abdominal pain, diarrhea, nausea and vomiting.   Genitourinary: Negative for difficulty urinating, frequency and urgency.   Musculoskeletal: Negative for back pain.   Skin: Negative.    Psychiatric/Behavioral: Negative.    All other systems reviewed and are negative.      Patient Active Problem List   Diagnosis   •  Obesity, Class I, BMI 30-34.9   • RIVERA (obstructive sleep apnea)   • GERD (gastroesophageal reflux disease)   • Diabetes mellitus type 2 in obese   • Chronic fatigue   • Edema   • History of gastric ulcer   • Multiple joint pain   • Depression   • Hypothyroidism   • Gastric polyps   • Dietary counseling   • Encopresis       Past Medical History:   Diagnosis Date   • Anemia    • DDD (degenerative disc disease), lumbar    • Diabetes mellitus    • Disease of thyroid gland     HYPOTHYROID   • Fatigue    • GERD (gastroesophageal reflux disease)    • Heartburn    • Hiatal hernia    • Hypertension    • Joint pain    • RIVERA (obstructive sleep apnea)     CPAP   • RLS (restless legs syndrome)        The following portions of the patient's history were reviewed and updated as appropriate: allergies, current medications, past family history, past medical history, past social history, past surgical history and problem list.    Vitals:    12/06/17 0955   BP: 128/76   Pulse: 62   Resp: 16   Temp: 97.6 °F (36.4 °C)       Physical Exam   Constitutional: She appears well-developed and well-nourished.   Neck: No thyromegaly present.   Cardiovascular: Normal rate, regular rhythm and normal heart sounds.    Pulmonary/Chest: Effort normal and breath sounds normal. No respiratory distress. She has no wheezes.   Abdominal: Soft. Bowel sounds are normal. She exhibits no distension. There is no tenderness. There is no guarding. No hernia.   Musculoskeletal: She exhibits no edema or tenderness.   Neurological: She is alert.   Skin: Skin is warm and dry. No rash noted. No erythema.   Psychiatric: She has a normal mood and affect. Her behavior is normal.   Nursing note and vitals reviewed.        Assessment:   Post-op, the patient is doing well.     Encounter Diagnoses   Name Primary?   • Obesity, Class I, BMI 30-34.9 Yes   • RIVERA (obstructive sleep apnea)    • Gastroesophageal reflux disease without esophagitis    • Multiple joint pain    •  Chronic fatigue    • Dietary counseling        Plan:     Encouraged patient to be sure to get plenty of lean protein per day through small frequent meals all with a protein source.   Activity restrictions: none.   Recommended patient be sure to get at least 70 grams of protein per day by eating small, frequent meals all with high lean protein choices. Be sure to limit/cut back on daily carbohydrate intake. Discussed with the patient the recommended amount of water per day to intake- half of body weight in ounces. Reviewed vitamin requirements. Be sure to do routine exercise, 150 minutes per week minimum, including both cardio and strength training.     Instructions / Recommendations: dietary counseling recommended, recommended a daily protein intake of  grams, vitamin supplement(s) recommended, recommended exercising at least 150 minutes per week, behavior modifications recommended and instructed to call the office for concerns, questions, or problems.     The patient was instructed to follow up in 3 months .     The patient was counseled regarding bowel regimen, exercise, vitamin supplementation, lab work. Total time spent face to face was 20 minutes and 15 minutes was spent counseling.

## 2017-12-11 LAB — METHYLMALONATE SERPL-SCNC: 119 NMOL/L (ref 0–378)

## 2017-12-13 LAB — VIT B1 BLD-SCNC: 173.8 NMOL/L (ref 66.5–200)

## 2018-01-09 ENCOUNTER — OFFICE VISIT (OUTPATIENT)
Dept: SLEEP MEDICINE | Facility: HOSPITAL | Age: 63
End: 2018-01-09
Attending: INTERNAL MEDICINE

## 2018-01-09 VITALS
HEART RATE: 63 BPM | HEIGHT: 65 IN | DIASTOLIC BLOOD PRESSURE: 69 MMHG | WEIGHT: 217 LBS | BODY MASS INDEX: 36.15 KG/M2 | SYSTOLIC BLOOD PRESSURE: 117 MMHG

## 2018-01-09 DIAGNOSIS — G47.33 OBSTRUCTIVE SLEEP APNEA: Primary | ICD-10-CM

## 2018-01-09 DIAGNOSIS — G25.81 RESTLESS LEGS SYNDROME (RLS): ICD-10-CM

## 2018-01-09 DIAGNOSIS — E66.9 OBESITY (BMI 30-39.9): ICD-10-CM

## 2018-01-09 PROCEDURE — G0463 HOSPITAL OUTPT CLINIC VISIT: HCPCS

## 2018-01-09 RX ORDER — PRAMIPEXOLE DIHYDROCHLORIDE 0.5 MG/1
0.5 TABLET ORAL NIGHTLY
Qty: 30 TABLET | Refills: 5 | Status: SHIPPED | OUTPATIENT
Start: 2018-01-09 | End: 2018-02-08

## 2018-01-09 RX ORDER — PRAMIPEXOLE DIHYDROCHLORIDE 0.5 MG/1
0.5 TABLET ORAL NIGHTLY
Qty: 30 TABLET | Refills: 5 | Status: SHIPPED | OUTPATIENT
Start: 2018-01-09 | End: 2018-01-09 | Stop reason: SDUPTHER

## 2018-01-09 NOTE — PROGRESS NOTES
[facility] SLEEP MEDICINE  [address]  3rd Floor  [facility] [address]  [phone]    PCP: WALT Hatfield    Reason for visit:  Sleep disorders: RIVERA    Angelina Narayan is a 62 y.o.female who was seen in the Sleep Disorders Center today. She is not sleeping well at all. We discussed tapering off gabapentin and increasing pramipexole. She is able to fall asleep but multiple arousals at night. Sleeps from 8:30-10:30pm to 4:30am. She wakes up at least 5-6 times. She wakes up feeling rested. ESS is 2. She had gastric sleeve in June 2017. She has lost 70lbs ! She is using nasal pillows with her CPAP and it fits well. No air leaks. No dry mouth. She is unsure of her dose of Pramipexole ? 1 mg. She takes 2 tylenol PM to keep from arousals, but doesn't help. She is tapering off her gabapentin (for RLS). She is a .    No cigs, no alc, 2 caff.    Current Medications:    Current Outpatient Prescriptions:   •  cetirizine (zyrTEC) 10 MG tablet, Take 10 mg by mouth Daily., Disp: , Rfl:   •  gabapentin (NEURONTIN) 600 MG tablet, Take 600 mg by mouth Every Night., Disp: , Rfl:   •  hydroxychloroquine (PLAQUENIL) 200 MG tablet, Take 200 mg by mouth 2 (Two) Times a Day., Disp: , Rfl:   •  levothyroxine (SYNTHROID, LEVOTHROID) 100 MCG tablet, Take 75 mcg by mouth Daily., Disp: , Rfl:   •  pramipexole (MIRAPEX) 0.25 MG tablet, , Disp: , Rfl:    also entered in Sleep Questionnaire    Patient  has a past medical history of Anemia; DDD (degenerative disc disease), lumbar; Diabetes mellitus; Disease of thyroid gland; Fatigue; GERD (gastroesophageal reflux disease); Heartburn; Hiatal hernia; Hypertension; Joint pain; RIVERA (obstructive sleep apnea); and RLS (restless legs syndrome).   I have reviewed the Past Medical History, Past Surgical History, Social History and Family History in EPIC and Sleep Questionnaire.    Pertinent Positive Review of Systems of denies  Rest of Review of Systems was negative as  "recorded in Sleep Questionnaire.        Vital Signs: /69  Pulse 63  Ht 165.1 cm (65\")  Wt 98.4 kg (217 lb)  BMI 36.11 kg/m2        General: Alert. Cooperative. Well developed. No acute distress.             Head:  Normocephalic. Symmetrical. Atraumatic.              Nose: No septal deviation. No drainage.          Throat: No oral lesions. No thrush. Moist mucous membranes.    Tongue is normal and dentition is intact       Pharynx: Posterior pharyngeal pillars are wide with Mallampati score of Class I     Chest Wall:  Normal shape. Symmetric expansion with respiration. No tenderness.             Neck:  Trachea midline.           Lungs:  Clear to auscultation bilaterally. No wheezes. No rhonchi. No rales. Respirations regular, even and unlabored.            Heart:  Regular rhythm and normal rate. Normal S1 and S2. No murmur.     Abdomen:  Soft, non-tender and non-distended. Normal bowel sounds. No masses.  Extremities:  Moves all extremities well. No edema.    Psychiatric: Normal mood and affect.    Study:  · 3/20/15   Diagnostic study 10:40 p.m. to 2:02 a.m., sleep efficiency was poor  at 50% with 1.69 hours total sleep time. Sleep distribution was abnormal with  absence of REM sleep; however, the study was adequate for diagnostic purposes.  The apnea hypopnea index was very, very severe at 66 events an hour. Due to  absence of supine and REM sleep, severity may be underestimated. Oxygen  saturation fell below 90% for 12% of total sleep time. Arousal index was 61.  The patient had 63% time snoring.     Following above, CPAP titration study from 2:02 a.m. to 6:34 a.m., sleep  efficiency was improved to 70%. Rebound in REM sleep to 35%. Apnea hypopnea,  orthopnea index was decreased to less than 5. Oxygen saturation during the  night improved. Arousal index improved. Periodic limb movement index improved.  Snoring index improved. CPAP was increased up to 10 cm water pressure. At 9 cm  water pressure, AHI was " less than 5 and no significant desaturations. The  patient achieved supine and REM sleep.     Testing:  · 93% compliance; avg 6hrs 38mins; ahi 2.3; set 11cms    DME Company: [employer]    Impression:  1. Obstructive sleep apnea    2. Obesity (BMI 30-39.9)    3. Restless legs syndrome (RLS)        Plan:  She has lost a lot of wt. Try reducing the pressure to 8cms. Her machine is not an autoCPAP.    She is apparently on 0.25 mg of pramipexole.  I will increase the dose to 0.5 mg daily at bedtime. She will also stop her gabapentin and tylenol PM completely.    I reiterated the importance of effective treatment of obstructive sleep apnea with PAP machine.  Cardiovascular health risks of untreated sleep apnea were again reviewed.  Patient was asked to remain cautious if there is persistent hypersomnolence.    Change of PAP supplies regularly is important for effective use.  Change of cushion on the mask or plugs on nasal pillows along with disposable filters once every month and change of mask frame, tubing, headgear and Velcro straps every 6 months at the minimum was reiterated.    This patient is compliant with PAP machine and benefits from its use.  Apnea hypopneas index is corrected/improved.  Daytime hypersomnolence has resolved.     Patient will follow up in this clinic in 3 months     Thank you for allowing me to participate in your patient's care.    Elier Ortiz MD  [facility] SLEEP MEDICINE  [address] [place] [address]  3rd Floor  [facility] [address]  Dept: [phone]  Dept Fax: [phone]  Loc: [phone]    Part of this note may be an electronic transcription/translation of spoken language to printed text using the Dragon Dictation System.

## 2018-02-02 ENCOUNTER — LAB (OUTPATIENT)
Dept: LAB | Facility: HOSPITAL | Age: 63
End: 2018-02-02

## 2018-02-02 ENCOUNTER — TRANSCRIBE ORDERS (OUTPATIENT)
Dept: ADMINISTRATIVE | Facility: HOSPITAL | Age: 63
End: 2018-02-02

## 2018-02-02 DIAGNOSIS — E55.9 VITAMIN D DEFICIENCY DISEASE: ICD-10-CM

## 2018-02-02 DIAGNOSIS — E55.9 VITAMIN D DEFICIENCY DISEASE: Primary | ICD-10-CM

## 2018-02-02 LAB — 25(OH)D3 SERPL-MCNC: 31.9 NG/ML

## 2018-02-02 PROCEDURE — 36415 COLL VENOUS BLD VENIPUNCTURE: CPT

## 2018-02-02 PROCEDURE — 82306 VITAMIN D 25 HYDROXY: CPT

## 2018-03-07 ENCOUNTER — OFFICE VISIT (OUTPATIENT)
Dept: BARIATRICS/WEIGHT MGMT | Facility: CLINIC | Age: 63
End: 2018-03-07

## 2018-03-07 VITALS
BODY MASS INDEX: 35.49 KG/M2 | TEMPERATURE: 98 F | DIASTOLIC BLOOD PRESSURE: 85 MMHG | HEART RATE: 65 BPM | HEIGHT: 65 IN | RESPIRATION RATE: 16 BRPM | SYSTOLIC BLOOD PRESSURE: 127 MMHG | WEIGHT: 213 LBS

## 2018-03-07 DIAGNOSIS — Z71.3 DIETARY COUNSELING: ICD-10-CM

## 2018-03-07 DIAGNOSIS — R53.82 CHRONIC FATIGUE: ICD-10-CM

## 2018-03-07 DIAGNOSIS — G47.33 OSA (OBSTRUCTIVE SLEEP APNEA): ICD-10-CM

## 2018-03-07 DIAGNOSIS — E66.9 OBESITY, CLASS I, BMI 30-34.9: Primary | ICD-10-CM

## 2018-03-07 DIAGNOSIS — K21.9 GASTROESOPHAGEAL REFLUX DISEASE WITHOUT ESOPHAGITIS: ICD-10-CM

## 2018-03-07 DIAGNOSIS — E66.9 DIABETES MELLITUS TYPE 2 IN OBESE (HCC): ICD-10-CM

## 2018-03-07 DIAGNOSIS — E11.69 DIABETES MELLITUS TYPE 2 IN OBESE (HCC): ICD-10-CM

## 2018-03-07 PROCEDURE — 99213 OFFICE O/P EST LOW 20 MIN: CPT | Performed by: NURSE PRACTITIONER

## 2018-03-07 RX ORDER — PRAMIPEXOLE DIHYDROCHLORIDE 0.5 MG/1
0.5 TABLET ORAL 3 TIMES DAILY
COMMUNITY
End: 2018-08-20 | Stop reason: SDUPTHER

## 2018-03-07 NOTE — PROGRESS NOTES
"[facility] BARIATRIC [facility] BARIATRIC SURGERY  [address]  [facility] [address] [phone]  [address]  [facility] [address] [phone]  Dept: [phone]  3/7/2018      Angelina Narayan.  [id number]  [id number]  1955  female      Chief Complaint   Patient presents with   • Follow-up     9 month followup [facility] Post-Op Bariatric Surgery:   Angelina Narayan is status post Laparoscopic Sleeve procedure, performed on 6/12/17     HPI:   Today's weight is 96.6 kg (213 lb) pounds, today's BMI is Body mass index is 35.45 kg/(m^2)., she has a  gain of 3 pounds since the last visit and her weight loss since surgery is 59 pounds. The patient reports a decreased portion size and loss of appetite.      Angelina Narayan denies nausea, vomiting, dysphagia, abdominal pain or heartburn.     Diet and Exercise: Diet history reviewed and discussed with the patient. Weight loss/gains to date discussed with the patient. The patient states they are eating 70 grams of protein per day. She reports eating 5 meals per day, a typical portion size of 1-2 cup, eating 2 snacks per day, drinking 6 or more 8-oz. glasses of water per day, no carbonated beverage consumption and exercising regularly- walking twice per week. Reports that she has fallen back into some bad habits with eating out, overeating simple carbs, and eating sweets.     Patient reports an i    Is craving candy bars, candy, and M&M's.    Reports that she is dong a \"pound workout\" which is pilates with drumsticks.     Supplements: sublingual b12 and bariactive.     Review of Systems   Constitutional: Positive for appetite change. Negative for fatigue and unexpected weight change.   HENT: Negative.    Eyes: Negative.    Respiratory: Negative.    Cardiovascular: Negative.  Negative for leg swelling.   Gastrointestinal: Negative for abdominal distention, abdominal pain, constipation, diarrhea, nausea and vomiting.   Genitourinary: " Negative for difficulty urinating, frequency and urgency.   Musculoskeletal: Negative for back pain.   Skin: Negative.    Psychiatric/Behavioral: Negative.    All other systems reviewed and are negative.      Patient Active Problem List   Diagnosis   • Obesity, Class I, BMI 30-34.9   • RIVERA (obstructive sleep apnea)   • GERD (gastroesophageal reflux disease)   • Diabetes mellitus type 2 in obese   • Chronic fatigue   • Edema   • History of gastric ulcer   • Multiple joint pain   • Depression   • Hypothyroidism   • Gastric polyps   • Dietary counseling   • Encopresis       Past Medical History:   Diagnosis Date   • Anemia    • DDD (degenerative disc disease), lumbar    • Diabetes mellitus    • Disease of thyroid gland     HYPOTHYROID   • Fatigue    • GERD (gastroesophageal reflux disease)    • Heartburn    • Hiatal hernia    • Hypertension    • Joint pain    • RIVERA (obstructive sleep apnea)     CPAP   • RLS (restless legs syndrome)        The following portions of the patient's history were reviewed and updated as appropriate: allergies, current medications, past family history, past medical history, past social history, past surgical history and problem list.    Vitals:    03/07/18 0956   BP: 127/85   Pulse: 65   Resp: 16   Temp: 98 °F (36.7 °C)       Physical Exam   Constitutional: She appears well-developed and well-nourished.   Neck: No thyromegaly present.   Cardiovascular: Normal rate, regular rhythm and normal heart sounds.    Pulmonary/Chest: Effort normal and breath sounds normal. No respiratory distress. She has no wheezes.   Abdominal: Soft. Bowel sounds are normal. She exhibits no distension. There is no tenderness. There is no guarding. No hernia.   Musculoskeletal: She exhibits no edema or tenderness.   Neurological: She is alert.   Skin: Skin is warm and dry. No rash noted. No erythema.   Psychiatric: She has a normal mood and affect. Her behavior is normal.   Nursing note and vitals  reviewed.      Assessment:   Post-op, the patient is regressing. Encouraged to think about what has helped motivate her in the past.      Encounter Diagnoses   Name Primary?   • Obesity, Class I, BMI 30-34.9 Yes   • RIVERA (obstructive sleep apnea)    • Gastroesophageal reflux disease without esophagitis    • Diabetes mellitus type 2 in obese    • Chronic fatigue    • Dietary counseling        Plan:   Encouraged to come to support group, follow up with the dietitian, and we discussed what has helped her stay on track in the past. We discussed using different apps to track intake and take a closer look at getting quality nutrition, discussed programs such as weight watchers vs online support groups. Discussed trying a new exercise routine or dietary program, while continuing to meet dietary goals, to help get a fresh start.  Encouraged patient to be sure to get plenty of lean protein per day through small frequent meals all with a protein source.   Activity restrictions: none.   Recommended patient be sure to get at least 70 grams of protein per day by eating small, frequent meals all with high lean protein choices. Be sure to limit/cut back on daily carbohydrate intake. Discussed with the patient the recommended amount of water per day to intake- half of body weight in ounces. Reviewed vitamin requirements. Be sure to do routine exercise, 150 minutes per week minimum, including both cardio and strength training.     Instructions / Recommendations: dietary counseling recommended, recommended a daily protein intake of  grams, vitamin supplement(s) recommended, recommended exercising at least 150 minutes per week, behavior modifications recommended and instructed to call the office for concerns, questions, or problems.     The patient was instructed to follow up in 3 months.     The patient was counseled regarding exercise, fluid intake, lab work, protein intake. Total time spent face to face was 20 minutes and 15  minutes was spent counseling.

## 2018-04-10 ENCOUNTER — OFFICE VISIT (OUTPATIENT)
Dept: SLEEP MEDICINE | Facility: HOSPITAL | Age: 63
End: 2018-04-10
Attending: INTERNAL MEDICINE

## 2018-04-10 VITALS
SYSTOLIC BLOOD PRESSURE: 125 MMHG | WEIGHT: 210 LBS | HEART RATE: 62 BPM | HEIGHT: 65 IN | DIASTOLIC BLOOD PRESSURE: 74 MMHG | BODY MASS INDEX: 34.99 KG/M2

## 2018-04-10 DIAGNOSIS — G25.81 RESTLESS LEGS SYNDROME (RLS): ICD-10-CM

## 2018-04-10 DIAGNOSIS — G47.33 OBSTRUCTIVE SLEEP APNEA: ICD-10-CM

## 2018-04-10 DIAGNOSIS — G47.33 OSA (OBSTRUCTIVE SLEEP APNEA): Primary | ICD-10-CM

## 2018-04-10 DIAGNOSIS — Z02.4 ENCOUNTER FOR DEPARTMENT OF TRANSPORTATION (DOT) EXAMINATION FOR DRIVING LICENSE RENEWAL: ICD-10-CM

## 2018-04-10 DIAGNOSIS — E66.9 OBESITY (BMI 30-39.9): ICD-10-CM

## 2018-04-10 PROCEDURE — G0463 HOSPITAL OUTPT CLINIC VISIT: HCPCS

## 2018-04-10 RX ORDER — ZOLPIDEM TARTRATE 5 MG/1
TABLET ORAL
Qty: 2 TABLET | Refills: 0 | Status: SHIPPED | OUTPATIENT
Start: 2018-04-10 | End: 2018-05-09 | Stop reason: SDUPTHER

## 2018-04-10 NOTE — PROGRESS NOTES
"[facility] SLEEP MEDICINE  [address]  3rd Floor  [facility] [address]  [phone]    PCP: WALT Hatfield    Reason for visit:  Sleep disorders: RIVERA    Angelina Narayan is a 62 y.o.female who was seen in the Sleep Disorders Center today. At last visit she reported 70 lb wt loss after gastric bypass. We reduced her CPAP to 8 cms due to intolerance. She sleeps 10pm to 4:30am. She feels somewhat sleepy during day. Using nasal pillows, fits well, no air leaks. Her pramipexole was increased following last visit, she still has occasional RLS symptoms.  Wheeling Sleepiness Scale is 2  Angelina Narayan  reports that she does not drink alcohol. 0,  reports that she has never smoked. She has never used smokeless tobacco., caffeine intake 1 coffee    Current Medications:    Current Outpatient Prescriptions:   •  (No Medication Selected), Take 500 mg by mouth. Tumeric, Disp: , Rfl:   •  cetirizine (zyrTEC) 10 MG tablet, Take 10 mg by mouth Daily., Disp: , Rfl:   •  hydroxychloroquine (PLAQUENIL) 200 MG tablet, Take 200 mg by mouth 2 (Two) Times a Day., Disp: , Rfl:   •  levothyroxine (SYNTHROID, LEVOTHROID) 100 MCG tablet, Take 75 mcg by mouth Daily., Disp: , Rfl:   •  pramipexole (MIRAPEX) 0.5 MG tablet, Take 0.5 mg by mouth 3 (Three) Times a Day., Disp: , Rfl:   •  zolpidem (AMBIEN) 5 MG tablet, Bring to sleep lab DO NOT use at home, Disp: 2 tablet, Rfl: 0  also entered in Sleep Questionnaire    Patient  has a past medical history of Anemia; DDD (degenerative disc disease), lumbar; Diabetes mellitus; Disease of thyroid gland; Fatigue; GERD (gastroesophageal reflux disease); Heartburn; Hiatal hernia; Hypertension; Joint pain; RIVERA (obstructive sleep apnea); and RLS (restless legs syndrome).     Pertinent Positive Review of Systems of PND  Rest of Review of Systems was negative as recorded in Sleep Questionnaire.        Vital Signs: /74   Pulse 62   Ht 165.1 cm (65\")   Wt 95.3 kg (210 lb)   BMI 34.95 kg/m² "         General: Alert. Cooperative. Well developed. No acute distress.             Head:  Normocephalic. Symmetrical. Atraumatic.              Nose: No septal deviation. No drainage.          Throat: No oral lesions. No thrush. Moist mucous membranes.    Tongue is normal and dentition is intact       Pharynx: Posterior pharyngeal pillars are wide with Mallampati score of I     Chest Wall:  Normal shape. Symmetric expansion with respiration. No tenderness.             Neck:  Trachea midline.           Lungs:  Clear to auscultation bilaterally. No wheezes. No rhonchi. No rales. Respirations regular, even and unlabored.            Heart:  Regular rhythm and normal rate. Normal S1 and S2. No murmur.     Abdomen:  Soft, non-tender and non-distended. Normal bowel sounds. No masses.  Extremities:  Moves all extremities well. No edema.    Psychiatric: Normal mood and affect.    Study:  · 3/20/15   Diagnostic study 10:40 p.m. to 2:02 a.m., sleep efficiency was poor  at 50% with 1.69 hours total sleep time. Sleep distribution was abnormal with  absence of REM sleep; however, the study was adequate for diagnostic purposes.  The apnea hypopnea index was very, very severe at 66 events an hour. Due to  absence of supine and REM sleep, severity may be underestimated. Oxygen  saturation fell below 90% for 12% of total sleep time. Arousal index was 61.  The patient had 63% time snoring.      Following above, CPAP titration study from 2:02 a.m. to 6:34 a.m., sleep  efficiency was improved to 70%. Rebound in REM sleep to 35%. Apnea hypopnea,  orthopnea index was decreased to less than 5. Oxygen saturation during the  night improved. Arousal index improved. Periodic limb movement index improved.  Snoring index improved. CPAP was increased up to 10 cm water pressure. At 9 cm  water pressure, AHI was less than 5 and no significant desaturations. The  patient achieved supine and REM sleep.     Testing:  · 100% compliance last 90 days.   Average usage 6 hours 20 minutes.  AHI 2.5 at set CPAP pressure of 8 cm.    [facility] [employer]: [employer]    Impression:  1. RIVERA (obstructive sleep apnea)    2. Obstructive sleep apnea    3. Obesity (BMI 30-39.9)    4. Restless legs syndrome (RLS)    5. Encounter for Department of Transportation (DOT) examination for driving license renewal        Plan:  Patient has lost close to 70 pounds after her gastric bypass surgery.  While she has improved with lower CPAP pressures she still has 46 arousals at night.  It is not clear if she still needs to use a CPAP machine.  Given the severity of her prior study I am reluctant to simply discontinue the CPAP.  I advised the new diagnostic study to see if she qualifies for discontinuation of CPAP.  She is very interested in doing so if possible.    She will remain on pramipexole for treatment of restless leg syndrome.  Her symptoms are mostly controlled on 0.5 mg daily at bedtime.  Her last hemoglobin was 14.6 and it is unlikely she has iron deficiency anemia to explain her restless leg syndrome.  She is again cautioned about over excessive use of caffeine.    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    She has DOT. Will need renewal if still RIVERA.    RLS controlled with pramipexole at 0.5mg, was increased last visit.    I reiterated the importance of effective treatment of obstructive sleep apnea with PAP machine.  Cardiovascular health risks of untreated sleep apnea were again reviewed.  Patient was asked to remain cautious if there is persistent hypersomnolence. The benefit of weight loss in reducing severity of obstructive sleep apnea was discussed.  Patient would benefit from adhering to a strict diet to achieve ideal BMI.     Change of PAP supplies regularly is important for effective use.  Change of cushion on the mask or plugs on nasal pillows along with  disposable filters once every month and change of mask frame, tubing, headgear and Velcro straps every 6 months at the minimum was reiterated.    This patient is compliant with PAP machine and benefits from its use.  Apnea hypopneas index is corrected/improved.  Daytime hypersomnolence has resolved.     Patient will follow up in this clinic in pending test results     Thank you for allowing me to participate in your patient's care.    Elier Ortiz MD    Part of this note may be an electronic transcription/translation of spoken language to printed text using the Dragon Dictation System.

## 2018-04-27 ENCOUNTER — TRANSCRIBE ORDERS (OUTPATIENT)
Dept: SLEEP MEDICINE | Facility: HOSPITAL | Age: 63
End: 2018-04-27

## 2018-04-27 DIAGNOSIS — G47.33 OSA (OBSTRUCTIVE SLEEP APNEA): Primary | ICD-10-CM

## 2018-05-01 ENCOUNTER — HOSPITAL ENCOUNTER (OUTPATIENT)
Dept: SLEEP MEDICINE | Facility: HOSPITAL | Age: 63
Discharge: HOME | End: 2018-05-01
Admitting: INTERNAL MEDICINE

## 2018-05-01 DIAGNOSIS — G47.33 OSA (OBSTRUCTIVE SLEEP APNEA): ICD-10-CM

## 2018-05-01 PROCEDURE — 95806 SLEEP STUDY UNATT&RESP EFFT: CPT

## 2018-05-02 ENCOUNTER — APPOINTMENT (OUTPATIENT)
Dept: SLEEP MEDICINE | Facility: HOSPITAL | Age: 63
End: 2018-05-02

## 2018-05-08 ENCOUNTER — LAB (OUTPATIENT)
Dept: LAB | Facility: HOSPITAL | Age: 63
End: 2018-05-08

## 2018-05-08 ENCOUNTER — TRANSCRIBE ORDERS (OUTPATIENT)
Dept: ADMINISTRATIVE | Facility: HOSPITAL | Age: 63
End: 2018-05-08

## 2018-05-08 ENCOUNTER — OFFICE VISIT (OUTPATIENT)
Dept: SLEEP MEDICINE | Facility: HOSPITAL | Age: 63
End: 2018-05-08
Attending: INTERNAL MEDICINE

## 2018-05-08 VITALS
HEART RATE: 56 BPM | BODY MASS INDEX: 35.34 KG/M2 | DIASTOLIC BLOOD PRESSURE: 69 MMHG | WEIGHT: 207 LBS | SYSTOLIC BLOOD PRESSURE: 125 MMHG | HEIGHT: 64 IN

## 2018-05-08 DIAGNOSIS — E55.9 VITAMIN D DEFICIENCY: ICD-10-CM

## 2018-05-08 DIAGNOSIS — E55.9 VITAMIN D DEFICIENCY: Primary | ICD-10-CM

## 2018-05-08 DIAGNOSIS — Z02.4 ENCOUNTER FOR DEPARTMENT OF TRANSPORTATION (DOT) EXAMINATION FOR DRIVING LICENSE RENEWAL: ICD-10-CM

## 2018-05-08 DIAGNOSIS — G47.33 OBSTRUCTIVE SLEEP APNEA: Primary | ICD-10-CM

## 2018-05-08 DIAGNOSIS — E66.9 OBESITY (BMI 30-39.9): ICD-10-CM

## 2018-05-08 DIAGNOSIS — G25.81 RESTLESS LEGS SYNDROME (RLS): ICD-10-CM

## 2018-05-08 LAB — 25(OH)D3 SERPL-MCNC: 46.2 NG/ML

## 2018-05-08 PROCEDURE — 36415 COLL VENOUS BLD VENIPUNCTURE: CPT

## 2018-05-08 PROCEDURE — 82306 VITAMIN D 25 HYDROXY: CPT

## 2018-05-08 NOTE — PROGRESS NOTES
[facility]  [address]  3rd [address]  [place] [address]  [phone]    PCP: WALT Hatfield    Reason for visit:  Sleep disorders: RIVERA    Angelina is a 62 y.o.female who was seen in the [facility] today.  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  She has taken a new job in [place].  We had planned to see her following her home sleep test to see if she still needs to use her CPAP machine.  Unfortunately those results are not yet available.  Patient sleeps from 9 PM to 4:30 AM.  She tends to toss and turn at night.  She finds a CPAP somewhat comforting.  Her [place] score is 2.  She uses nasal pillows which fit well.  Her [employer] company is [employer].  She changes her supplies regularly.  She remains on pramipexole for treatment of her restless legs.  Additional medication may be required due to the symptoms of restlessness possibly contribution into her restless sleep.  Angelina  reports that she has never smoked. She has never used smokeless tobacco.  Angelina  reports that she does not drink alcohol. 0 per week  Angelinahas caffeine intake 2 per day coffee..  [place] Sleepiness Scale is 2.    Pertinent Positive Review of Systems of nasal congestion.  Rest of Review of Systems was negative as recorded in Sleep Questionnaire.    Current Medications:    Current Outpatient Prescriptions:   •  (No Medication Selected), Take 500 mg by mouth. Tumeric, Disp: , Rfl:   •  cetirizine (zyrTEC) 10 MG tablet, Take 10 mg by mouth Daily., Disp: , Rfl:   •  hydroxychloroquine (PLAQUENIL) 200 MG tablet, Take 200 mg by mouth 2 (Two) Times a Day., Disp: , Rfl:   •  levothyroxine (SYNTHROID, LEVOTHROID) 100 MCG tablet, Take 75 mcg by mouth Daily., Disp: , Rfl:   •  pramipexole (MIRAPEX) 0.5 MG tablet, Take 0.5 mg by mouth 3 (Three) Times a Day., Disp: , Rfl:   •  zolpidem (AMBIEN) 5 MG tablet, Bring to sleep lab DO NOT use at home, Disp: 2 tablet, Rfl: 0   also entered in Sleep  "Questionnaire    Patient  has a past medical history of Anemia; DDD (degenerative disc disease), lumbar; Diabetes mellitus; Disease of thyroid gland; Fatigue; GERD (gastroesophageal reflux disease); Heartburn; Hiatal hernia; Hypertension; Joint pain; RIVERA (obstructive sleep apnea); and RLS (restless legs syndrome).          Vital Signs: /69   Pulse 56   Ht 162.6 cm (64\")   Wt 93.9 kg (207 lb)   BMI 35.53 kg/m²    Body mass index is 35.53 kg/m².       Tongue: normal      Dentition: good       Pharynx: Posterior pharyngeal pillars are wide   Mallampatti: II (hard and soft palate, upper portion of tonsils anduvula visible)          General: Alert. Cooperative. Well developed. No acute distress.             Head:  Normocephalic. Symmetrical. Atraumatic.              Nose: No septal deviation. No drainage.          Throat: No oral lesions. No thrush. Moist mucous membranes.    Chest Wall:  Normal shape. Symmetric expansion with respiration. No tenderness.             Neck:  Trachea midline.           Lungs:  Clear to auscultation bilaterally. No wheezes. No rhonchi. No rales. Respirations regular, even and unlabored.            Heart:  Regular rhythm and normal rate. Normal S1 and S2. No murmur.     Abdomen:  Soft, non-tender and non-distended. Normal bowel sounds. No masses.  Extremities:  Moves all extremities well. No edema.    Psychiatric: Normal mood and affect.    Study:  · Split 3/20/15   Diagnostic study 10:40 p.m. to 2:02 a.m., sleep efficiency was poor  at 50% with 1.69 hours total sleep time. Sleep distribution was abnormal with  absence of REM sleep; however, the study was adequate for diagnostic purposes.  The apnea hypopnea index was very, very severe at 66 events an hour. Due to  absence of supine and REM sleep, severity may be underestimated. Oxygen  saturation fell below 90% for 12% of total sleep time. Arousal index was 61.  The patient had 63% time snoring.      Following above, CPAP titration " study from 2:02 a.m. to 6:34 a.m., sleep  efficiency was improved to 70%. Rebound in REM sleep to 35%. Apnea hypopnea,  orthopnea index was decreased to less than 5. Oxygen saturation during the  night improved. Arousal index improved. Periodic limb movement index improved.  Snoring index improved. CPAP was increased up to 10 cm water pressure. At 9 cm  water pressure, AHI was less than 5 and no significant desaturations. The  patient achieved supine and REM sleep.    · HST 5/4/48  Results pending    Testing:  · 98.9% compliance for 6 hours 14 minutes with AHI 2.9 on set pressure 8 cm.    DME Company: [employer]    Impression:  1. Obstructive sleep apnea    2. Obesity (BMI 30-39.9)    3. Encounter for Department of Transportation (DOT) examination for driving license renewal    4. Restless legs syndrome (RLS)        Plan:  Angelina is doing well with her CPAP machine at a much lower pressure.  She has lost considerable weight since her gastric bypass.  We are pending the results of a home sleep test to see if she still needs to remain with her CPAP machine.  I will give her a call back to see if she needs to do that.  She is comfortable using the machine though she remains restless in her sleep.  As she is taking a new job in [place] and has a CDL a clearance letter for her CDL was provided to her today.  She was reminded to change her supplies regularly.  I will reduce her CPAP pressures to 5 cm as her AHI remains low at 2.9.  I will obtain a download from her CPAP machine in a few weeks from naps.  Otherwise an appointment has been given to her in 6 months.    She continues to report restlessness during sleep.  She is on pramipexole 0.5 mg daily at bedtime for help with RLS.  Additional medications may be required.  I will consider this after reviewing her home sleep test.    I reiterated the importance of effective treatment of obstructive sleep apnea with PAP machine.  Cardiovascular health risks of untreated sleep  apnea were again reviewed.  Patient was asked to remain cautious if there is persistent hypersomnolence. The benefit of weight loss in reducing severity of obstructive sleep apnea was discussed.  Patient would benefit from adhering to a strict diet to achieve ideal BMI.     Change of PAP supplies regularly is important for effective use.  Change of cushion on the mask or plugs on nasal pillows along with disposable filters once every month and change of mask frame, tubing, headgear and Velcro straps every 6 months at the minimum was reiterated.    This patient is compliant with PAP machine and benefits from its use.  Apnea hypopneas index is corrected/improved.  Daytime hypersomnolence has resolved.     Patient will follow up in this clinic in 6 months      Thank you for allowing me to participate in your patient's care.    Elier Ortiz MD    Part of this note may be an electronic transcription/translation of spoken language to printed text using the Dragon Dictation System.

## 2018-05-09 DIAGNOSIS — G47.33 OSA (OBSTRUCTIVE SLEEP APNEA): Primary | ICD-10-CM

## 2018-05-09 RX ORDER — ZOLPIDEM TARTRATE 5 MG/1
TABLET ORAL
Qty: 2 TABLET | Refills: 0 | Status: SHIPPED | OUTPATIENT
Start: 2018-05-09 | End: 2018-12-28

## 2018-05-10 ENCOUNTER — TELEPHONE (OUTPATIENT)
Dept: SLEEP MEDICINE | Facility: HOSPITAL | Age: 63
End: 2018-05-10

## 2018-05-10 NOTE — TELEPHONE ENCOUNTER
Tech called and went over HST study results with patient. Scheduled in lab titration study with patient. Patient would like to be moved up if any openings available asap. MAB

## 2018-05-23 ENCOUNTER — HOSPITAL ENCOUNTER (OUTPATIENT)
Dept: SLEEP MEDICINE | Facility: HOSPITAL | Age: 63
Discharge: HOME | End: 2018-05-23
Attending: INTERNAL MEDICINE | Admitting: INTERNAL MEDICINE

## 2018-05-23 DIAGNOSIS — G47.33 OSA (OBSTRUCTIVE SLEEP APNEA): ICD-10-CM

## 2018-05-23 PROCEDURE — 95811 POLYSOM 6/>YRS CPAP 4/> PARM: CPT

## 2018-06-04 RX ORDER — CYANOCOBALAMIN 500 UG/1
SPRAY NASAL
Qty: 12 BOTTLE | Refills: 3 | Status: SHIPPED | OUTPATIENT
Start: 2018-06-04 | End: 2019-04-19 | Stop reason: SDUPTHER

## 2018-06-05 ENCOUNTER — TELEPHONE (OUTPATIENT)
Dept: SLEEP MEDICINE | Facility: HOSPITAL | Age: 63
End: 2018-06-05

## 2018-06-05 NOTE — TELEPHONE ENCOUNTER
Tech received a verbal order from Dr. Ortiz to change pt's CPAP pressure to 9cm. Pt to bring her card in for the pressure change as well as a current download.

## 2018-06-29 ENCOUNTER — OFFICE VISIT (OUTPATIENT)
Dept: BARIATRICS/WEIGHT MGMT | Facility: CLINIC | Age: 63
End: 2018-06-29

## 2018-06-29 VITALS
HEART RATE: 64 BPM | BODY MASS INDEX: 35 KG/M2 | HEIGHT: 64 IN | SYSTOLIC BLOOD PRESSURE: 121 MMHG | DIASTOLIC BLOOD PRESSURE: 74 MMHG | WEIGHT: 205 LBS | RESPIRATION RATE: 16 BRPM | TEMPERATURE: 98.8 F

## 2018-06-29 DIAGNOSIS — E11.69 DIABETES MELLITUS TYPE 2 IN OBESE (HCC): ICD-10-CM

## 2018-06-29 DIAGNOSIS — M25.50 MULTIPLE JOINT PAIN: ICD-10-CM

## 2018-06-29 DIAGNOSIS — E66.9 OBESITY, CLASS II, BMI 35-39.9: Primary | ICD-10-CM

## 2018-06-29 DIAGNOSIS — E03.9 HYPOTHYROIDISM, UNSPECIFIED TYPE: ICD-10-CM

## 2018-06-29 DIAGNOSIS — R60.9 EDEMA, UNSPECIFIED TYPE: ICD-10-CM

## 2018-06-29 DIAGNOSIS — G47.33 OSA (OBSTRUCTIVE SLEEP APNEA): ICD-10-CM

## 2018-06-29 DIAGNOSIS — E66.9 DIABETES MELLITUS TYPE 2 IN OBESE (HCC): ICD-10-CM

## 2018-06-29 DIAGNOSIS — Z71.3 DIETARY COUNSELING: ICD-10-CM

## 2018-06-29 DIAGNOSIS — R53.82 CHRONIC FATIGUE: ICD-10-CM

## 2018-06-29 PROBLEM — E66.811 OBESITY, CLASS I, BMI 30-34.9: Status: RESOLVED | Noted: 2017-02-21 | Resolved: 2018-06-29

## 2018-06-29 PROBLEM — K21.9 GERD (GASTROESOPHAGEAL REFLUX DISEASE): Status: RESOLVED | Noted: 2017-02-21 | Resolved: 2018-06-29

## 2018-06-29 PROBLEM — E66.812 OBESITY, CLASS II, BMI 35-39.9: Status: ACTIVE | Noted: 2018-06-29

## 2018-06-29 PROCEDURE — 99213 OFFICE O/P EST LOW 20 MIN: CPT | Performed by: NURSE PRACTITIONER

## 2018-06-29 NOTE — PROGRESS NOTES
[facility]  [address]  [facility] [address] [phone]  [address]  [facility] [address] [phone]  Dept: [phone]  6/29/2018      Angelina Narayan.  [id number]  [id number]  1955  female      Chief Complaint   Patient presents with   • Follow-up     1 year Sleeve       BH Post-Op Bariatric Surgery:   Angelina Narayan is status post Laparoscopic Sleeve/HH procedure, performed on 6/12/17     HPI:   Today's weight is 93 kg (205 lb) pounds, today's BMI is Body mass index is 35.17 kg/m²., she has a  loss of 8 pounds since the last visit and her weight loss since surgery is 67 pounds. The patient reports a decreased portion size and loss of appetite.      Angelina Narayan denies nausea, vomiting, dysphagia, abdominal pain or heartburn.    Patient says she has been doing well with her exercise and water. Says she got off track with some sweets but doing better. Hasn't been taking her thyroid medication and having her labs checked.     Diet and Exercise: Diet history reviewed and discussed with the patient. Weight loss/gains to date discussed with the patient. The patient states they are eating  grams of protein per day. She reports eating 5 meals per day, a typical portion size of 1 cup, eating 3 snacks per day, drinking 5+ or more 8-oz. glasses of water per day, no carbonated beverage consumption and exercising regularly- doing pound and strength training.     Supplements: nascobal b12 but hasn't been getting the MVI for the last month.     Review of Systems   All other systems reviewed and are negative.      Patient Active Problem List   Diagnosis   • RIVERA (obstructive sleep apnea)   • GERD (gastroesophageal reflux disease)   • Diabetes mellitus type 2 in obese   • Chronic fatigue   • Edema   • History of gastric ulcer   • Multiple joint pain   • Depression   • Hypothyroidism   • Gastric polyps   • Dietary counseling   •  Encopresis   • Obesity, Class II, BMI 35-39.9       Past Medical History:   Diagnosis Date   • Anemia    • DDD (degenerative disc disease), lumbar    • Diabetes mellitus    • Disease of thyroid gland     HYPOTHYROID   • Fatigue    • GERD (gastroesophageal reflux disease)    • Heartburn    • Hiatal hernia    • Hypertension    • Joint pain    • RIVERA (obstructive sleep apnea)     CPAP   • RLS (restless legs syndrome)        The following portions of the patient's history were reviewed and updated as appropriate: allergies, current medications, past family history, past medical history, past social history, past surgical history and problem list.    Vitals:    06/29/18 0849   BP: 121/74   Pulse: 64   Resp: 16   Temp: 98.8 °F (37.1 °C)       Physical Exam   Constitutional: She is oriented to person, place, and time. She appears well-developed and well-nourished.   HENT:   Head: Normocephalic and atraumatic.   Eyes: EOM are normal.   Cardiovascular: Normal rate, regular rhythm and normal heart sounds.    Pulmonary/Chest: Effort normal and breath sounds normal.   Abdominal: Soft. Bowel sounds are normal. She exhibits no distension. There is no tenderness.   Musculoskeletal: Normal range of motion.   Neurological: She is alert and oriented to person, place, and time.   Skin: Skin is warm and dry.   Psychiatric: She has a normal mood and affect. Her behavior is normal. Judgment and thought content normal.   Vitals reviewed.      Assessment:   Post-op, the patient is doing well.     Encounter Diagnoses   Name Primary?   • Obesity, Class II, BMI 35-39.9 Yes   • Dietary counseling    • RIVERA (obstructive sleep apnea)    • Diabetes mellitus type 2 in obese    • Hypothyroidism, unspecified type    • Multiple joint pain    • Chronic fatigue    • Edema, unspecified type        Plan:     Encouraged patient to be sure to get plenty of lean protein per day through small frequent meals all with a protein source. Discussed her dietary and  protein level. Continue with exercise and water. Will re-order vitamins with nascobal. Needs to have thyroid follow up. Avoid eating sweets regularly.  Activity restrictions: none.   Recommended patient be sure to get at least 70 grams of protein per day by eating small, frequent meals all with high lean protein choices. Be sure to limit/cut back on daily carbohydrate intake. Discussed with the patient the recommended amount of water per day to intake- half of body weight in ounces. Reviewed vitamin requirements. Be sure to do routine exercise, 150 minutes per week minimum, including both cardio and strength training.     Instructions / Recommendations: dietary counseling recommended, recommended a daily protein intake of  grams, vitamin supplement(s) recommended, recommended exercising at least 150 minutes per week, behavior modifications recommended and instructed to call the office for concerns, questions, or problems.     The patient was instructed to follow up in 6 months     The patient was counseled regarding. Total time spent face to face was 20 minutes and 15 minutes was spent counseling.

## 2018-08-09 ENCOUNTER — LAB (OUTPATIENT)
Dept: LAB | Facility: HOSPITAL | Age: 63
End: 2018-08-09

## 2018-08-09 DIAGNOSIS — E11.69 DIABETES MELLITUS TYPE 2 IN OBESE (HCC): ICD-10-CM

## 2018-08-09 DIAGNOSIS — M25.50 MULTIPLE JOINT PAIN: ICD-10-CM

## 2018-08-09 DIAGNOSIS — R53.82 CHRONIC FATIGUE: ICD-10-CM

## 2018-08-09 DIAGNOSIS — G47.33 OSA (OBSTRUCTIVE SLEEP APNEA): ICD-10-CM

## 2018-08-09 DIAGNOSIS — E66.9 DIABETES MELLITUS TYPE 2 IN OBESE (HCC): ICD-10-CM

## 2018-08-09 DIAGNOSIS — E66.9 OBESITY, CLASS II, BMI 35-39.9: ICD-10-CM

## 2018-08-09 DIAGNOSIS — R60.9 EDEMA, UNSPECIFIED TYPE: ICD-10-CM

## 2018-08-09 DIAGNOSIS — E03.9 HYPOTHYROIDISM, UNSPECIFIED TYPE: ICD-10-CM

## 2018-08-09 LAB
25(OH)D3 SERPL-MCNC: 43.8 NG/ML
ALBUMIN SERPL-MCNC: 4.2 G/DL (ref 3.5–5.2)
ALBUMIN/GLOB SERPL: 1.5 G/DL
ALP SERPL-CCNC: 82 U/L (ref 40–129)
ALT SERPL W P-5'-P-CCNC: 12 U/L (ref 5–33)
ANION GAP SERPL CALCULATED.3IONS-SCNC: 9.5 MMOL/L
AST SERPL-CCNC: 20 U/L (ref 5–32)
BASOPHILS # BLD AUTO: 0.04 10*3/MM3 (ref 0–0.2)
BASOPHILS NFR BLD AUTO: 1 % (ref 0–2)
BILIRUB SERPL-MCNC: 0.6 MG/DL (ref 0.2–1.2)
BUN BLD-MCNC: 12 MG/DL (ref 8–23)
BUN/CREAT SERPL: 16.7 (ref 7–25)
CALCIUM SPEC-SCNC: 9.5 MG/DL (ref 8.8–10.5)
CHLORIDE SERPL-SCNC: 100 MMOL/L (ref 98–107)
CO2 SERPL-SCNC: 31.5 MMOL/L (ref 22–29)
CREAT BLD-MCNC: 0.72 MG/DL (ref 0.57–1)
DEPRECATED RDW RBC AUTO: 42.5 FL (ref 37–54)
EOSINOPHIL # BLD AUTO: 0.09 10*3/MM3 (ref 0.1–0.3)
EOSINOPHIL NFR BLD AUTO: 2.4 % (ref 0–4)
ERYTHROCYTE [DISTWIDTH] IN BLOOD BY AUTOMATED COUNT: 12.2 % (ref 11.5–14.5)
FERRITIN SERPL-MCNC: 54 NG/ML (ref 13–150)
FOLATE SERPL-MCNC: >20 NG/ML (ref 4.78–20)
GFR SERPL CREATININE-BSD FRML MDRD: 83 ML/MIN/1.73
GLOBULIN UR ELPH-MCNC: 2.8 GM/DL
GLUCOSE BLD-MCNC: 81 MG/DL (ref 65–99)
HBA1C MFR BLD: 5.2 % (ref 4.8–5.6)
HCT VFR BLD AUTO: 42.8 % (ref 37–47)
HGB BLD-MCNC: 13.6 G/DL (ref 12–16)
IMM GRANULOCYTES # BLD: 0.01 10*3/MM3 (ref 0–0.03)
IMM GRANULOCYTES NFR BLD: 0.3 % (ref 0–0.5)
IRON 24H UR-MRATE: 63 MCG/DL (ref 37–145)
LYMPHOCYTES # BLD AUTO: 1.38 10*3/MM3 (ref 0.6–4.8)
LYMPHOCYTES NFR BLD AUTO: 36.2 % (ref 20–45)
MAGNESIUM SERPL-MCNC: 2.2 MG/DL (ref 1.7–2.5)
MCH RBC QN AUTO: 30.4 PG (ref 27–31)
MCHC RBC AUTO-ENTMCNC: 31.8 G/DL (ref 31–37)
MCV RBC AUTO: 95.5 FL (ref 81–99)
MONOCYTES # BLD AUTO: 0.37 10*3/MM3 (ref 0–1)
MONOCYTES NFR BLD AUTO: 9.7 % (ref 3–8)
NEUTROPHILS # BLD AUTO: 1.92 10*3/MM3 (ref 1.5–8.3)
NEUTROPHILS NFR BLD AUTO: 50.4 % (ref 45–70)
NRBC BLD MANUAL-RTO: 0 /100 WBC (ref 0–0)
PHOSPHATE SERPL-MCNC: 3.4 MG/DL (ref 2.7–4.5)
PLATELET # BLD AUTO: 200 10*3/MM3 (ref 140–500)
PMV BLD AUTO: 10.4 FL (ref 7.4–10.4)
POTASSIUM BLD-SCNC: 4 MMOL/L (ref 3.5–5.2)
PREALB SERPL-MCNC: 21.5 MG/DL (ref 20–40)
PROT SERPL-MCNC: 7 G/DL (ref 6–8.5)
PTH-INTACT SERPL-MCNC: 44.7 PG/ML (ref 15–65)
RBC # BLD AUTO: 4.48 10*6/MM3 (ref 4.2–5.4)
SODIUM BLD-SCNC: 141 MMOL/L (ref 136–145)
WBC NRBC COR # BLD: 3.81 10*3/MM3 (ref 4.8–10.8)

## 2018-08-09 PROCEDURE — 84425 ASSAY OF VITAMIN B-1: CPT

## 2018-08-09 PROCEDURE — 83921 ORGANIC ACID SINGLE QUANT: CPT

## 2018-08-09 PROCEDURE — 85025 COMPLETE CBC W/AUTO DIFF WBC: CPT

## 2018-08-09 PROCEDURE — 84630 ASSAY OF ZINC: CPT

## 2018-08-09 PROCEDURE — 83735 ASSAY OF MAGNESIUM: CPT

## 2018-08-09 PROCEDURE — 84134 ASSAY OF PREALBUMIN: CPT

## 2018-08-09 PROCEDURE — 83970 ASSAY OF PARATHORMONE: CPT

## 2018-08-09 PROCEDURE — 82728 ASSAY OF FERRITIN: CPT

## 2018-08-09 PROCEDURE — 84597 ASSAY OF VITAMIN K: CPT

## 2018-08-09 PROCEDURE — 84446 ASSAY OF VITAMIN E: CPT

## 2018-08-09 PROCEDURE — 83036 HEMOGLOBIN GLYCOSYLATED A1C: CPT

## 2018-08-09 PROCEDURE — 36415 COLL VENOUS BLD VENIPUNCTURE: CPT

## 2018-08-09 PROCEDURE — 83540 ASSAY OF IRON: CPT

## 2018-08-09 PROCEDURE — 84100 ASSAY OF PHOSPHORUS: CPT

## 2018-08-09 PROCEDURE — 82306 VITAMIN D 25 HYDROXY: CPT

## 2018-08-09 PROCEDURE — 84590 ASSAY OF VITAMIN A: CPT

## 2018-08-09 PROCEDURE — 80053 COMPREHEN METABOLIC PANEL: CPT

## 2018-08-09 PROCEDURE — 82746 ASSAY OF FOLIC ACID SERUM: CPT

## 2018-08-11 LAB
Lab: NORMAL
METHYLMALONATE SERPL-SCNC: 97 NMOL/L (ref 0–378)
ZINC SERPL-MCNC: 75 UG/DL (ref 56–134)

## 2018-08-12 LAB
A-TOCOPHEROL VIT E SERPL-MCNC: 13.9 MG/L (ref 9–29)
GAMMA TOCOPHEROL SERPL-MCNC: 0.6 MG/L (ref 0.5–4.9)
VIT A SERPL-MCNC: 48.7 UG/DL (ref 36.4–108)

## 2018-08-13 LAB — VIT B1 BLD-SCNC: 111.4 NMOL/L (ref 66.5–200)

## 2018-08-16 LAB — PHYTONADIONE SERPL-MCNC: 0.26 NG/ML (ref 0.13–1.88)

## 2018-08-17 ENCOUNTER — TRANSCRIBE ORDERS (OUTPATIENT)
Dept: ADMINISTRATIVE | Facility: HOSPITAL | Age: 63
End: 2018-08-17

## 2018-08-17 DIAGNOSIS — Z12.39 SCREENING BREAST EXAMINATION: Primary | ICD-10-CM

## 2018-08-20 RX ORDER — PRAMIPEXOLE DIHYDROCHLORIDE 0.5 MG/1
0.5 TABLET ORAL 3 TIMES DAILY
Qty: 270 TABLET | Refills: 3 | Status: SHIPPED | OUTPATIENT
Start: 2018-08-20 | End: 2018-12-28

## 2018-08-28 ENCOUNTER — TRANSCRIBE ORDERS (OUTPATIENT)
Dept: ADMINISTRATIVE | Facility: HOSPITAL | Age: 63
End: 2018-08-28

## 2018-08-28 ENCOUNTER — HOSPITAL ENCOUNTER (OUTPATIENT)
Dept: GENERAL RADIOLOGY | Facility: HOSPITAL | Age: 63
Discharge: HOME | End: 2018-08-28
Admitting: NURSE PRACTITIONER

## 2018-08-28 DIAGNOSIS — M79.605 LEFT LEG PAIN: Primary | ICD-10-CM

## 2018-08-28 DIAGNOSIS — M79.605 LEFT LEG PAIN: ICD-10-CM

## 2018-08-28 PROCEDURE — 73590 X-RAY EXAM OF LOWER LEG: CPT

## 2018-10-23 ENCOUNTER — APPOINTMENT (OUTPATIENT)
Dept: SLEEP MEDICINE | Facility: HOSPITAL | Age: 63
End: 2018-10-23
Attending: INTERNAL MEDICINE

## 2018-12-28 ENCOUNTER — OFFICE VISIT (OUTPATIENT)
Dept: BARIATRICS/WEIGHT MGMT | Facility: CLINIC | Age: 63
End: 2018-12-28

## 2018-12-28 VITALS
DIASTOLIC BLOOD PRESSURE: 85 MMHG | HEART RATE: 82 BPM | BODY MASS INDEX: 38.24 KG/M2 | HEIGHT: 64 IN | WEIGHT: 224 LBS | RESPIRATION RATE: 18 BRPM | SYSTOLIC BLOOD PRESSURE: 144 MMHG | TEMPERATURE: 98.3 F

## 2018-12-28 DIAGNOSIS — Z71.3 DIETARY COUNSELING: ICD-10-CM

## 2018-12-28 DIAGNOSIS — E66.9 OBESITY, CLASS II, BMI 35-39.9: Primary | ICD-10-CM

## 2018-12-28 DIAGNOSIS — R63.5 UNINTENDED WEIGHT GAIN: ICD-10-CM

## 2018-12-28 PROBLEM — I10 ESSENTIAL HYPERTENSION: Status: ACTIVE | Noted: 2018-06-08

## 2018-12-28 PROBLEM — K21.9 GASTROESOPHAGEAL REFLUX DISEASE: Status: ACTIVE | Noted: 2018-06-08

## 2018-12-28 PROCEDURE — 99214 OFFICE O/P EST MOD 30 MIN: CPT | Performed by: NURSE PRACTITIONER

## 2018-12-28 RX ORDER — LEVOTHYROXINE SODIUM 0.05 MG/1
TABLET ORAL
COMMUNITY
End: 2021-05-14

## 2018-12-28 NOTE — PROGRESS NOTES
[facility] SURGERY  [address]  [facility] [address] [phone]  [address]  [facility] [address] [phone]  Dept: [phone]  12/28/2018      Angelina Narayan.  [id number]  [id number]  1955  female      Chief Complaint   Patient presents with   • Follow-up     1.5 year sleeve follow up       BH Post-Op Bariatric Surgery:   Angelina Narayan is status post Laparoscopic Sleeve/HH procedure, performed on 6/12/17     HPI:   Today's weight is 102 kg (224 lb) pounds, today's BMI is Body mass index is 38.43 kg/m²., she has a  gain of 19 pounds since the last visit and her weight loss since surgery is 48 pounds. The patient reports a decreased portion size and loss of appetite.      Angelina Narayan denies nausea, vomiting, dysphagia, abdominal pain or heartburn.     Diet and Exercise: Diet history reviewed and discussed with the patient. Weight loss/gains to date discussed with the patient. The patient states they are eating 70 grams of protein per day. She reports eating 3 meals per day, a typical portion size of 1 cup, eating 3 snacks per day, drinking 5+ or more 8-oz. glasses of water per day, no carbonated beverage consumption and exercising regularly- pound/beach body 3-5 times per week.     Patient has been off track for the last couple months due to some life stressors/traveling etc. Says she has taken in more bread and some sweets and hasn't been exercising the last couple months    Supplements: bariatric per sleeve.     Review of Systems   All other systems reviewed and are negative.      Patient Active Problem List   Diagnosis   • RIVERA (obstructive sleep apnea)   • Diabetes mellitus type 2 in obese (CMS/HCC)   • Chronic fatigue   • Edema   • History of gastric ulcer   • Multiple joint pain   • Depression   • Hypothyroidism   • Gastric polyps   • Dietary counseling   • Encopresis   • Obesity, Class II, BMI 35-39.9   •  Gastroesophageal reflux disease   • Rheumatoid arthritis (CMS/HCC)   • Essential hypertension   • Unintended weight gain       Past Medical History:   Diagnosis Date   • Anemia    • DDD (degenerative disc disease), lumbar    • Diabetes mellitus (CMS/HCC)    • Disease of thyroid gland     HYPOTHYROID   • Fatigue    • GERD (gastroesophageal reflux disease)    • Heartburn    • Hiatal hernia    • Hypertension    • Joint pain    • RIVERA (obstructive sleep apnea)     CPAP   • RLS (restless legs syndrome)        The following portions of the patient's history were reviewed and updated as appropriate: allergies, current medications, past family history, past medical history, past social history, past surgical history and problem list.    Vitals:    12/28/18 0817   BP: 144/85   Pulse: 82   Resp: 18   Temp: 98.3 °F (36.8 °C)       Physical Exam   Constitutional: She is oriented to person, place, and time. She appears well-developed and well-nourished.   HENT:   Head: Normocephalic and atraumatic.   Eyes: EOM are normal.   Cardiovascular: Normal rate, regular rhythm and normal heart sounds.   Pulmonary/Chest: Effort normal and breath sounds normal.   Abdominal: Soft. Bowel sounds are normal. She exhibits no distension. There is no tenderness.   Musculoskeletal: Normal range of motion.   Neurological: She is alert and oriented to person, place, and time.   Skin: Skin is warm and dry.   Psychiatric: She has a normal mood and affect. Her behavior is normal. Judgment and thought content normal.   Vitals reviewed.      Assessment:   Post-op, the patient has regressed with her weight loss.     Encounter Diagnoses   Name Primary?   • Obesity, Class II, BMI 35-39.9 Yes   • Unintended weight gain    • Dietary counseling        Plan:     Encouraged patient to be sure to get plenty of lean protein per day through small frequent meals all with a protein source. Discussed dietary recommendations and gave her handouts regarding. Will follow  up with dietician. Recommended she keep a food journal. Cut out coffee creamer and cut way back on starches/sugars. Increase water intake. Start back exercising.  Activity restrictions: none.   Recommended patient be sure to get at least 70 grams of protein per day by eating small, frequent meals all with high lean protein choices. Be sure to limit/cut back on daily carbohydrate intake. Discussed with the patient the recommended amount of water per day to intake- half of body weight in ounces. Reviewed vitamin requirements. Be sure to do routine exercise, 150 minutes per week minimum, including both cardio and strength training.     Instructions / Recommendations: dietary counseling recommended, recommended a daily protein intake of  grams, vitamin supplement(s) recommended, recommended exercising at least 150 minutes per week, behavior modifications recommended and instructed to call the office for concerns, questions, or problems.     The patient was instructed to follow up in 3-6 months.     The patient was counseled regarding. Total time spent face to face was 25 minutes and 20 minutes was spent counseling.

## 2019-01-03 ENCOUNTER — TRANSCRIBE ORDERS (OUTPATIENT)
Dept: ADMINISTRATIVE | Facility: HOSPITAL | Age: 64
End: 2019-01-03

## 2019-01-03 DIAGNOSIS — Z12.39 SCREENING BREAST EXAMINATION: Primary | ICD-10-CM

## 2019-01-04 ENCOUNTER — OFFICE VISIT (OUTPATIENT)
Dept: BARIATRICS/WEIGHT MGMT | Facility: CLINIC | Age: 64
End: 2019-01-04

## 2019-01-04 DIAGNOSIS — Z71.3 DIETARY COUNSELING: Primary | ICD-10-CM

## 2019-01-04 NOTE — PROGRESS NOTES
"Bariatric Nutrition Assessment Follow-Up    Patient Name:  Angelina Narayan  YOB: 1955  Age:  63 y.o.  Sex:  female  MRN: [id number]  Date:  01/04/19    Procedure Performed:  Sleeve  Date of Surgery: June 2017      Last Documented Weight:   Wt Readings from Last 1 Encounters:   12/28/18 102 kg (224 lb)     Highest Weight: 283 lb  Lowest Weight: 207 lb  Goal Weight: 160 lb    Height: 65\"     Stated Problem Areas/Concerns: Angelina is frustrated that she has regained about 15 pounds of the weight she lost after her sleeve procedure. She states that she has fallen back into some old habits with her food choices plus has had a stressful past few months. She was frequently traveling 4 hours one way to help her brother after his cancer diagnosis. Unfortunately he passed away around [facility]. She states that she was eating more food \"on the road\" plus cooking less during that time. She states that she is a stress eater. She states that she was exercising regularly before and hopes to get back into her regular exercise routine. She feels like she has been \"stuck\" with the same 5 pound weight loss/gain for the past 8 months. She states that she used to be diabetic. Her most recent hemoglobin A1c was 5.4. She states that she has sleep apnea. She sleeps with a CPAP machine but still experiences restless sleep.     Assessment/Recommendations: Offered encouragement for Angelina's successful weight loss efforts following her sleeve procedure. Obtained her usual meal pattern and food preferences. Encouraged nutrient-dense, fiber-rich food choices. Discussed her weight-loss goals, strategies, and behavior modification tips. Discussed strategies to help manage stress eating tendencies. Discussed weight loss diet guidelines for 1200 Calorie, 70-80 grams protein,  grams carbohydrates per day. Provided written educational material for reference. Angelina voiced understanding and on diet guidelines and appears motivated to " achieve her weight loss goals.     Goals/Plan: Follow weight loss diet guidelines    Follow-Up: As needed       Electronically signed by:  Yary De Santiago RD  01/04/19 1:59 PM

## 2019-01-10 ENCOUNTER — HOSPITAL ENCOUNTER (OUTPATIENT)
Dept: MAMMOGRAPHY | Facility: HOSPITAL | Age: 64
Discharge: HOME | End: 2019-01-10
Admitting: NURSE PRACTITIONER

## 2019-01-10 DIAGNOSIS — Z12.39 SCREENING BREAST EXAMINATION: ICD-10-CM

## 2019-01-10 PROCEDURE — 77067 SCR MAMMO BI INCL CAD: CPT

## 2019-01-10 PROCEDURE — 77063 BREAST TOMOSYNTHESIS BI: CPT

## 2019-04-12 ENCOUNTER — APPOINTMENT (OUTPATIENT)
Dept: SLEEP MEDICINE | Facility: HOSPITAL | Age: 64
End: 2019-04-12

## 2019-04-19 RX ORDER — CYANOCOBALAMIN 500 UG/1
SPRAY NASAL
Qty: 12 BOTTLE | Refills: 3 | Status: SHIPPED | OUTPATIENT
Start: 2019-04-19 | End: 2021-01-19

## 2019-04-23 ENCOUNTER — OFFICE VISIT (OUTPATIENT)
Dept: SLEEP MEDICINE | Facility: HOSPITAL | Age: 64
End: 2019-04-23

## 2019-04-23 VITALS
SYSTOLIC BLOOD PRESSURE: 135 MMHG | HEART RATE: 66 BPM | HEIGHT: 65 IN | WEIGHT: 250 LBS | DIASTOLIC BLOOD PRESSURE: 73 MMHG | BODY MASS INDEX: 41.65 KG/M2

## 2019-04-23 DIAGNOSIS — G47.33 OBSTRUCTIVE SLEEP APNEA: Primary | ICD-10-CM

## 2019-04-23 DIAGNOSIS — G47.00 INSOMNIA, UNSPECIFIED TYPE: ICD-10-CM

## 2019-04-23 DIAGNOSIS — E66.01 OBESITY, MORBID, BMI 40.0-49.9 (HCC): ICD-10-CM

## 2019-04-23 DIAGNOSIS — G25.81 RESTLESS LEGS SYNDROME (RLS): ICD-10-CM

## 2019-04-23 PROCEDURE — G0463 HOSPITAL OUTPT CLINIC VISIT: HCPCS

## 2019-04-23 RX ORDER — PRAMIPEXOLE DIHYDROCHLORIDE 0.5 MG/1
0.5 TABLET ORAL NIGHTLY
Qty: 90 TABLET | Refills: 3 | Status: SHIPPED | OUTPATIENT
Start: 2019-04-23 | End: 2021-05-14

## 2019-04-23 NOTE — PROGRESS NOTES
[facility] SLEEP MEDICINE  [address]  3rd Floor  [facility] [address]  [phone]    PCP: Savannah Porter APRN    Reason for visit:  Sleep disorders: RIVERA    Angelina is a 63 y.o.female who was seen in the Sleep Disorders Center today. She has been depressed due to siblings death last Fall. She is having trouble sleeping. Sleeps from 9:30p to 4:15a. Multiple arousals at night. Using nasal cushion - Dreamwar. Fits well but wants to change to nasal mask.  Bedtime 9:30p. Falls asleep immediately. Awakens after 1 hr and then has multiple arousals but is able to fall back to sleep again fairly quickly. Denies acid reflux. She has tried melatonin. Drinks her coffee morning only. Her back and knee tends to hurt.  She takes pramipexole 0.5 mg for RLS but still some symptoms.  [place] Sleepiness Scale is 3. Caffeine 3 per day. Alcohol 0 per week.    Angelina  reports that she has never smoked. She has never used smokeless tobacco.    Pertinent Positive Review of Systems of depression  Rest of Review of Systems was negative as recorded in Sleep Questionnaire.    Patient  has a past medical history of Anemia, DDD (degenerative disc disease), lumbar, Diabetes mellitus (CMS/[facility]), Disease of thyroid gland, Fatigue, GERD (gastroesophageal reflux disease), Heartburn, Hiatal hernia, Hypertension, Joint pain, RIVERA (obstructive sleep apnea), and RLS (restless legs syndrome).     Current Medications:    Current Outpatient Medications:   •  (No Medication Selected), Take 500 mg by mouth. Tumeric, Disp: , Rfl:   •  cetirizine (zyrTEC) 10 MG tablet, Take 10 mg by mouth Daily., Disp: , Rfl:   •  hydroxychloroquine (PLAQUENIL) 200 MG tablet, Take 200 mg by mouth 2 (Two) Times a Day., Disp: , Rfl:   •  levothyroxine (SYNTHROID, LEVOTHROID) 50 MCG tablet, levothyroxine 50 mcg tablet, Disp: , Rfl:   •  NASCOBAL 500 MCG/0.1ML solution, USE 1 SPRAY IN ONE NOSTRIL ONCE A WEEK AS DIRECTED, Disp: 12 bottle, Rfl: 3  •  pramipexole  "(MIRAPEX) 0.5 MG tablet, Take 1 tablet by mouth Every Night., Disp: 90 tablet, Rfl: 3   also entered in Sleep Questionnaire         Vital Signs: /73   Pulse 66   Ht 165.1 cm (65\")   Wt 113 kg (250 lb)   BMI 41.60 kg/m²     Body mass index is 41.6 kg/m².       Tongue: normal      Dentition: good       Pharynx: Posterior pharyngeal pillars are narrow   Mallampatti: II (hard and soft palate, upper portion of tonsils anduvula visible)        General: Alert. Cooperative. Well developed. No acute distress.             Head:  Normocephalic. Symmetrical. Atraumatic.              Nose: No septal deviation. No drainage.          Throat: No oral lesions. No thrush. Moist mucous membranes.    Chest Wall:  Normal shape. Symmetric expansion with respiration. No tenderness.             Neck:  Trachea midline.           Lungs:  Clear to auscultation bilaterally. No wheezes. No rhonchi. No rales. Respirations regular, even and unlabored.            Heart:  Regular rhythm and normal rate. Normal S1 and S2. No murmur.     Abdomen:  Soft, non-tender and non-distended. Normal bowel sounds. No masses.  Extremities:  Moves all extremities well. No edema.    Psychiatric: Normal mood and affect.    Study:  · Split 3/20/15   Diagnostic study 10:40 p.m. to 2:02 a.m., sleep efficiency was poor  at 50% with 1.69 hours total sleep time. Sleep distribution was abnormal with  absence of REM sleep; however, the study was adequate for diagnostic purposes.  The apnea hypopnea index was very, very severe at 66 events an hour. Due to  absence of supine and REM sleep, severity may be underestimated. Oxygen  saturation fell below 90% for 12% of total sleep time. Arousal index was 61.  The patient had 63% time snoring.      Following above, CPAP titration study from 2:02 a.m. to 6:34 a.m., sleep  efficiency was improved to 70%. Rebound in REM sleep to 35%. Apnea hypopnea,  orthopnea index was decreased to less than 5. Oxygen saturation during " the  night improved. Arousal index improved. Periodic limb movement index improved.  Snoring index improved. CPAP was increased up to 10 cm water pressure. At 9 cm  water pressure, AHI was less than 5 and no significant desaturations. The  patient achieved supine and REM sleep.    · 5/4/18  The patient tolerated the home sleep testing with monitoring time of 415 minutes. The data obtained make this a technically adequate study. The apnea hypopneas index(AHI) was 24.5 per sleep hour.  The AHI during supine position was 26.8 per sleep hour. Mean heart rate of 60.3 BPM.  Snoring was noted 48.9% of sleep time. Lowest oxygen saturation during the study was 72%. Saturation below 89% was noted for 109 mins.     · 5/24/18  Overnight titration study from 9:21 PM to 4:06 AM.  Sleep efficiency 96%.  Normal sleep distribution.  AHI 0.3.  Adequate supine sleep and REM sleep.  Oxygen saturation remained above 88%.  No snoring.  CPAP increased from 5-9 cm water pressure.  Maximum sleep at 6 cm water pressure with maximum REM sleep at 8 cm water pressure.  Supine sleep only on the higher pressure settings.    Testing:  · 100% compliance for last 90 days with average use at 6 hours 20 minutes.  AHI 2.3 with set pressure 9 cm.    DME Company: [employer]    Impression:  1. Obstructive sleep apnea    2. Obesity, morbid, BMI 40.0-49.9 (CMS/HCC)    3. Insomnia, unspecified type    4. Restless legs syndrome (RLS)        Plan:  Angelina is doing well with current pressures.  Her recent sleep study shows that 9 cm is adequate.  Keep same pressure. Change supplies more regularly.    Sleep maintenance insomnia, could be due to arthritis pains at night, has no problem falling asleep. Can try mild analgesic such as tylenol.    RLS - using pramipexole 0.5 mg qhs. Iron levels normal.    This patient is compliant with effective treatment for obstructive sleep apnea with positive airway pressure device and has no residual sleepiness. 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     I reiterated the importance of effective treatment of obstructive sleep apnea with PAP machine.  Cardiovascular health risks of untreated sleep apnea were again reviewed.  Patient was asked to remain cautious if there is persistent hypersomnolence. The benefit of weight loss in reducing severity of obstructive sleep apnea was discussed.  Patient would benefit from adhering to a strict diet to achieve ideal BMI.     Change of PAP supplies regularly is important for effective use.  Change of cushion on the mask or plugs on nasal pillows along with disposable filters once every month and change of mask frame, tubing, headgear and Velcro straps every 6 months at the minimum was reiterated.    This patient is compliant with PAP machine and benefits from its use.  Apnea hypopneas index is corrected/improved.  Daytime hypersomnolence has resolved.     Patient will follow up in this clinic in 1 year    Thank you for allowing me to participate in your patient's care.    Elier Ortiz MD    Part of this note may be an electronic transcription/translation of spoken language to printed text using the Dragon Dictation System.

## 2019-12-20 ENCOUNTER — APPOINTMENT (OUTPATIENT)
Dept: GENERAL RADIOLOGY | Facility: HOSPITAL | Age: 64
End: 2019-12-20

## 2019-12-20 ENCOUNTER — HOSPITAL ENCOUNTER (EMERGENCY)
Facility: HOSPITAL | Age: 64
Discharge: HOME | End: 2019-12-20
Attending: EMERGENCY MEDICINE | Admitting: EMERGENCY MEDICINE

## 2019-12-20 VITALS
BODY MASS INDEX: 42.49 KG/M2 | TEMPERATURE: 98.1 F | RESPIRATION RATE: 16 BRPM | SYSTOLIC BLOOD PRESSURE: 154 MMHG | HEART RATE: 80 BPM | WEIGHT: 255 LBS | OXYGEN SATURATION: 97 % | DIASTOLIC BLOOD PRESSURE: 84 MMHG | HEIGHT: 65 IN

## 2019-12-20 DIAGNOSIS — M25.561 ACUTE PAIN OF RIGHT KNEE: Primary | ICD-10-CM

## 2019-12-20 PROCEDURE — 73560 X-RAY EXAM OF KNEE 1 OR 2: CPT

## 2019-12-20 PROCEDURE — 99282 EMERGENCY DEPT VISIT SF MDM: CPT | Performed by: EMERGENCY MEDICINE

## 2019-12-20 PROCEDURE — 99284 EMERGENCY DEPT VISIT MOD MDM: CPT

## 2019-12-20 PROCEDURE — 63710000001 DIPHENHYDRAMINE PER 50 MG: Performed by: EMERGENCY MEDICINE

## 2019-12-20 RX ORDER — DIPHENHYDRAMINE HCL 25 MG
25 CAPSULE ORAL ONCE
Status: COMPLETED | OUTPATIENT
Start: 2019-12-20 | End: 2019-12-20

## 2019-12-20 RX ORDER — HYDROCODONE BITARTRATE AND ACETAMINOPHEN 5; 325 MG/1; MG/1
1 TABLET ORAL EVERY 6 HOURS PRN
Qty: 15 TABLET | Refills: 0 | Status: SHIPPED | OUTPATIENT
Start: 2019-12-20 | End: 2021-01-19

## 2019-12-20 RX ORDER — HYDROCODONE BITARTRATE AND ACETAMINOPHEN 5; 325 MG/1; MG/1
1 TABLET ORAL ONCE
Status: COMPLETED | OUTPATIENT
Start: 2019-12-20 | End: 2019-12-20

## 2019-12-20 RX ADMIN — DIPHENHYDRAMINE HYDROCHLORIDE 25 MG: 25 CAPSULE ORAL at 17:51

## 2019-12-20 RX ADMIN — HYDROCODONE BITARTRATE AND ACETAMINOPHEN 1 TABLET: 5; 325 TABLET ORAL at 17:51

## 2020-02-28 NOTE — PLAN OF CARE
Problem: Patient Care Overview (Adult)  Goal: Plan of Care Review  Outcome: Ongoing (interventions implemented as appropriate)    06/12/17 1341   Coping/Psychosocial Response Interventions   Plan Of Care Reviewed With patient   Patient Care Overview   Progress progress toward functional goals as expected            IV fluid

## 2020-04-14 ENCOUNTER — APPOINTMENT (OUTPATIENT)
Dept: SLEEP MEDICINE | Facility: HOSPITAL | Age: 65
End: 2020-04-14

## 2020-04-28 ENCOUNTER — TRANSCRIBE ORDERS (OUTPATIENT)
Dept: ADMINISTRATIVE | Facility: HOSPITAL | Age: 65
End: 2020-04-28

## 2020-04-28 DIAGNOSIS — Z12.31 VISIT FOR SCREENING MAMMOGRAM: Primary | ICD-10-CM

## 2020-04-30 ENCOUNTER — DOCUMENTATION (OUTPATIENT)
Dept: SLEEP MEDICINE | Facility: HOSPITAL | Age: 65
End: 2020-04-30

## 2020-04-30 NOTE — PROGRESS NOTES
Angelina SAMANIEGO Sylvain  2020 12:49 PM  Location: [facility]  Patient #: [id number]  : 1955   / Language: English / Race: White  Female      History of Present Illness (Neda Pendleton APRSELMA; 2020 12:01 PM)  The patient is a 64 year old female who presents with a sleep disorder. She was seen at [facility] in 2019 and is being evaluated by me today via telemedicine at [facility] as sleep lab is currently closed. She remains on the CPAP device and denies snoring, EDS or gasping respirations. We do not have access to her smart card and are awaiting download from [facility]. She reports recurrent interruptions from sleep at night despite taking the Pramipexole which helps a lot. She drinks 3 cups of coffee a day with the last cup around noon. She does not consume any alcohol. She eats a late night snack at 9pm. She uses her phone/ipad a lot at night. She takes Trazodone 50mg to help her sleep+10mg of Melatonin. She gained a lot of weight over the past few years as she has not been exercising much due to pain from OA.  She is pending recertification for DOT.    [employer] telemedicine 20 minutes.    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  Levothyroxine Sodium  (50MCG Tablet, 1 Oral daily) Active.  Nascobal  (500MCG/0.1ML Solution, 1 spray into one nostril Nasal once a week) Active.  traZODone HCl  (50MG Tablet, 1 Oral at bedtime) Active.  Hydroxychloroquine Sulfate  (200MG Tablet, 2 Oral daily) Active.  Pramipexole Dihydrochloride  (0.5MG Tablet, 1 Oral at bedtime) Active.  Vitamin D3  (125 MCG(5000 UT) Tablet, 1 Oral daily) Active.  Multivitamin Adult  (1 Oral daily) Active.  Tylenol PM Extra Strength  (500-25MG Tablet, 2 Oral as needed) Active.  Medications Reconciled     Social History (Maru Wu; 4/28/2020 12:57 PM)  Tobacco use   Never smoker.  No alcohol use    No caffeine use    Current work status   Full-time.  Marital status   .    Family History (Maru Wu; 4/28/2020 12:59 PM)  Breast Cancer   Maternal Aunt.  Cancer   Brother.  Diabetes Mellitus   Mother.  Myocardial Infarction   Brother, Paternal Grandmother.  Heart Disease   Brother, Father.  Hypertension   Mother, Father, Brother.  Obesity   Mother.  Sleep Apnea   Mother, Brother.  Cerebrovascular Accident   Father.    Health Maintenance History (Maru Wu; 4/28/2020 1:07 PM)  Flu Vaccine  [04/28/2020]: Performed. 09/2019  Pneumovax  [04/28/2020]: 09/2019  DME Company  [04/28/2020]: [facility]  Prevnar 13  [04/28/2020]: Not Performed.    Travel History (Maru Wu; 4/28/2020 1:07 PM)  None  [04/28/2020]:        Review of Systems (Maru Wu; 4/28/2020 1:16 PM)  General Not Present- Chills, Fever, Night Sweats, Poor Appetite and Weight Loss.  Skin Not Present- Nail Changes and Rash.  HEENT Not Present- Eye Pain, Hoarseness, Nasal Congestion, Nose Bleed, Persistent Hoarseness, Post Nasal Drip, Post Nasal Drip Syndrome, Recurrent Nose Bleeds and Sores in Mouth.  Respiratory Present- Snoring. Not Present- Awakens Exhausted, Bloody sputum, Chest Discomfort/Tightness, Cough, Excessive Daytime Sleepiness, Shortness of Breath, Trouble  Falling Asleep, Wakes up from Sleep Wheezing or Short of Breath and Wheezing.  Cardiovascular Present- Swollen Ankles or Legs. Not Present- Chest Pain, Difficulty Breathing Lying Down and Palpitations.  Gastrointestinal Not Present- Belching, Difficulty Swallowing, Frequent Heartburn and Indigestion.  Female Genitourinary Not Present- Difficulty Emptying Bladder, Frequent Urination and Pregnancy.  Musculoskeletal Present- Joint Swelling and Muscle Weakness.  Neurological Not Present- Difficulty Speaking and Seizures.  Psychiatric Not Present- Anxiety and Depression.  Endocrine Not Present- Cold Intolerance and Excessive Sweating.  Hematology Not Present- Enlarged Lymph Nodes and Excessive bleeding.  All other systems negative      Physical Exam (Neda GODOY; 4/29/2020 12:00 PM)  General  General Appearance - Cooperative.  Build & Nutrition - Well developed and Obese.  Oxygen Therapy - Breathing Room Air.  Mental Status - Alert.  Voice - Normal.    Integumentary  General Characteristics  Color - normal coloration of skin.    Chest and Lung Exam  Inspection  Accessory muscles - No use of accessory muscles in breathing.      Assessment & Plan (Neda GODOY; 4/29/2020 12:02 PM)    RIVERA (obstructive sleep apnea) (G47.33)  Impression: Await d/l from [facility]. Then complete DOT paper work and submit it to the appropriate person. Subjectively she is doing great at present. She does not feel sleepy while driving. She gets enough sleep in general.    Insomnia (G47.00)  Impression: Despite trazodone and 10mg Melatonin. Long discussion today about sleep hygiene. Advised to avoid bright light in the evening hours, avoid sugary snacks at night, restrict alcohol, and start exercising.     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 Reduce caffeine    Obesity (E66.9)  Impression: Weight loss will be strongly  beneficial to reduce the severity of sleep disorder breathing.    Current Plans    Follow up in 6 months      Signed by WALT Smith (4/29/2020 12:03 PM)

## 2020-05-19 ENCOUNTER — LAB (OUTPATIENT)
Dept: LAB | Facility: HOSPITAL | Age: 65
End: 2020-05-19

## 2020-05-19 ENCOUNTER — HOSPITAL ENCOUNTER (OUTPATIENT)
Dept: MAMMOGRAPHY | Facility: HOSPITAL | Age: 65
Discharge: HOME | End: 2020-05-19
Admitting: NURSE PRACTITIONER

## 2020-05-19 ENCOUNTER — HOSPITAL ENCOUNTER (OUTPATIENT)
Dept: GENERAL RADIOLOGY | Facility: HOSPITAL | Age: 65
Discharge: HOME | End: 2020-05-19

## 2020-05-19 ENCOUNTER — TELEPHONE (OUTPATIENT)
Dept: SLEEP MEDICINE | Facility: HOSPITAL | Age: 65
End: 2020-05-19

## 2020-05-19 ENCOUNTER — TRANSCRIBE ORDERS (OUTPATIENT)
Dept: ADMINISTRATIVE | Facility: HOSPITAL | Age: 65
End: 2020-05-19

## 2020-05-19 DIAGNOSIS — Z12.31 VISIT FOR SCREENING MAMMOGRAM: ICD-10-CM

## 2020-05-19 DIAGNOSIS — M05.79 SEROPOSITIVE RHEUMATOID ARTHRITIS OF MULTIPLE SITES (HCC): ICD-10-CM

## 2020-05-19 DIAGNOSIS — M05.79 SEROPOSITIVE RHEUMATOID ARTHRITIS OF MULTIPLE SITES (HCC): Primary | ICD-10-CM

## 2020-05-19 LAB
ALBUMIN SERPL-MCNC: 4.2 G/DL (ref 3.5–5.2)
ALBUMIN/GLOB SERPL: 1.6 G/DL
ALP SERPL-CCNC: 84 U/L (ref 39–117)
ALT SERPL W P-5'-P-CCNC: 12 U/L (ref 1–33)
ANION GAP SERPL CALCULATED.3IONS-SCNC: 9.5 MMOL/L (ref 5–15)
AST SERPL-CCNC: 13 U/L (ref 1–32)
BASOPHILS # BLD AUTO: 0.06 10*3/MM3 (ref 0–0.2)
BASOPHILS NFR BLD AUTO: 0.8 % (ref 0–1.5)
BILIRUB SERPL-MCNC: 0.8 MG/DL (ref 0.2–1.2)
BUN BLD-MCNC: 11 MG/DL (ref 8–23)
BUN/CREAT SERPL: 14.7 (ref 7–25)
CALCIUM SPEC-SCNC: 9.4 MG/DL (ref 8.6–10.5)
CHLORIDE SERPL-SCNC: 104 MMOL/L (ref 98–107)
CO2 SERPL-SCNC: 26.5 MMOL/L (ref 22–29)
CREAT BLD-MCNC: 0.75 MG/DL (ref 0.57–1)
CRP SERPL-MCNC: 0.44 MG/DL (ref 0–0.5)
DEPRECATED RDW RBC AUTO: 40.7 FL (ref 37–54)
EOSINOPHIL # BLD AUTO: 0.15 10*3/MM3 (ref 0–0.4)
EOSINOPHIL NFR BLD AUTO: 1.9 % (ref 0.3–6.2)
ERYTHROCYTE [DISTWIDTH] IN BLOOD BY AUTOMATED COUNT: 12.4 % (ref 12.3–15.4)
ERYTHROCYTE [SEDIMENTATION RATE] IN BLOOD: 20 MM/HR (ref 0–30)
GFR SERPL CREATININE-BSD FRML MDRD: 78 ML/MIN/1.73
GLOBULIN UR ELPH-MCNC: 2.7 GM/DL
GLUCOSE BLD-MCNC: 99 MG/DL (ref 65–99)
HCT VFR BLD AUTO: 42.1 % (ref 34–46.6)
HGB BLD-MCNC: 14 G/DL (ref 12–15.9)
LYMPHOCYTES # BLD AUTO: 1.46 10*3/MM3 (ref 0.7–3.1)
LYMPHOCYTES NFR BLD AUTO: 18.9 % (ref 19.6–45.3)
MCH RBC QN AUTO: 29.7 PG (ref 26.6–33)
MCHC RBC AUTO-ENTMCNC: 33.3 G/DL (ref 31.5–35.7)
MCV RBC AUTO: 89.4 FL (ref 79–97)
MONOCYTES # BLD AUTO: 0.67 10*3/MM3 (ref 0.1–0.9)
MONOCYTES NFR BLD AUTO: 8.7 % (ref 5–12)
NEUTROPHILS # BLD AUTO: 5.38 10*3/MM3 (ref 1.7–7)
NEUTROPHILS NFR BLD AUTO: 69.4 % (ref 42.7–76)
PLATELET # BLD AUTO: 236 10*3/MM3 (ref 140–450)
PMV BLD AUTO: 11.1 FL (ref 6–12)
POTASSIUM BLD-SCNC: 4.3 MMOL/L (ref 3.5–5.2)
PROT SERPL-MCNC: 6.9 G/DL (ref 6–8.5)
RBC # BLD AUTO: 4.71 10*6/MM3 (ref 3.77–5.28)
SODIUM BLD-SCNC: 140 MMOL/L (ref 136–145)
URATE SERPL-MCNC: 4.7 MG/DL (ref 2.4–5.7)
WBC NRBC COR # BLD: 7.74 10*3/MM3 (ref 3.4–10.8)

## 2020-05-19 PROCEDURE — 84550 ASSAY OF BLOOD/URIC ACID: CPT

## 2020-05-19 PROCEDURE — 73610 X-RAY EXAM OF ANKLE: CPT

## 2020-05-19 PROCEDURE — 36415 COLL VENOUS BLD VENIPUNCTURE: CPT

## 2020-05-19 PROCEDURE — 85027 COMPLETE CBC AUTOMATED: CPT

## 2020-05-19 PROCEDURE — 77063 BREAST TOMOSYNTHESIS BI: CPT

## 2020-05-19 PROCEDURE — 80053 COMPREHEN METABOLIC PANEL: CPT

## 2020-05-19 PROCEDURE — 86140 C-REACTIVE PROTEIN: CPT

## 2020-05-19 PROCEDURE — 77067 SCR MAMMO BI INCL CAD: CPT

## 2020-05-19 PROCEDURE — 85652 RBC SED RATE AUTOMATED: CPT

## 2020-05-19 NOTE — TELEPHONE ENCOUNTER
Patient called today and stated she had a telehealth visit 2 weeks ago w/Neda at [facility], no download could be found in system. Printed off download and called [facility] to get notes from her last visit. Told patient Dr. Ortiz would be her next Tuesday and I would put everything in his box-AK

## 2020-06-18 ENCOUNTER — TELEPHONE (OUTPATIENT)
Dept: SLEEP MEDICINE | Facility: HOSPITAL | Age: 65
End: 2020-06-18

## 2020-06-18 NOTE — TELEPHONE ENCOUNTER
Patient called in today and stated she had not heard anything about her DOT form she was supposed to get with her last telehealth with [facility]. She has not heard or received anything, Printed out her download and faxed to Anusha at [place]. Explained to Anusha the situation and she stated Neda was there in [place] today and she would have her fill it out-AK

## 2020-10-27 ENCOUNTER — APPOINTMENT (OUTPATIENT)
Dept: SLEEP MEDICINE | Facility: HOSPITAL | Age: 65
End: 2020-10-27

## 2021-01-19 ENCOUNTER — OFFICE VISIT (OUTPATIENT)
Dept: CARDIOLOGY | Facility: CLINIC | Age: 66
End: 2021-01-19

## 2021-01-19 VITALS
OXYGEN SATURATION: 99 % | WEIGHT: 262 LBS | BODY MASS INDEX: 43.65 KG/M2 | HEIGHT: 65 IN | DIASTOLIC BLOOD PRESSURE: 82 MMHG | HEART RATE: 58 BPM | SYSTOLIC BLOOD PRESSURE: 122 MMHG

## 2021-01-19 DIAGNOSIS — G47.33 OSA (OBSTRUCTIVE SLEEP APNEA): Primary | ICD-10-CM

## 2021-01-19 DIAGNOSIS — E66.9 DIABETES MELLITUS TYPE 2 IN OBESE (HCC): ICD-10-CM

## 2021-01-19 DIAGNOSIS — E11.69 DIABETES MELLITUS TYPE 2 IN OBESE (HCC): ICD-10-CM

## 2021-01-19 DIAGNOSIS — R60.0 BILATERAL LEG EDEMA: ICD-10-CM

## 2021-01-19 DIAGNOSIS — I10 ESSENTIAL HYPERTENSION: ICD-10-CM

## 2021-01-19 PROCEDURE — 93000 ELECTROCARDIOGRAM COMPLETE: CPT | Performed by: INTERNAL MEDICINE

## 2021-01-19 PROCEDURE — 99204 OFFICE O/P NEW MOD 45 MIN: CPT | Performed by: INTERNAL MEDICINE

## 2021-01-19 RX ORDER — MULTIPLE VITAMINS W/ MINERALS TAB 9MG-400MCG
1 TAB ORAL DAILY
COMMUNITY

## 2021-01-19 RX ORDER — UREA 10 %
27 LOTION (ML) TOPICAL 2 TIMES DAILY
COMMUNITY
End: 2021-05-14

## 2021-01-19 RX ORDER — PRAMIPEXOLE DIHYDROCHLORIDE 0.5 MG/1
0.5 TABLET ORAL 3 TIMES DAILY
COMMUNITY
End: 2021-05-25 | Stop reason: SDUPTHER

## 2021-01-19 RX ORDER — POTASSIUM CHLORIDE 20 MEQ/1
20 TABLET, EXTENDED RELEASE ORAL 2 TIMES DAILY
COMMUNITY

## 2021-01-19 RX ORDER — FUROSEMIDE 40 MG/1
40 TABLET ORAL DAILY
COMMUNITY
End: 2022-06-01

## 2021-01-19 NOTE — PROGRESS NOTES
PATIENTINFORMATION    Date of Office Visit: 21  Encounter Provider: Anita Billingsley MD  Place of Service: [facility]  Patient Name: Angelina Narayan  : 1955    Subjective:         Patient ID: Angelina Narayan is a 65 y.o. female.      History of Present Illness    This is a lady in .  At that time she had an echocardiogram which showed normal LV function, grade 1 diastolic dysfunction no significant valve disease.  She had a nuclear stress test in 2015 which was normal.  She wore Holter monitor in 2015 which was normal.  She had a right and left heart catheterization in 2015 which was normal.  She was diagnosed with severe sleep apnea and started on CPAP back in .    She has been doing okay.  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  She is short of breath if she is carrying things and going up stairs.  She was having some severe lower extremity edema and including weeping of the left leg.  She is not had any testing done on this but her primary started her on some Lasix which she can take daily.    Review of Systems   Constitution: Negative for fever, malaise/fatigue, weight gain and weight loss.   HENT: Negative for ear pain, hearing loss, nosebleeds and sore throat.    Eyes: Negative for double vision, pain, vision loss in left eye and vision loss in right eye.   Cardiovascular:        See history of present illness.   Respiratory: Positive for shortness of breath. Negative for cough, sleep disturbances due to breathing, snoring and wheezing.    Endocrine: Negative for cold intolerance, heat intolerance and polyuria.   Skin: Positive for itching and rash. Negative for poor wound healing.   Musculoskeletal: Positive for joint pain and joint swelling. Negative for myalgias.   Gastrointestinal: Negative for abdominal pain, diarrhea, hematochezia, nausea and vomiting.  "  Genitourinary: Negative for hematuria and hesitancy.   Neurological: Positive for headaches. Negative for numbness, paresthesias and seizures.   Psychiatric/Behavioral: Negative for depression. The patient is not nervous/anxious.            ECG 12 Lead    Date/Time: 1/19/2021 12:57 PM  Performed by: Anita Billingsley MD  Authorized by: Anita Billingsley MD   Comparison: compared with previous ECG from 4/24/2017  Similar to previous ECG  Rhythm: sinus rhythm  BPM: 64  Conduction: conduction normal  ST Segments: ST segments normal  T Waves: T waves normal    Clinical impression: normal ECG               Objective:     /82 (BP Location: Left arm)   Pulse 58   Ht 165.1 cm (65\")   Wt 119 kg (262 lb)   SpO2 99%   BMI 43.60 kg/m²  Body mass index is 43.6 kg/m².     Vitals signs reviewed.   Constitutional:       Appearance: Normal appearance. Well-developed.   Eyes:      General: Lids are normal. Lids are everted, no foreign bodies appreciated.      Conjunctiva/sclera: Conjunctivae normal.      Pupils: Pupils are equal, round, and reactive to light.   HENT:      Head: Normocephalic and atraumatic.   Neck:      Musculoskeletal: Normal range of motion.      Thyroid: No thyroid mass.      Vascular: No carotid bruit or JVD.      Trachea: No tracheal deviation.   Pulmonary:      Effort: Pulmonary effort is normal.      Breath sounds: Normal breath sounds.   Cardiovascular:      Normal rate. Regular rhythm.   Pulses:     Dorsalis pedis: 2+ bilaterally.  Abdominal:      General: Bowel sounds are normal.   Musculoskeletal: Normal range of motion.   Skin:     General: Skin is warm and dry.   Neurological:      Mental Status: Alert.   Psychiatric:         Behavior: Behavior normal.             Assessment/Plan:       1.  Edema.  I am going to check an echocardiogram and a lower extremity Doppler.  She request this be done in [place].  2.  Obstructive sleep apnea  3.  Hypothyroid.  4.  Dyspnea on exertion.  I Yaneth check " a nuclear stress test.  She had a normal heart catheterization a few years ago.    Myself or one of my nurses will go over the results of her echo, stress test and lower extremity Doppler when they are available.    Orders Placed This Encounter   Procedures   • Stress Test With Myocardial Perfusion One Day     Standing Status:   Future     Standing Expiration Date:   1/19/2022     Order Specific Question:   What stress agent will be used?     Answer:   Regadenoson (Lexiscan)     Comments:   low level exercise     Order Specific Question:   Difficulty walking criteria?     Answer:   Musculoskeletal (hips, knees, feet, back, amputee)     Order Specific Question:   Reason for exam?     Answer:   Angina   • ECG 12 Lead     This order was created via procedure documentation   • Adult Transthoracic Echo Complete W/ Cont if Necessary Per Protocol     Standing Status:   Future     Standing Expiration Date:   1/19/2022     Order Specific Question:   Reason for exam?     Answer:   Heart Failure, Cardiomyopathy, or Sytemic or Pulmonary Hypertension        Discharge Medications          Accurate as of January 19, 2021  3:32 PM. If you have any questions, ask your nurse or doctor.            Continue These Medications      Instructions Start Date   (No Medication Selected)   1,400 mg, Oral, Tumeric      cetirizine 10 MG tablet  Commonly known as: zyrTEC   10 mg, Oral, As Needed      furosemide 40 MG tablet  Commonly known as: LASIX   40 mg, Oral, 2 Times Daily      hydroxychloroquine 200 MG tablet  Commonly known as: PLAQUENIL   200 mg, Oral, Daily      levothyroxine 50 MCG tablet  Commonly known as: SYNTHROID, LEVOTHROID   levothyroxine 50 mcg tablet      magnesium (as) gluconate 500 (27 Mg) MG tablet  Commonly known as: MAGONATE   27 mg, Oral, 2 Times Daily      Multivitamin Adult tablet tablet   1 tablet, Oral, Daily      potassium chloride 20 MEQ CR tablet  Commonly known as: K-DUR,KLOR-CON   20 mEq, Oral, 2 Times Daily       pramipexole 0.5 MG tablet  Commonly known as: MIRAPEX   0.5 mg, Oral, 3 Times Daily      pramipexole 0.5 MG tablet  Commonly known as: MIRAPEX   0.5 mg, Oral, Nightly      TRAZODONE HCL PO   Oral      vitamin D3 125 MCG (5000 UT) capsule capsule   5,000 Units, Oral, Daily         Stop These Medications    HYDROcodone-acetaminophen 5-325 MG per tablet  Commonly known as: NORCO  Stopped by: Anita Billingsley MD     Nascobal 500 MCG/0.1ML solution  Generic drug: Cyanocobalamin  Stopped by: MD Anita Denny MD  01/19/21  15:32 EST

## 2021-03-05 ENCOUNTER — APPOINTMENT (OUTPATIENT)
Dept: CARDIOLOGY | Facility: HOSPITAL | Age: 66
End: 2021-03-05

## 2021-03-05 ENCOUNTER — APPOINTMENT (OUTPATIENT)
Dept: NUCLEAR MEDICINE | Facility: HOSPITAL | Age: 66
End: 2021-03-05

## 2021-04-27 ENCOUNTER — OFFICE VISIT (OUTPATIENT)
Dept: SLEEP MEDICINE | Facility: HOSPITAL | Age: 66
End: 2021-04-27

## 2021-04-27 VITALS
WEIGHT: 264 LBS | DIASTOLIC BLOOD PRESSURE: 82 MMHG | HEART RATE: 71 BPM | BODY MASS INDEX: 43.99 KG/M2 | SYSTOLIC BLOOD PRESSURE: 136 MMHG | HEIGHT: 65 IN

## 2021-04-27 DIAGNOSIS — G25.81 RESTLESS LEGS SYNDROME (RLS): ICD-10-CM

## 2021-04-27 DIAGNOSIS — E66.01 OBESITY, MORBID, BMI 40.0-49.9 (HCC): ICD-10-CM

## 2021-04-27 DIAGNOSIS — G47.33 OBSTRUCTIVE SLEEP APNEA: Primary | ICD-10-CM

## 2021-04-27 DIAGNOSIS — G47.9 RESTLESS SLEEPER: ICD-10-CM

## 2021-04-27 PROCEDURE — G0463 HOSPITAL OUTPT CLINIC VISIT: HCPCS

## 2021-04-27 NOTE — PROGRESS NOTES
[facility] SLEEP MEDICINE  [address]  3RD FLOOR  [facility] [address]  [phone]    PCP: Lesly Anthony, NP-C    Reason for visit:  Sleep disorders: RIVERA, RLS    Angelina is a 65 y.o.female who was seen in the [facility] today. Annual fu. She continues to have restless sleep. She sleeps from 9:30pm to 4am. She does not have arthritis pains. She is having swelling in lower legs. No RLS with pramipexole. She had her thyroid checked. She takes trazodone nightly 50 mg. Using nasal mask and changed it 6 months ago and then recently. She finds the CPAP hugely beneficial. She also uses a bite-guard for bruxism.  [place] Sleepiness Scale is 10. Caffeine 5 per day. Alcohol 0 per week.    Angelina  reports that she has never smoked. She has never used smokeless tobacco.    Pertinent Positive Review of Systems of pnd  Rest of Review of Systems was negative as recorded in Sleep Questionnaire.    Patient  has a past medical history of Anemia, DDD (degenerative disc disease), lumbar, Diabetes mellitus (CMS/HCC), Disease of thyroid gland, Fatigue, GERD (gastroesophageal reflux disease), Heartburn, Hiatal hernia, Hypertension, Joint pain, RIVERA (obstructive sleep apnea), and RLS (restless legs syndrome).     Current Medications:    Current Outpatient Medications:   •  (No Medication Selected), Take 1,400 mg by mouth. Tumeric , Disp: , Rfl:   •  cetirizine (zyrTEC) 10 MG tablet, Take 10 mg by mouth As Needed., Disp: , Rfl:   •  furosemide (LASIX) 40 MG tablet, Take 40 mg by mouth 2 (Two) Times a Day., Disp: , Rfl:   •  hydroxychloroquine (PLAQUENIL) 200 MG tablet, Take 200 mg by mouth Daily., Disp: , Rfl:   •  levothyroxine (SYNTHROID, LEVOTHROID) 50 MCG tablet, levothyroxine 50 mcg tablet, Disp: , Rfl:   •  magnesium, as, gluconate (MAGONATE) 500 (27 Mg) MG tablet, Take 27 mg by mouth 2 (Two) Times a Day., Disp: , Rfl:   •  multivitamin with minerals (Multivitamin Adult) tablet tablet, Take 1 tablet by mouth  "Daily., Disp: , Rfl:   •  potassium chloride (K-DUR,KLOR-CON) 20 MEQ CR tablet, Take 20 mEq by mouth 2 (Two) Times a Day., Disp: , Rfl:   •  pramipexole (MIRAPEX) 0.5 MG tablet, Take 1 tablet by mouth Every Night., Disp: 90 tablet, Rfl: 3  •  pramipexole (MIRAPEX) 0.5 MG tablet, Take 0.5 mg by mouth 3 (Three) Times a Day., Disp: , Rfl:   •  TRAZODONE HCL PO, Take  by mouth., Disp: , Rfl:   •  vitamin D3 125 MCG (5000 UT) capsule capsule, Take 5,000 Units by mouth Daily., Disp: , Rfl:    also entered in Sleep Questionnaire         Vital Signs: /82   Pulse 71   Ht 165.1 cm (65\")   Wt 120 kg (264 lb)   BMI 43.93 kg/m²     Body mass index is 43.93 kg/m².       Tongue: Normal       Dentition: good       Pharynx: Posterior pharyngeal pillars are narrow   Mallampatti: III (soft and hard palate and base of uvula visible)        General: Alert. Cooperative. Well developed. No acute distress.             Head:  Normocephalic. Symmetrical. Atraumatic.              Nose: No septal deviation. No drainage.          Throat: No oral lesions. No thrush. Moist mucous membranes.    Chest Wall:  Normal shape. Symmetric expansion with respiration. No tenderness.             Neck:  Trachea midline.           Lungs:  Clear to auscultation bilaterally. No wheezes. No rhonchi. No rales. Respirations regular, even and unlabored.            Heart:  Regular rhythm and normal rate. Normal S1 and S2. No murmur.     Abdomen:  Soft, non-tender and non-distended. Normal bowel sounds. No masses.  Extremities:  Moves all extremities well. No edema.    Psychiatric: Normal mood and affect.    Diagnostic data available to date is as below and was reviewed on current visit:  · Split 3/20/15   Diagnostic study 10:40 p.m. to 2:02 a.m., sleep efficiency was poor  at 50% with 1.69 hours total sleep time. Sleep distribution was abnormal with  absence of REM sleep; however, the study was adequate for diagnostic purposes.  The apnea hypopnea index was " very, very severe at 66 events an hour. Due to  absence of supine and REM sleep, severity may be underestimated. Oxygen  saturation fell below 90% for 12% of total sleep time. Arousal index was 61.  The patient had 63% time snoring.      Following above, CPAP titration study from 2:02 a.m. to 6:34 a.m., sleep  efficiency was improved to 70%. Rebound in REM sleep to 35%. Apnea hypopnea,  orthopnea index was decreased to less than 5. Oxygen saturation during the  night improved. Arousal index improved. Periodic limb movement index improved.  Snoring index improved. CPAP was increased up to 10 cm water pressure. At 9 cm  water pressure, AHI was less than 5 and no significant desaturations. The  patient achieved supine and REM sleep.     · 5/4/18  The patient tolerated the home sleep testing with monitoring time of 415 minutes. The data obtained make this a technically adequate study. The apnea hypopneas index(AHI) was 24.5 per sleep hour.  The AHI during supine position was 26.8 per sleep hour. Mean heart rate of 60.3 BPM.  Snoring was noted 48.9% of sleep time. Lowest oxygen saturation during the study was 72%. Saturation below 89% was noted for 109 mins.      · 5/24/18  Overnight titration study from 9:21 PM to 4:06 AM.  Sleep efficiency 96%.  Normal sleep distribution.  AHI 0.3.  Adequate supine sleep and REM sleep.  Oxygen saturation remained above 88%.  No snoring.  CPAP increased from 5-9 cm water pressure.  Maximum sleep at 6 cm water pressure with maximum REM sleep at 8 cm water pressure.  Supine sleep only on the higher pressure settings.    Most current available usage data reviewed on 04/27/2021:  · 100% compliance average 6 hours 56 minutes AHI 2.6 set pressure 9 cm    DME Company: [employer]    Impression:  1. Obstructive sleep apnea    2. Obesity, morbid, BMI 40.0-49.9 (CMS/HCC)    3. Restless legs syndrome (RLS)    4. Restless sleeper        Plan:  Angelina is somewhat sleepy during the day.  I will try cpap of  11cms. Get new device auto 5-15 cms and also MEG on new device.  It is unclear why she still has restlessness at night.    Continue pramipexole as it appears to control RLS.  I again reiterated that she try stopping all caffeine, even though in the past discontinuation of caffeine did not improve restlessness.    This patient is compliant with effective treatment for obstructive sleep apnea with positive airway pressure device and has no residual sleepiness. 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     I reiterated the importance of effective treatment of obstructive sleep apnea with PAP machine.  Cardiovascular health risks of untreated sleep apnea were again reviewed.  Patient was asked to remain cautious if there is persistent hypersomnolence. The benefit of weight loss in reducing severity of obstructive sleep apnea was discussed.  Patient would benefit from adhering to a strict diet to achieve ideal BMI.     Change of PAP supplies regularly is important for effective use.  Change of cushion on the mask or plugs on nasal pillows along with disposable filters once every month and change of mask frame, tubing, headgear and Velcro straps every 6 months at the minimum was reiterated.    Patient will follow up in this clinic in 3 months      Thank you for allowing me to participate in your patient's care.    Electronically signed by Elier Ortiz MD, 04/27/21, 10:17 AM EDT.    Part of this note may be an electronic transcription/translation of spoken language to printed text using the Dragon Dictation System.

## 2021-05-06 ENCOUNTER — LAB (OUTPATIENT)
Dept: LAB | Facility: HOSPITAL | Age: 66
End: 2021-05-06

## 2021-05-06 ENCOUNTER — TRANSCRIBE ORDERS (OUTPATIENT)
Dept: ADMINISTRATIVE | Facility: HOSPITAL | Age: 66
End: 2021-05-06

## 2021-05-06 DIAGNOSIS — E53.8 VITAMIN B 12 DEFICIENCY: ICD-10-CM

## 2021-05-06 DIAGNOSIS — E03.9 HYPOTHYROIDISM, UNSPECIFIED TYPE: ICD-10-CM

## 2021-05-06 DIAGNOSIS — G62.9 NEUROPATHY: ICD-10-CM

## 2021-05-06 DIAGNOSIS — E03.9 HYPOTHYROIDISM, UNSPECIFIED TYPE: Primary | ICD-10-CM

## 2021-05-06 LAB
T3FREE SERPL-MCNC: 2.35 PG/ML (ref 2–4.4)
T4 FREE SERPL-MCNC: 1.33 NG/DL (ref 0.93–1.7)
TSH SERPL DL<=0.05 MIU/L-ACNC: 3.29 UIU/ML (ref 0.27–4.2)
VIT B12 BLD-MCNC: 822 PG/ML (ref 211–946)

## 2021-05-06 PROCEDURE — 84481 FREE ASSAY (FT-3): CPT

## 2021-05-06 PROCEDURE — 82607 VITAMIN B-12: CPT

## 2021-05-06 PROCEDURE — 86376 MICROSOMAL ANTIBODY EACH: CPT

## 2021-05-06 PROCEDURE — 84443 ASSAY THYROID STIM HORMONE: CPT

## 2021-05-06 PROCEDURE — 86800 THYROGLOBULIN ANTIBODY: CPT

## 2021-05-06 PROCEDURE — 84439 ASSAY OF FREE THYROXINE: CPT

## 2021-05-06 PROCEDURE — 36415 COLL VENOUS BLD VENIPUNCTURE: CPT

## 2021-05-06 PROCEDURE — 83921 ORGANIC ACID SINGLE QUANT: CPT

## 2021-05-08 LAB — THYROPEROXIDASE AB SERPL-ACNC: 23 IU/ML (ref 0–34)

## 2021-05-10 LAB — THYROGLOB AB SERPL-ACNC: <1 IU/ML (ref 0–0.9)

## 2021-05-14 ENCOUNTER — OFFICE VISIT (OUTPATIENT)
Dept: BARIATRICS/WEIGHT MGMT | Facility: CLINIC | Age: 66
End: 2021-05-14

## 2021-05-14 ENCOUNTER — LAB (OUTPATIENT)
Dept: LAB | Facility: HOSPITAL | Age: 66
End: 2021-05-14

## 2021-05-14 VITALS
DIASTOLIC BLOOD PRESSURE: 82 MMHG | TEMPERATURE: 97.5 F | HEIGHT: 65 IN | SYSTOLIC BLOOD PRESSURE: 149 MMHG | RESPIRATION RATE: 18 BRPM | WEIGHT: 267 LBS | BODY MASS INDEX: 44.48 KG/M2 | HEART RATE: 75 BPM

## 2021-05-14 DIAGNOSIS — E66.9 DIABETES MELLITUS TYPE 2 IN OBESE (HCC): ICD-10-CM

## 2021-05-14 DIAGNOSIS — G47.33 OSA (OBSTRUCTIVE SLEEP APNEA): ICD-10-CM

## 2021-05-14 DIAGNOSIS — E11.69 DIABETES MELLITUS TYPE 2 IN OBESE (HCC): ICD-10-CM

## 2021-05-14 DIAGNOSIS — R53.82 CHRONIC FATIGUE: ICD-10-CM

## 2021-05-14 DIAGNOSIS — I10 ESSENTIAL HYPERTENSION: ICD-10-CM

## 2021-05-14 DIAGNOSIS — E66.01 OBESITY, CLASS III, BMI 40-49.9 (MORBID OBESITY) (HCC): Primary | ICD-10-CM

## 2021-05-14 DIAGNOSIS — F32.A DEPRESSION, UNSPECIFIED DEPRESSION TYPE: ICD-10-CM

## 2021-05-14 DIAGNOSIS — K21.9 GASTROESOPHAGEAL REFLUX DISEASE, UNSPECIFIED WHETHER ESOPHAGITIS PRESENT: ICD-10-CM

## 2021-05-14 DIAGNOSIS — E66.01 OBESITY, CLASS III, BMI 40-49.9 (MORBID OBESITY) (HCC): ICD-10-CM

## 2021-05-14 PROBLEM — E66.812 OBESITY, CLASS II, BMI 35-39.9: Status: RESOLVED | Noted: 2018-06-29 | Resolved: 2021-05-14

## 2021-05-14 PROBLEM — E66.813 OBESITY, CLASS III, BMI 40-49.9 (MORBID OBESITY): Status: ACTIVE | Noted: 2021-05-14

## 2021-05-14 LAB
25(OH)D3 SERPL-MCNC: 44 NG/ML (ref 30–100)
ALBUMIN SERPL-MCNC: 4.2 G/DL (ref 3.5–5.2)
ALBUMIN/GLOB SERPL: 1.7 G/DL
ALP SERPL-CCNC: 80 U/L (ref 39–117)
ALT SERPL W P-5'-P-CCNC: 14 U/L (ref 1–33)
ANION GAP SERPL CALCULATED.3IONS-SCNC: 10 MMOL/L (ref 5–15)
AST SERPL-CCNC: 12 U/L (ref 1–32)
BASOPHILS # BLD AUTO: 0.06 10*3/MM3 (ref 0–0.2)
BASOPHILS NFR BLD AUTO: 0.6 % (ref 0–1.5)
BILIRUB SERPL-MCNC: 0.5 MG/DL (ref 0–1.2)
BUN SERPL-MCNC: 14 MG/DL (ref 8–23)
BUN/CREAT SERPL: 22.2 (ref 7–25)
CALCIUM SPEC-SCNC: 9.4 MG/DL (ref 8.6–10.5)
CHLORIDE SERPL-SCNC: 103 MMOL/L (ref 98–107)
CO2 SERPL-SCNC: 26 MMOL/L (ref 22–29)
CREAT SERPL-MCNC: 0.63 MG/DL (ref 0.57–1)
DEPRECATED RDW RBC AUTO: 44.5 FL (ref 37–54)
EOSINOPHIL # BLD AUTO: 0.17 10*3/MM3 (ref 0–0.4)
EOSINOPHIL NFR BLD AUTO: 1.8 % (ref 0.3–6.2)
ERYTHROCYTE [DISTWIDTH] IN BLOOD BY AUTOMATED COUNT: 13.1 % (ref 12.3–15.4)
FERRITIN SERPL-MCNC: 65.5 NG/ML (ref 13–150)
FOLATE SERPL-MCNC: 13.6 NG/ML (ref 4.78–24.2)
GFR SERPL CREATININE-BSD FRML MDRD: 95 ML/MIN/1.73
GLOBULIN UR ELPH-MCNC: 2.5 GM/DL
GLUCOSE SERPL-MCNC: 114 MG/DL (ref 65–99)
HCT VFR BLD AUTO: 44.1 % (ref 34–46.6)
HGB BLD-MCNC: 14.4 G/DL (ref 12–15.9)
IMM GRANULOCYTES # BLD AUTO: 0.03 10*3/MM3 (ref 0–0.05)
IMM GRANULOCYTES NFR BLD AUTO: 0.3 % (ref 0–0.5)
IRON 24H UR-MRATE: 49 MCG/DL (ref 37–145)
LYMPHOCYTES # BLD AUTO: 2 10*3/MM3 (ref 0.7–3.1)
LYMPHOCYTES NFR BLD AUTO: 21.6 % (ref 19.6–45.3)
Lab: NORMAL
MAGNESIUM SERPL-MCNC: 2 MG/DL (ref 1.6–2.4)
MCH RBC QN AUTO: 30.3 PG (ref 26.6–33)
MCHC RBC AUTO-ENTMCNC: 32.7 G/DL (ref 31.5–35.7)
MCV RBC AUTO: 92.8 FL (ref 79–97)
METHYLMALONATE SERPL-SCNC: 116 NMOL/L (ref 0–378)
MONOCYTES # BLD AUTO: 0.46 10*3/MM3 (ref 0.1–0.9)
MONOCYTES NFR BLD AUTO: 5 % (ref 5–12)
NEUTROPHILS NFR BLD AUTO: 6.56 10*3/MM3 (ref 1.7–7)
NEUTROPHILS NFR BLD AUTO: 70.7 % (ref 42.7–76)
NRBC BLD AUTO-RTO: 0 /100 WBC (ref 0–0.2)
PLATELET # BLD AUTO: 250 10*3/MM3 (ref 140–450)
PMV BLD AUTO: 11.2 FL (ref 6–12)
POTASSIUM SERPL-SCNC: 4.2 MMOL/L (ref 3.5–5.2)
PREALB SERPL-MCNC: 24.3 MG/DL (ref 20–40)
PROT SERPL-MCNC: 6.7 G/DL (ref 6–8.5)
PTH-INTACT SERPL-MCNC: 28.2 PG/ML (ref 15–65)
RBC # BLD AUTO: 4.75 10*6/MM3 (ref 3.77–5.28)
SODIUM SERPL-SCNC: 139 MMOL/L (ref 136–145)
WBC # BLD AUTO: 9.28 10*3/MM3 (ref 3.4–10.8)

## 2021-05-14 PROCEDURE — 82306 VITAMIN D 25 HYDROXY: CPT

## 2021-05-14 PROCEDURE — 84134 ASSAY OF PREALBUMIN: CPT

## 2021-05-14 PROCEDURE — 83540 ASSAY OF IRON: CPT

## 2021-05-14 PROCEDURE — 82728 ASSAY OF FERRITIN: CPT

## 2021-05-14 PROCEDURE — 36415 COLL VENOUS BLD VENIPUNCTURE: CPT

## 2021-05-14 PROCEDURE — 99215 OFFICE O/P EST HI 40 MIN: CPT | Performed by: NURSE PRACTITIONER

## 2021-05-14 PROCEDURE — 82746 ASSAY OF FOLIC ACID SERUM: CPT

## 2021-05-14 PROCEDURE — 83735 ASSAY OF MAGNESIUM: CPT

## 2021-05-14 PROCEDURE — 80053 COMPREHEN METABOLIC PANEL: CPT

## 2021-05-14 PROCEDURE — 84425 ASSAY OF VITAMIN B-1: CPT

## 2021-05-14 PROCEDURE — 85025 COMPLETE CBC W/AUTO DIFF WBC: CPT

## 2021-05-14 PROCEDURE — 83970 ASSAY OF PARATHORMONE: CPT

## 2021-05-14 RX ORDER — HYDROCHLOROTHIAZIDE 25 MG/1
25 TABLET ORAL DAILY
COMMUNITY
Start: 2020-11-11 | End: 2021-05-14

## 2021-05-14 RX ORDER — TERBINAFINE HYDROCHLORIDE 250 MG/1
250 TABLET ORAL DAILY
COMMUNITY
Start: 2021-04-28 | End: 2022-06-01

## 2021-05-14 RX ORDER — LEVOTHYROXINE SODIUM 0.07 MG/1
75 TABLET ORAL DAILY
COMMUNITY

## 2021-05-14 RX ORDER — CYANOCOBALAMIN 500 UG/1
1 SPRAY NASAL DAILY
COMMUNITY
End: 2021-05-14

## 2021-05-14 NOTE — PROGRESS NOTES
[facility] BARIATRIC [facility] BARIATRIC SURGERY  [address] [facility] [address]  [facility] [address]  [phone]  [address]  [facility] [address]  [phone]  Dept: [phone]  5/14/2021      Angelina Narayan.  [id number]  [id number]  1955  female      Chief Complaint   Patient presents with   • Follow-up     4 YEAR SLEEVE FOLLOW UP       BH Post-Op Bariatric Surgery:   Angelina Narayan is status post Laparoscopic Sleeve/HH procedure, performed on 6/12/2017.    HPI:   Today's weight is 121 kg (267 lb) pounds, today's BMI is Body mass index is 44.43 kg/m².,@ has a  gain of 43 pounds since the last visit and@ weight loss since surgery is 5 pounds. The patient reports a decreased portion size and loss of appetite.      Angelina Narayan denies nausea, vomiting, and reports that she is doing well.     Diet and Exercise: Diet history reviewed and discussed with the patient. Weight loss/gains to date discussed with the patient. The patient states they are eating 60 grams of protein per day. She reports eating 2 meals per day, a typical portion size of 1/2 cup, eating 2 snacks per day, drinking 5-6 or more 8-oz. glasses of water per day, no carbonated beverage consumption. Denies nausea and vomiting    She gets up at 4am. She will have a protein shake (1/2 with her premier) and half of a cup of coffee. She gets home around 8-830 and has two pieces of cinnamon toast. She will have a coke midday or maybe have some candy. May have a pbj before she goes in at 2:30pm. She will get carry out or will have a meat or a veggie for dinner. She can't drink a whole coke in a day.     Supplements: centrum OTC MTV, OTC vitamin D (5000IU D3)    Review of Systems   Constitutional: Positive for appetite change. Negative for fatigue and unexpected weight change.   HENT: Negative.    Eyes: Negative.    Respiratory: Negative.    Cardiovascular: Positive for leg swelling (bilateral, dependent edema).    Gastrointestinal: Negative for abdominal distention, abdominal pain, constipation, diarrhea, nausea and vomiting.   Genitourinary: Negative for difficulty urinating, frequency and urgency.   Musculoskeletal: Positive for myalgias (knee pain (osteoarthritis)). Negative for back pain.   Skin: Negative.    Psychiatric/Behavioral: Negative.    All other systems reviewed and are negative.      Patient Active Problem List   Diagnosis   • RIVERA (obstructive sleep apnea)   • Diabetes mellitus type 2 in obese (CMS/[facility])   • Chronic fatigue   • Edema   • History of gastric ulcer   • Multiple joint pain   • Depression   • Hypothyroidism   • Gastric polyps   • Dietary counseling   • Encopresis   • Gastroesophageal reflux disease   • Rheumatoid arthritis (CMS/[facility])   • Essential hypertension   • Unintended weight gain   • Arthritis of knee   • Dysmorphism   • Obesity, Class III, BMI 40-49.9 (morbid obesity) (CMS/[facility])       Past Medical History:   Diagnosis Date   • Anemia    • DDD (degenerative disc disease), lumbar    • Diabetes mellitus (CMS/[facility])    • Disease of thyroid gland     HYPOTHYROID   • Fatigue    • GERD (gastroesophageal reflux disease)    • Heartburn    • Hiatal hernia    • Hypertension    • Joint pain    • RIVERA (obstructive sleep apnea)     CPAP   • RLS (restless legs syndrome)        The following portions of the patient's history were reviewed and updated as appropriate: allergies, current medications, past family history, past medical history, past social history, past surgical history and problem list.    Vitals:    05/14/21 1011   BP: 149/82   Pulse: 75   Resp: 18   Temp: 97.5 °F (36.4 °C)       Physical Exam  Vitals and nursing note reviewed.   Constitutional:       Appearance: She is well-developed. She is obese.   Neck:      Thyroid: No thyromegaly.   Cardiovascular:      Rate and Rhythm: Normal rate and regular rhythm.      Heart sounds: Normal heart sounds.   Pulmonary:      Effort: Pulmonary effort is  normal. No respiratory distress.      Breath sounds: Normal breath sounds. No wheezing.   Abdominal:      General: Bowel sounds are normal. There is no distension.      Palpations: Abdomen is soft.      Tenderness: There is no abdominal tenderness. There is no guarding.      Hernia: No hernia is present.   Musculoskeletal:         General: No tenderness.      Right lower leg: Edema present.      Left lower leg: Edema present.   Skin:     General: Skin is warm and dry.      Findings: No erythema or rash.   Neurological:      Mental Status: She is alert.   Psychiatric:         Behavior: Behavior normal.      Comments: teary         Assessment:   Post-op, the patient is struggling with weight regain, knee pain, negative self talk    Encounter Diagnoses   Name Primary?   • Obesity, Class III, BMI 40-49.9 (morbid obesity) (CMS/HCC) Yes   • Diabetes mellitus type 2 in obese (CMS/HCC)    • Essential hypertension    • Gastroesophageal reflux disease, unspecified whether esophagitis present    • Depression, unspecified depression type    • RIVERA (obstructive sleep apnea)    • Chronic fatigue        Plan:   We discussed focusing on getting back to clean eating with lean, whole proteins. She will shoot for a goal of 60-70g of protein daily, followed by cruciferous veggies, and then a small portion of healthy fat if desired. She was encouraged to increase her daily fluid intake once school lets out for the summer and then consider adding a fiber supplement (psyllium) once per day to help with cravings. She reports that she was doing pound pre-covid by was advised against due to increased progression of bilateral osteoarthritis by her ortho team. We discussed potentially trying 9-rounds or a program that provides cardio, strength, rhythm without so many position changes. She will make a follow up with the dietitian, but I did recommend subbing bread out with ezekial break, decreasing the frequency of bread intake, and skipping  altogether unless she has gotten protein first at her meal. She was encouraged to get a more solid, nutrient dense lunch to help increase incretin output between lunch and dinner and help with satiety. She was encouraged to try fairlife shakes as a coffee creamer over half and half. She was given a list of podcasts lead by bariatric specific psychologists, and obesity medicine physicians to help her consider her relationship with food and think about tools to build related to value based decision making and cognitive behavioral therapy. She was advised that these modalities are not therapy, but may help determine if she would be interested in working with someone individually.   Encouraged patient to be sure to get plenty of lean protein per day through small frequent meals all with a protein source.   Activity restrictions: none.   Recommended patient be sure to get at least 70 grams of protein per day by eating small, frequent meals all with high lean protein choices. Be sure to limit/cut back on daily carbohydrate intake. Discussed with the patient the recommended amount of water per day to intake- half of body weight in ounces. Reviewed vitamin requirements. Be sure to do routine exercise, 150 minutes per week minimum, including both cardio and strength training.     Instructions / Recommendations: dietary counseling recommended, recommended a daily protein intake of  grams, vitamin supplement(s) recommended, recommended exercising at least 150 minutes per week, behavior modifications recommended and instructed to call the office for concerns, questions, or problems.     The patient was instructed to follow up in 1 month .     Total time spent during this encounter today was 1 hour

## 2021-05-19 LAB — VIT B1 BLD-SCNC: 171.1 NMOL/L (ref 66.5–200)

## 2021-05-21 ENCOUNTER — TELEPHONE (OUTPATIENT)
Dept: BARIATRICS/WEIGHT MGMT | Facility: CLINIC | Age: 66
End: 2021-05-21

## 2021-05-25 ENCOUNTER — TELEPHONE (OUTPATIENT)
Dept: SLEEP MEDICINE | Facility: HOSPITAL | Age: 66
End: 2021-05-25

## 2021-05-25 RX ORDER — PRAMIPEXOLE DIHYDROCHLORIDE 0.5 MG/1
0.5 TABLET ORAL NIGHTLY
Qty: 90 TABLET | Refills: 3 | Status: SHIPPED | OUTPATIENT
Start: 2021-05-25 | End: 2021-06-30

## 2021-06-01 ENCOUNTER — OFFICE VISIT (OUTPATIENT)
Dept: BARIATRICS/WEIGHT MGMT | Facility: CLINIC | Age: 66
End: 2021-06-01

## 2021-06-01 NOTE — PROGRESS NOTES
Angelina SAMANIEGO Sylvain.  [id number]  [id number]  1955  female    Bariatric Nutrition Assessment Follow-Up    Procedure Performed:  Sleeve  Date of Surgery: 6/12/17    Highest Weight:  285 lbs  Current Weight:  267 lbs  Post Surgery Low Weight: 204 lbs     Stated Problem Areas/Concerns:  Weight gain; off track with healthy eating habits and exercise    Assessment: Patient reports she has fallen into emotional/stress eating habits due to life events and regained weight. She is also experiencing frequent diarrhea which she has not been able to associate with specific foods but she does avoid eating in situations where she may not have access to a bathroom. She works as a  and with school out for summer she feels she will have more time to work on her nutrition and exercise. Diet recall: B: protein shake, coffee; S: toast w/ cinnamon and sugar; L: pbj sandwich, Coke; D: tv dinner or salmon daniel or spaghetti, sometimes skips dinner. Feels hungry sooner after eating meat vs bread/starch. Not logging/tracking currently; has baritastic vonnie. Drinking <64 oz water/d. No regular exercise, has knee pain but is able to do some walking.     Recommendations/Plan:  Discussed starting slowly and making some sustainable changes. Encouraged her to consider counseling for help learning some non-food coping strategies and behavior modification techniques. Reviewed some non-meat protein sources since she doesn't love meat. Strongly recommended scheduling appointment with PCP to rule out medical causes of diarrhea.     Goals:  1. focus on developing more structured meals/snack routine and setting alarms/reminders as needed    2. incorporate eggs, greek yogurt, cottage cheese and beans to meals and snacks  3. track food intake for next 2 weeks to establish baseline and document episodes of diarrhea as well to see if there is any correlation with diet    Follow-Up:  Telephone visit in 2-4 weeks      Electronically signed by:  Maddy  GISELLE Cervantes   12:05 EDT06/01/2021

## 2021-06-04 ENCOUNTER — TRANSCRIBE ORDERS (OUTPATIENT)
Dept: ADMINISTRATIVE | Facility: HOSPITAL | Age: 66
End: 2021-06-04

## 2021-06-04 ENCOUNTER — LAB (OUTPATIENT)
Dept: LAB | Facility: HOSPITAL | Age: 66
End: 2021-06-04

## 2021-06-04 DIAGNOSIS — E03.9 ACQUIRED HYPOTHYROIDISM: Primary | ICD-10-CM

## 2021-06-04 DIAGNOSIS — E03.9 ACQUIRED HYPOTHYROIDISM: ICD-10-CM

## 2021-06-04 LAB
T3FREE SERPL-MCNC: 2.39 PG/ML (ref 2–4.4)
T4 FREE SERPL-MCNC: 1.34 NG/DL (ref 0.93–1.7)
TSH SERPL DL<=0.05 MIU/L-ACNC: 3.22 UIU/ML (ref 0.27–4.2)

## 2021-06-04 PROCEDURE — 36415 COLL VENOUS BLD VENIPUNCTURE: CPT

## 2021-06-04 PROCEDURE — 84481 FREE ASSAY (FT-3): CPT

## 2021-06-04 PROCEDURE — 84443 ASSAY THYROID STIM HORMONE: CPT

## 2021-06-04 PROCEDURE — 84439 ASSAY OF FREE THYROXINE: CPT

## 2021-06-30 ENCOUNTER — TRANSCRIBE ORDERS (OUTPATIENT)
Dept: LAB | Facility: HOSPITAL | Age: 66
End: 2021-06-30

## 2021-06-30 ENCOUNTER — APPOINTMENT (OUTPATIENT)
Dept: CARDIOLOGY | Facility: HOSPITAL | Age: 66
End: 2021-06-30

## 2021-06-30 ENCOUNTER — OFFICE VISIT (OUTPATIENT)
Dept: BARIATRICS/WEIGHT MGMT | Facility: CLINIC | Age: 66
End: 2021-06-30

## 2021-06-30 ENCOUNTER — LAB (OUTPATIENT)
Dept: LAB | Facility: HOSPITAL | Age: 66
End: 2021-06-30

## 2021-06-30 VITALS
WEIGHT: 259 LBS | BODY MASS INDEX: 43.15 KG/M2 | HEIGHT: 65 IN | RESPIRATION RATE: 18 BRPM | TEMPERATURE: 96.9 F | SYSTOLIC BLOOD PRESSURE: 129 MMHG | HEART RATE: 73 BPM | DIASTOLIC BLOOD PRESSURE: 84 MMHG

## 2021-06-30 DIAGNOSIS — E11.69 DIABETES MELLITUS TYPE 2 IN OBESE (HCC): ICD-10-CM

## 2021-06-30 DIAGNOSIS — M05.79 SEROPOSITIVE RHEUMATOID ARTHRITIS OF MULTIPLE SITES (HCC): Primary | ICD-10-CM

## 2021-06-30 DIAGNOSIS — E66.9 DIABETES MELLITUS TYPE 2 IN OBESE (HCC): ICD-10-CM

## 2021-06-30 DIAGNOSIS — E03.9 HYPOTHYROIDISM, UNSPECIFIED TYPE: ICD-10-CM

## 2021-06-30 DIAGNOSIS — M05.79 SEROPOSITIVE RHEUMATOID ARTHRITIS OF MULTIPLE SITES (HCC): ICD-10-CM

## 2021-06-30 DIAGNOSIS — G47.33 OSA (OBSTRUCTIVE SLEEP APNEA): ICD-10-CM

## 2021-06-30 DIAGNOSIS — E66.01 OBESITY, CLASS III, BMI 40-49.9 (MORBID OBESITY) (HCC): Primary | ICD-10-CM

## 2021-06-30 DIAGNOSIS — K21.9 GASTROESOPHAGEAL REFLUX DISEASE, UNSPECIFIED WHETHER ESOPHAGITIS PRESENT: ICD-10-CM

## 2021-06-30 DIAGNOSIS — M06.9 RHEUMATOID ARTHRITIS, INVOLVING UNSPECIFIED SITE, UNSPECIFIED WHETHER RHEUMATOID FACTOR PRESENT (HCC): ICD-10-CM

## 2021-06-30 DIAGNOSIS — I10 ESSENTIAL HYPERTENSION: ICD-10-CM

## 2021-06-30 PROBLEM — R63.5 UNINTENDED WEIGHT GAIN: Status: RESOLVED | Noted: 2018-12-28 | Resolved: 2021-06-30

## 2021-06-30 LAB
ALBUMIN SERPL-MCNC: 4.3 G/DL (ref 3.5–5.2)
ALBUMIN/GLOB SERPL: 1.7 G/DL
ALP SERPL-CCNC: 81 U/L (ref 39–117)
ALT SERPL W P-5'-P-CCNC: 11 U/L (ref 1–33)
ANION GAP SERPL CALCULATED.3IONS-SCNC: 10.9 MMOL/L (ref 5–15)
AST SERPL-CCNC: 13 U/L (ref 1–32)
BASOPHILS # BLD AUTO: 0.07 10*3/MM3 (ref 0–0.2)
BASOPHILS NFR BLD AUTO: 0.9 % (ref 0–1.5)
BILIRUB SERPL-MCNC: 0.5 MG/DL (ref 0–1.2)
BUN SERPL-MCNC: 15 MG/DL (ref 8–23)
BUN/CREAT SERPL: 42.9 (ref 7–25)
CALCIUM SPEC-SCNC: 9.2 MG/DL (ref 8.6–10.5)
CHLORIDE SERPL-SCNC: 100 MMOL/L (ref 98–107)
CO2 SERPL-SCNC: 28.1 MMOL/L (ref 22–29)
CREAT SERPL-MCNC: 0.35 MG/DL (ref 0.57–1)
CRP SERPL-MCNC: 0.57 MG/DL (ref 0–0.5)
DEPRECATED RDW RBC AUTO: 38.1 FL (ref 37–54)
EOSINOPHIL # BLD AUTO: 0.15 10*3/MM3 (ref 0–0.4)
EOSINOPHIL NFR BLD AUTO: 1.9 % (ref 0.3–6.2)
ERYTHROCYTE [DISTWIDTH] IN BLOOD BY AUTOMATED COUNT: 12 % (ref 12.3–15.4)
ERYTHROCYTE [SEDIMENTATION RATE] IN BLOOD: 22 MM/HR (ref 0–30)
GFR SERPL CREATININE-BSD FRML MDRD: >150 ML/MIN/1.73
GLOBULIN UR ELPH-MCNC: 2.5 GM/DL
GLUCOSE SERPL-MCNC: 103 MG/DL (ref 65–99)
HCT VFR BLD AUTO: 42.8 % (ref 34–46.6)
HGB BLD-MCNC: 14.5 G/DL (ref 12–15.9)
IMM GRANULOCYTES # BLD AUTO: 0.04 10*3/MM3 (ref 0–0.05)
IMM GRANULOCYTES NFR BLD AUTO: 0.5 % (ref 0–0.5)
LYMPHOCYTES # BLD AUTO: 1.44 10*3/MM3 (ref 0.7–3.1)
LYMPHOCYTES NFR BLD AUTO: 17.8 % (ref 19.6–45.3)
MCH RBC QN AUTO: 29.9 PG (ref 26.6–33)
MCHC RBC AUTO-ENTMCNC: 33.9 G/DL (ref 31.5–35.7)
MCV RBC AUTO: 88.2 FL (ref 79–97)
MONOCYTES # BLD AUTO: 0.61 10*3/MM3 (ref 0.1–0.9)
MONOCYTES NFR BLD AUTO: 7.5 % (ref 5–12)
NEUTROPHILS NFR BLD AUTO: 5.77 10*3/MM3 (ref 1.7–7)
NEUTROPHILS NFR BLD AUTO: 71.4 % (ref 42.7–76)
NRBC BLD AUTO-RTO: 0 /100 WBC (ref 0–0.2)
PLATELET # BLD AUTO: 266 10*3/MM3 (ref 140–450)
PMV BLD AUTO: 11.1 FL (ref 6–12)
POTASSIUM SERPL-SCNC: 3.6 MMOL/L (ref 3.5–5.2)
PROT SERPL-MCNC: 6.8 G/DL (ref 6–8.5)
RBC # BLD AUTO: 4.85 10*6/MM3 (ref 3.77–5.28)
SODIUM SERPL-SCNC: 139 MMOL/L (ref 136–145)
WBC # BLD AUTO: 8.08 10*3/MM3 (ref 3.4–10.8)

## 2021-06-30 PROCEDURE — 36415 COLL VENOUS BLD VENIPUNCTURE: CPT

## 2021-06-30 PROCEDURE — 85025 COMPLETE CBC W/AUTO DIFF WBC: CPT

## 2021-06-30 PROCEDURE — 80053 COMPREHEN METABOLIC PANEL: CPT

## 2021-06-30 PROCEDURE — 85652 RBC SED RATE AUTOMATED: CPT

## 2021-06-30 PROCEDURE — 99213 OFFICE O/P EST LOW 20 MIN: CPT | Performed by: NURSE PRACTITIONER

## 2021-06-30 PROCEDURE — 86140 C-REACTIVE PROTEIN: CPT

## 2021-06-30 RX ORDER — SEMAGLUTIDE 0.25 MG/.5ML
0.25 INJECTION, SOLUTION SUBCUTANEOUS WEEKLY
Qty: 4 PEN | Refills: 0 | Status: SHIPPED | OUTPATIENT
Start: 2021-06-30 | End: 2022-06-01

## 2021-06-30 RX ORDER — TRAZODONE HYDROCHLORIDE 50 MG/1
50 TABLET ORAL DAILY
COMMUNITY
Start: 2021-06-28

## 2021-06-30 RX ORDER — BUPROPION HYDROCHLORIDE 150 MG/1
150 TABLET ORAL DAILY PRN
COMMUNITY
Start: 2021-06-18

## 2021-06-30 NOTE — PROGRESS NOTES
[facility]IATRIC [facility] BARIATRIC SURGERY  [address] [facility] [address]  [facility] [address]  [phone]  [address] [address] [phone]  Dept: [phone]  6/30/2021      Angelina Narayan.  [id number]  [id number]  1955  female      Chief Complaint   Patient presents with   • Follow-up     4 year sleeve follow up         Post-Op Bariatric Surgery:   Angelina Narayan is status post Laparoscopic Sleeve/HH procedure, performed on 6/12/2017     HPI:   Today's weight is 117 kg (259 lb) pounds, today's BMI is Body mass index is 43.1 kg/m².,@ has a  loss of 8 pounds since the last visit and@ weight loss since surgery is 13 pounds. The patient reports a decreased portion size and loss of appetite.      Angelina Narayan denies nausea, vomiting, and reflux and reports that she is doing well.she reports feeling overwhelmed.      Diet and Exercise: Diet history reviewed and discussed with the patient. Weight loss/gains to date discussed with the patient. The patient states they are eating 30-60 grams of protein per day. She reports eating 3 meals per day, a typical portion size of 1/2 cup, eating 1-2 snacks per day, drinking 5-6 or more 8-oz. glasses of water per day. She is struggling with trying to maintain all of the lifestyles changes she would like to stick with at once.     Supplements:  OTC MTV with iron and calcium    Review of Systems   Constitutional: Positive for appetite change. Negative for fatigue and unexpected weight change.   HENT: Negative.    Eyes: Negative.    Respiratory: Negative.    Cardiovascular: Negative.  Negative for leg swelling.   Gastrointestinal: Negative for abdominal distention, abdominal pain, constipation, diarrhea, nausea and vomiting.   Genitourinary: Negative for difficulty urinating, frequency and urgency.   Musculoskeletal: Negative for back pain.   Skin: Negative.    Psychiatric/Behavioral: Negative.    All other systems reviewed  and are negative.      Patient Active Problem List   Diagnosis   • RIVERA (obstructive sleep apnea)   • Diabetes mellitus type 2 in obese (CMS/HCC)   • Chronic fatigue   • Edema   • History of gastric ulcer   • Multiple joint pain   • Depression   • Hypothyroidism   • Gastric polyps   • Dietary counseling   • Encopresis   • Gastroesophageal reflux disease   • Rheumatoid arthritis (CMS/HCC)   • Essential hypertension   • Arthritis of knee   • Obesity, Class III, BMI 40-49.9 (morbid obesity) (CMS/HCC)       Past Medical History:   Diagnosis Date   • Anemia    • DDD (degenerative disc disease), lumbar    • Diabetes mellitus (CMS/HCC)    • Disease of thyroid gland     HYPOTHYROID   • Fatigue    • GERD (gastroesophageal reflux disease)    • Heartburn    • Hiatal hernia    • Hypertension    • Joint pain    • RIVERA (obstructive sleep apnea)     CPAP   • RLS (restless legs syndrome)        The following portions of the patient's history were reviewed and updated as appropriate: allergies, current medications, past family history, past medical history, past social history, past surgical history and problem list.    Vitals:    06/30/21 1051   BP: 129/84   Pulse: 73   Resp: 18   Temp: 96.9 °F (36.1 °C)       Physical Exam  Vitals and nursing note reviewed.   Constitutional:       Appearance: She is well-developed.   Neck:      Thyroid: No thyromegaly.   Cardiovascular:      Rate and Rhythm: Normal rate and regular rhythm.      Heart sounds: Normal heart sounds.   Pulmonary:      Effort: Pulmonary effort is normal. No respiratory distress.      Breath sounds: Normal breath sounds. No wheezing.   Abdominal:      General: Bowel sounds are normal. There is no distension.      Palpations: Abdomen is soft.      Tenderness: There is no abdominal tenderness. There is no guarding.      Hernia: No hernia is present.   Musculoskeletal:         General: No tenderness.   Skin:     General: Skin is warm and dry.      Findings: No erythema or  rash.   Neurological:      Mental Status: She is alert.   Psychiatric:         Behavior: Behavior normal.         Assessment:   Post-op, the patient is doing well.     Encounter Diagnoses   Name Primary?   • Obesity, Class III, BMI 40-49.9 (morbid obesity) (CMS/[facility]) Yes   • Essential hypertension    • Hypothyroidism, unspecified type    • Gastroesophageal reflux disease, unspecified whether esophagitis present    • Rheumatoid arthritis, involving unspecified site, unspecified whether rheumatoid factor present (CMS/[facility])    • RIVERA (obstructive sleep apnea)        Plan:   Patient will refocus on lean protein intake, increasing her fiber and cruciferous veggie intake as well as water. We did discuss options for weight loss medications. Her blood sugar has been well controlled the last year, and due to dependent edema she has an apt with cardiology for workup soon. We will avoid stimulants and contrave due to wellbutrin use. We discussed GLP-1 medications including saxenda and wegovy and she would like to try wegovy. She denies a history of MEN type 2 or thyroid cancer. We discussed dose escalation, common side effects, how to administer etc.    Encouraged patient to be sure to get plenty of lean protein per day through small frequent meals all with a protein source.   Activity restrictions: none.   Recommended patient be sure to get at least 70 grams of protein per day by eating small, frequent meals all with high lean protein choices. Be sure to limit/cut back on daily carbohydrate intake. Discussed with the patient the recommended amount of water per day to intake- half of body weight in ounces. Reviewed vitamin requirements. Be sure to do routine exercise, 150 minutes per week minimum, including both cardio and strength training.     Instructions / Recommendations: dietary counseling recommended, recommended a daily protein intake of  grams, vitamin supplement(s) recommended, recommended exercising at least 150  minutes per week, behavior modifications recommended and instructed to call the office for concerns, questions, or problems.     The patient was instructed to follow up in 1 months    Total time spent during this encounter today was 25 minutes

## 2021-07-01 ENCOUNTER — TELEPHONE (OUTPATIENT)
Dept: BARIATRICS/WEIGHT MGMT | Facility: CLINIC | Age: 66
End: 2021-07-01

## 2021-07-01 NOTE — TELEPHONE ENCOUNTER
GS performed on 6/12/17 Patient was here in the office yesterday and was prescribed Wegovy. Patient states she went to her RA doctor and they jewel some labs. Patient states she is alarmed by the results. She is asking if you cook look over her labs that were drawn yesterday and if you think she should continue taking Wegovy. Please Advise

## 2021-07-01 NOTE — TELEPHONE ENCOUNTER
Spoke to patient regarding labs and starting weight loss medication (Wegovy) Relayed message from WALT Reardon to patient. Patient gave a verbal understanding with no further questions.

## 2021-07-02 ENCOUNTER — OFFICE VISIT (OUTPATIENT)
Dept: BARIATRICS/WEIGHT MGMT | Facility: CLINIC | Age: 66
End: 2021-07-02

## 2021-07-06 ENCOUNTER — HOSPITAL ENCOUNTER (OUTPATIENT)
Dept: CARDIOLOGY | Facility: HOSPITAL | Age: 66
Discharge: HOME | End: 2021-07-06

## 2021-07-06 ENCOUNTER — TELEPHONE (OUTPATIENT)
Dept: CARDIOLOGY | Facility: CLINIC | Age: 66
End: 2021-07-06

## 2021-07-06 VITALS
BODY MASS INDEX: 43.15 KG/M2 | WEIGHT: 259 LBS | HEIGHT: 65 IN | OXYGEN SATURATION: 98 % | HEART RATE: 74 BPM | DIASTOLIC BLOOD PRESSURE: 90 MMHG | SYSTOLIC BLOOD PRESSURE: 142 MMHG

## 2021-07-06 DIAGNOSIS — E66.9 DIABETES MELLITUS TYPE 2 IN OBESE (HCC): ICD-10-CM

## 2021-07-06 DIAGNOSIS — G47.33 OSA (OBSTRUCTIVE SLEEP APNEA): ICD-10-CM

## 2021-07-06 DIAGNOSIS — I10 ESSENTIAL HYPERTENSION: ICD-10-CM

## 2021-07-06 DIAGNOSIS — E11.69 DIABETES MELLITUS TYPE 2 IN OBESE (HCC): ICD-10-CM

## 2021-07-06 DIAGNOSIS — R60.0 BILATERAL LEG EDEMA: ICD-10-CM

## 2021-07-06 LAB
AORTIC ARCH: 2.5 CM
AORTIC DIMENSIONLESS INDEX: 0.8 (DI)
ASCENDING AORTA: 3 CM
BH CV ECHO MEAS - ACS: 1.9 CM
BH CV ECHO MEAS - AO ARCH DIAM (PROXIMAL TRANS.): 2.5 CM
BH CV ECHO MEAS - AO MAX PG (FULL): 4 MMHG
BH CV ECHO MEAS - AO MAX PG: 10.6 MMHG
BH CV ECHO MEAS - AO MEAN PG (FULL): 2.2 MMHG
BH CV ECHO MEAS - AO MEAN PG: 6 MMHG
BH CV ECHO MEAS - AO ROOT AREA (BSA CORRECTED): 1.4
BH CV ECHO MEAS - AO ROOT AREA: 7.5 CM^2
BH CV ECHO MEAS - AO ROOT DIAM: 3.1 CM
BH CV ECHO MEAS - AO V2 MAX: 162.9 CM/SEC
BH CV ECHO MEAS - AO V2 MEAN: 115.1 CM/SEC
BH CV ECHO MEAS - AO V2 VTI: 37.4 CM
BH CV ECHO MEAS - ASC AORTA: 3 CM
BH CV ECHO MEAS - AVA(I,A): 2.8 CM^2
BH CV ECHO MEAS - AVA(I,D): 2.8 CM^2
BH CV ECHO MEAS - AVA(V,A): 2.6 CM^2
BH CV ECHO MEAS - AVA(V,D): 2.6 CM^2
BH CV ECHO MEAS - BSA(HAYCOCK): 2.4 M^2
BH CV ECHO MEAS - BSA: 2.2 M^2
BH CV ECHO MEAS - BZI_BMI: 43.1 KILOGRAMS/M^2
BH CV ECHO MEAS - BZI_METRIC_HEIGHT: 165.1 CM
BH CV ECHO MEAS - BZI_METRIC_WEIGHT: 117.5 KG
BH CV ECHO MEAS - EDV(CUBED): 90.6 ML
BH CV ECHO MEAS - EDV(MOD-SP2): 85 ML
BH CV ECHO MEAS - EDV(MOD-SP4): 92 ML
BH CV ECHO MEAS - EDV(TEICH): 92 ML
BH CV ECHO MEAS - EF(CUBED): 74.4 %
BH CV ECHO MEAS - EF(MOD-BP): 59.8 %
BH CV ECHO MEAS - EF(MOD-SP2): 60 %
BH CV ECHO MEAS - EF(MOD-SP4): 58.7 %
BH CV ECHO MEAS - EF(TEICH): 66.4 %
BH CV ECHO MEAS - ESV(CUBED): 23.2 ML
BH CV ECHO MEAS - ESV(MOD-SP2): 34 ML
BH CV ECHO MEAS - ESV(MOD-SP4): 38 ML
BH CV ECHO MEAS - ESV(TEICH): 30.9 ML
BH CV ECHO MEAS - FS: 36.5 %
BH CV ECHO MEAS - IVS/LVPW: 0.96
BH CV ECHO MEAS - IVSD: 1.2 CM
BH CV ECHO MEAS - LAT PEAK E' VEL: 11.9 CM/SEC
BH CV ECHO MEAS - LV DIASTOLIC VOL/BSA (35-75): 41.7 ML/M^2
BH CV ECHO MEAS - LV MASS(C)D: 203.6 GRAMS
BH CV ECHO MEAS - LV MASS(C)DI: 92.2 GRAMS/M^2
BH CV ECHO MEAS - LV MAX PG: 6.6 MMHG
BH CV ECHO MEAS - LV MEAN PG: 3.8 MMHG
BH CV ECHO MEAS - LV SYSTOLIC VOL/BSA (12-30): 17.2 ML/M^2
BH CV ECHO MEAS - LV V1 MAX: 128.5 CM/SEC
BH CV ECHO MEAS - LV V1 MEAN: 92.5 CM/SEC
BH CV ECHO MEAS - LV V1 VTI: 31.5 CM
BH CV ECHO MEAS - LVIDD: 4.5 CM
BH CV ECHO MEAS - LVIDS: 2.9 CM
BH CV ECHO MEAS - LVLD AP2: 8.1 CM
BH CV ECHO MEAS - LVLD AP4: 7.7 CM
BH CV ECHO MEAS - LVLS AP2: 7.2 CM
BH CV ECHO MEAS - LVLS AP4: 6.9 CM
BH CV ECHO MEAS - LVOT AREA (M): 3.1 CM^2
BH CV ECHO MEAS - LVOT AREA: 3.3 CM^2
BH CV ECHO MEAS - LVOT DIAM: 2 CM
BH CV ECHO MEAS - LVPWD: 1.2 CM
BH CV ECHO MEAS - MED PEAK E' VEL: 10.8 CM/SEC
BH CV ECHO MEAS - MV A DUR: 0.19 SEC
BH CV ECHO MEAS - MV A MAX VEL: 94.3 CM/SEC
BH CV ECHO MEAS - MV DEC SLOPE: 576.5 CM/SEC^2
BH CV ECHO MEAS - MV DEC TIME: 0.18 SEC
BH CV ECHO MEAS - MV E MAX VEL: 116 CM/SEC
BH CV ECHO MEAS - MV E/A: 1.2
BH CV ECHO MEAS - MV MAX PG: 5.3 MMHG
BH CV ECHO MEAS - MV MEAN PG: 2.2 MMHG
BH CV ECHO MEAS - MV P1/2T MAX VEL: 116.7 CM/SEC
BH CV ECHO MEAS - MV P1/2T: 59.3 MSEC
BH CV ECHO MEAS - MV V2 MAX: 115.4 CM/SEC
BH CV ECHO MEAS - MV V2 MEAN: 69.1 CM/SEC
BH CV ECHO MEAS - MV V2 VTI: 27.2 CM
BH CV ECHO MEAS - MVA P1/2T LCG: 1.9 CM^2
BH CV ECHO MEAS - MVA(P1/2T): 3.7 CM^2
BH CV ECHO MEAS - MVA(VTI): 3.8 CM^2
BH CV ECHO MEAS - PA ACC TIME: 0.08 SEC
BH CV ECHO MEAS - PA MAX PG (FULL): 2 MMHG
BH CV ECHO MEAS - PA MAX PG: 4.8 MMHG
BH CV ECHO MEAS - PA PR(ACCEL): 42.2 MMHG
BH CV ECHO MEAS - PA V2 MAX: 109.9 CM/SEC
BH CV ECHO MEAS - PULM A REVS DUR: 0.16 SEC
BH CV ECHO MEAS - PULM A REVS VEL: 27.9 CM/SEC
BH CV ECHO MEAS - PULM DIAS VEL: 39.8 CM/SEC
BH CV ECHO MEAS - PULM S/D: 1.5
BH CV ECHO MEAS - PULM SYS VEL: 61.3 CM/SEC
BH CV ECHO MEAS - PVA(V,A): 3.9 CM^2
BH CV ECHO MEAS - PVA(V,D): 3.9 CM^2
BH CV ECHO MEAS - QP/QS: 1.1
BH CV ECHO MEAS - RAP SYSTOLE: 3 MMHG
BH CV ECHO MEAS - RV MAX PG: 2.8 MMHG
BH CV ECHO MEAS - RV MEAN PG: 1.4 MMHG
BH CV ECHO MEAS - RV V1 MAX: 84.4 CM/SEC
BH CV ECHO MEAS - RV V1 MEAN: 55.2 CM/SEC
BH CV ECHO MEAS - RV V1 VTI: 21.8 CM
BH CV ECHO MEAS - RVOT AREA: 5 CM^2
BH CV ECHO MEAS - RVOT DIAM: 2.5 CM
BH CV ECHO MEAS - RVSP: 26.9 MMHG
BH CV ECHO MEAS - SI(AO): 126.5 ML/M^2
BH CV ECHO MEAS - SI(CUBED): 30.5 ML/M^2
BH CV ECHO MEAS - SI(LVOT): 46.8 ML/M^2
BH CV ECHO MEAS - SI(MOD-SP2): 23.1 ML/M^2
BH CV ECHO MEAS - SI(MOD-SP4): 24.5 ML/M^2
BH CV ECHO MEAS - SI(TEICH): 27.7 ML/M^2
BH CV ECHO MEAS - SUP REN AO DIAM: 2.9 CM
BH CV ECHO MEAS - SV(AO): 279.2 ML
BH CV ECHO MEAS - SV(CUBED): 67.4 ML
BH CV ECHO MEAS - SV(LVOT): 103.3 ML
BH CV ECHO MEAS - SV(MOD-SP2): 51 ML
BH CV ECHO MEAS - SV(MOD-SP4): 54 ML
BH CV ECHO MEAS - SV(RVOT): 110.3 ML
BH CV ECHO MEAS - SV(TEICH): 61.1 ML
BH CV ECHO MEAS - TAPSE (>1.6): 3.2 CM
BH CV ECHO MEAS - TR MAX VEL: 244.3 CM/SEC
BH CV ECHO MEASUREMENTS AVERAGE E/E' RATIO: 10.22
BH CV NUCLEAR PRIOR STUDY: 2
BH CV REST NUCLEAR ISOTOPE DOSE: 11.5 MCI
BH CV STRESS NUCLEAR ISOTOPE DOSE: 35.2 MCI
BH CV XLRA - RV BASE: 3.1 CM
BH CV XLRA - RV LENGTH: 6.9 CM
BH CV XLRA - RV MID: 2.4 CM
BH CV XLRA - TDI S': 13.8 CM/SEC
LEFT ATRIUM VOLUME INDEX: 25 ML/M2
LV EF 2D ECHO EST: 60 %
LV EF NUC BP: 70 %
MAXIMAL PREDICTED HEART RATE: 157 BPM
MAXIMAL PREDICTED HEART RATE: 157 BPM
SINUS: 2.9 CM
STJ: 2.5 CM
STRESS TARGET HR: 133 BPM
STRESS TARGET HR: 133 BPM

## 2021-07-06 PROCEDURE — A9502 TC99M TETROFOSMIN: HCPCS | Performed by: INTERNAL MEDICINE

## 2021-07-06 PROCEDURE — 25010000002 REGADENOSON 0.4 MG/5ML SOLUTION: Performed by: INTERNAL MEDICINE

## 2021-07-06 PROCEDURE — 78452 HT MUSCLE IMAGE SPECT MULT: CPT

## 2021-07-06 PROCEDURE — 93016 CV STRESS TEST SUPVJ ONLY: CPT | Performed by: INTERNAL MEDICINE

## 2021-07-06 PROCEDURE — 93306 TTE W/DOPPLER COMPLETE: CPT | Performed by: INTERNAL MEDICINE

## 2021-07-06 PROCEDURE — 0 TECHNETIUM TETROFOSMIN KIT: Performed by: INTERNAL MEDICINE

## 2021-07-06 PROCEDURE — 93017 CV STRESS TEST TRACING ONLY: CPT

## 2021-07-06 PROCEDURE — 78452 HT MUSCLE IMAGE SPECT MULT: CPT | Performed by: INTERNAL MEDICINE

## 2021-07-06 PROCEDURE — 93018 CV STRESS TEST I&R ONLY: CPT | Performed by: INTERNAL MEDICINE

## 2021-07-06 PROCEDURE — 25010000002 PERFLUTREN (DEFINITY) 8.476 MG IN SODIUM CHLORIDE (PF) 0.9 % 10 ML INJECTION: Performed by: INTERNAL MEDICINE

## 2021-07-06 PROCEDURE — 93306 TTE W/DOPPLER COMPLETE: CPT

## 2021-07-06 RX ADMIN — TETROFOSMIN 1 DOSE: 1.38 INJECTION, POWDER, LYOPHILIZED, FOR SOLUTION INTRAVENOUS at 08:29

## 2021-07-06 RX ADMIN — PERFLUTREN 2 ML: 6.52 INJECTION, SUSPENSION INTRAVENOUS at 07:40

## 2021-07-06 RX ADMIN — REGADENOSON 0.4 MG: 0.08 INJECTION, SOLUTION INTRAVENOUS at 08:29

## 2021-07-06 RX ADMIN — TETROFOSMIN 1 DOSE: 1.38 INJECTION, POWDER, LYOPHILIZED, FOR SOLUTION INTRAVENOUS at 07:42

## 2021-07-06 NOTE — TELEPHONE ENCOUNTER
Please let her know that both her echo and stress test are all normal.  No results back on the lower extremity Doppler.  Can you confirm with her that this has been scheduled?  Otherwise decrease salt intake, keep feet elevated, increase exercise, weight loss.

## 2021-07-06 NOTE — TELEPHONE ENCOUNTER
"Patient notified of results and verbalized understanding    Re dopplers she had it done at her endocrinologist office, \"Dr ESPAÑA\" she couldn't tell me his full name, but that the text was negative  July Hobson RN  Triage nurse      "

## 2021-07-08 NOTE — PROGRESS NOTES
Angelina SAMANIEGO Sylvain.  [id number]  [id number]  1955  female    Bariatric Nutrition Assessment Follow-Up    Procedure Performed:  Sleeve  Date of Surgery: 6/12/17     Highest Weight:  285 lbs  Current Weight:  259 lbs  Post Surgery Low Weight: 204 lbs     Stated Problem Areas/Concerns:  Follow up from appointment 6/1/21    Assessment: Angelina has had a loss of 8 pounds since our last appointment. She is focusing more on protein and drinking water. Currently up to 60 oz water/d. She has cut out cinnamon toast and decreased Cokes. Otherwise her diet has not changed much. She may do cottage cheese and blueberries for breakfast. She has difficulty coming up with high protein foods that sound good to her, specifically most meats are not appealing. She saw WALT at the end of June and was prescribed Wegovy to help with weight loss. The pharmacy had to order the medication so she has not started it yet. She does feel as though she has made positive changes and is happy about weight loss.    Recommendations/Plan:  Positive feedback given for behavior changes made thus far. Made suggestions for protein food including eggs (boiled, scrambled, baked, egg salad), tuna/chicken salad made with light villela or fat free greek yogurt, beans (black, great northern, mazariegos, refried), cottage cheese, string cheese, greek yogurt, edamame, lentils, soy products, nuts, etc. She admitted there were several she would like to try that she hadn't considered. Also recommended trying a low carb tortilla to make wraps or burritos vs bread. Reviewed bariatric portion plate and encouraged balancing meals and snacks with protein, vegetables, complex carbohydrate and healthy fats. Continue to work on increasing fluid intake. She verbalized agreement and understanding. She would like to continue telephone follow-ups for support and accountability.     Follow-Up:  Telephone visit scheduled in 1 month       Electronically signed by:  Maddy Cervantes RD   16:32  EDT07/02/2021

## 2021-07-20 ENCOUNTER — OFFICE VISIT (OUTPATIENT)
Dept: SLEEP MEDICINE | Facility: HOSPITAL | Age: 66
End: 2021-07-20

## 2021-07-20 VITALS
HEIGHT: 65 IN | HEART RATE: 78 BPM | DIASTOLIC BLOOD PRESSURE: 76 MMHG | BODY MASS INDEX: 43.32 KG/M2 | SYSTOLIC BLOOD PRESSURE: 126 MMHG | WEIGHT: 260 LBS

## 2021-07-20 DIAGNOSIS — G47.33 OBSTRUCTIVE SLEEP APNEA: Primary | ICD-10-CM

## 2021-07-20 DIAGNOSIS — E66.01 OBESITY, MORBID, BMI 40.0-49.9 (HCC): ICD-10-CM

## 2021-07-20 PROCEDURE — G0463 HOSPITAL OUTPT CLINIC VISIT: HCPCS

## 2021-07-20 NOTE — PROGRESS NOTES
[facility] SLEEP MEDIC[facility]  [address]  3RD FLOOR  [facility] [address]  [phone]    PCP: Lesly Anthony, NP-C    Reason for visit:  Sleep disorders: RIVERA    Angelina is a 65 y.o.female who was seen in the Sleep Disorders Center today. She got a new machine and here to review. She is feeling much better with her new machine. She sleeps from 10pm to 7am. Using nasal pillows. She does not like the ramp. She is waking up more rested and less EDS. She is somewhat restless during sleep - feels too warm and then too cold.  Dietrich Sleepiness Scale is 2. Caffeine 2-3 per day. Alcohol 0 per week.    Angelina  reports that she has never smoked. She has never used smokeless tobacco.    Pertinent Positive Review of Systems of morning headache  Rest of Review of Systems was negative as recorded in Sleep Questionnaire.    Patient  has a past medical history of Anemia, DDD (degenerative disc disease), lumbar, Diabetes mellitus (CMS/HCC), Disease of thyroid gland, Fatigue, GERD (gastroesophageal reflux disease), Heartburn, Hiatal hernia, Hypertension, Joint pain, RIVERA (obstructive sleep apnea), and RLS (restless legs syndrome).     Current Medications:    Current Outpatient Medications:   •  (No Medication Selected), Take 1,400 mg by mouth. Tumeric , Disp: , Rfl:   •  buPROPion XL (WELLBUTRIN XL) 150 MG 24 hr tablet, Take 150 mg by mouth Daily As Needed., Disp: , Rfl:   •  cetirizine (zyrTEC) 10 MG tablet, Take 10 mg by mouth As Needed., Disp: , Rfl:   •  furosemide (LASIX) 40 MG tablet, Take 40 mg by mouth Daily., Disp: , Rfl:   •  hydroxychloroquine (PLAQUENIL) 200 MG tablet, Take 200 mg by mouth Daily., Disp: , Rfl:   •  levothyroxine (SYNTHROID, LEVOTHROID) 75 MCG tablet, Take 75 mcg by mouth Daily., Disp: , Rfl:   •  Magnesium Cl-Calcium Carbonate (SLOW-MAG PO), Take 1 tablet by mouth Every Night., Disp: , Rfl:   •  multivitamin with minerals (Multivitamin Adult) tablet tablet, Take 1 tablet by mouth Daily., Disp:  ", Rfl:   •  potassium chloride (K-DUR,KLOR-CON) 20 MEQ CR tablet, Take 20 mEq by mouth 2 (Two) Times a Day., Disp: , Rfl:   •  semaglutide (WEGOVY), Inject 0.25 mg under the skin into the appropriate area as directed 1 (One) Time Per Week., Disp: 4 pen, Rfl: 0  •  terbinafine (lamiSIL) 250 MG tablet, Take 250 mg by mouth Daily., Disp: , Rfl:   •  traZODone (DESYREL) 50 MG tablet, Take 50 mg by mouth Daily., Disp: , Rfl:   •  vitamin D3 125 MCG (5000 UT) capsule capsule, Take 5,000 Units by mouth Daily., Disp: , Rfl:    also entered in Sleep Questionnaire         Vital Signs: /76   Pulse 78   Ht 165.1 cm (65\")   Wt 118 kg (260 lb)   BMI 43.27 kg/m²     Body mass index is 43.27 kg/m².       Tongue: Normal       Dentition: good       Pharynx: Posterior pharyngeal pillars are narrow   Mallampatti: II (hard and soft palate, upper portion of tonsils anduvula visible)        General: Alert. Cooperative. Well developed. No acute distress.             Head:  Normocephalic. Symmetrical. Atraumatic.              Nose: No septal deviation. No drainage.          Throat: No oral lesions. No thrush. Moist mucous membranes.    Chest Wall:  Normal shape. Symmetric expansion with respiration. No tenderness.             Neck:  Trachea midline.           Lungs:  Clear to auscultation bilaterally. No wheezes. No rhonchi. No rales. Respirations regular, even and unlabored.            Heart:  Regular rhythm and normal rate. Normal S1 and S2. No murmur.     Abdomen:  Soft, non-tender and non-distended. Normal bowel sounds. No masses.  Extremities:  Moves all extremities well. No edema.    Psychiatric: Normal mood and affect.    Diagnostic data available to date is as below and was reviewed on current visit:  · Split 3/20/15   Diagnostic study 10:40 p.m. to 2:02 a.m., sleep efficiency was poor  at 50% with 1.69 hours total sleep time. Sleep distribution was abnormal with  absence of REM sleep; however, the study was adequate for " diagnostic purposes.  The apnea hypopnea index was very, very severe at 66 events an hour. Due to  absence of supine and REM sleep, severity may be underestimated. Oxygen  saturation fell below 90% for 12% of total sleep time. Arousal index was 61.  The patient had 63% time snoring.      Following above, CPAP titration study from 2:02 a.m. to 6:34 a.m., sleep  efficiency was improved to 70%. Rebound in REM sleep to 35%. Apnea hypopnea,  orthopnea index was decreased to less than 5. Oxygen saturation during the  night improved. Arousal index improved. Periodic limb movement index improved.  Snoring index improved. CPAP was increased up to 10 cm water pressure. At 9 cm  water pressure, AHI was less than 5 and no significant desaturations. The  patient achieved supine and REM sleep.     · 5/4/18  The patient tolerated the home sleep testing with monitoring time of 415 minutes. The data obtained make this a technically adequate study. The apnea hypopneas index(AHI) was 24.5 per sleep hour.  The AHI during supine position was 26.8 per sleep hour. Mean heart rate of 60.3 BPM.  Snoring was noted 48.9% of sleep time. Lowest oxygen saturation during the study was 72%. Saturation below 89% was noted for 109 mins.      · 5/24/18  Overnight titration study from 9:21 PM to 4:06 AM.  Sleep efficiency 96%.  Normal sleep distribution.  AHI 0.3.  Adequate supine sleep and REM sleep.  Oxygen saturation remained above 88%.  No snoring.  CPAP increased from 5-9 cm water pressure.  Maximum sleep at 6 cm water pressure with maximum REM sleep at 8 cm water pressure.  Supine sleep only on the higher pressure settings.    Most current available usage data reviewed on 07/20/2021:  · 100% compliance average 7 hours 25 minutes AHI 1.8 average pressure 11.8 auto CPAP 5-15    DME Company: [employer]    No orders of the defined types were placed in this encounter.           Item HCPCS Description Qty Item HCPCS Description Qty     Mask Cushion  2-Mo x  Chin Strap 1-Q 6 Mo   x  Nasal Pillows 2-Mo   Tubing 1-Q 3 Mo     Mask Nasal 1-Q3 Mo x  Disposable Filter 2-Mo   x  Headgear 1-Q6 Mo x  Non-Disp Filter 1-Q6 Mo     Rep Cushion Full Face 1-Mo x  Heat Humidifier 1     Full Face Mask 1-Q3 Mo x  Water Chamber 1-Q6 Mo     Oral/Nasal Cushion 1-Q3 Mo   Replmt Oral Cushion 2-Mo     Replmt Nasal Pillows 2-Mo x  Heated Tubing 1-Q6 Mo         Impression:  1. Obstructive sleep apnea    2. Obesity, morbid, BMI 40.0-49.9 (CMS/HCC)        Plan:  Angelina is compliant and benefits from her CPAP.  Wakes up rested and refreshed.  She likes her new machine a lot more I suspect because of the auto CPAP settings.  She was reminded to change his supplies regularly.    She is waking up intermittently at night with sensation of warmth and then feeling cold.? Hot flashes or change in blood sugars.  She will discuss with her PCP.  She had a hysterectomy many years ago.    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    I reiterated the importance of effective treatment of obstructive sleep apnea with PAP machine.  Cardiovascular health risks of untreated sleep apnea were again reviewed.  Patient was asked to remain cautious if there is persistent hypersomnolence. The benefit of weight loss in reducing severity of obstructive sleep apnea was discussed.  Patient would benefit from adhering to a strict diet to achieve ideal BMI.     Change of PAP supplies regularly is important for effective use.  Change of cushion on the mask or plugs on nasal pillows along with disposable filters once every month and change of mask frame, tubing, headgear and Velcro straps every 6 months at the minimum was reiterated.    This patient is compliant with PAP machine and benefits from its use.  Apnea hypopneas index is corrected/improved.  Daytime hypersomnolence has resolved.     Patient will follow up in this clinic in April 2022 with  WALT    Thank you for allowing me to participate in your patient's care.    Electronically signed by Elier Ortiz MD, 07/20/21, 11:16 AM EDT.    Part of this note may be an electronic transcription/translation of spoken language to printed text using the Dragon Dictation System.

## 2021-07-26 ENCOUNTER — TRANSCRIBE ORDERS (OUTPATIENT)
Dept: ADMINISTRATIVE | Facility: HOSPITAL | Age: 66
End: 2021-07-26

## 2021-07-26 DIAGNOSIS — Z92.89 HISTORY OF MAMMOGRAPHY, SCREENING: Primary | ICD-10-CM

## 2021-08-06 ENCOUNTER — TELEPHONE (OUTPATIENT)
Dept: BARIATRICS/WEIGHT MGMT | Facility: CLINIC | Age: 66
End: 2021-08-06

## 2021-08-17 ENCOUNTER — HOSPITAL ENCOUNTER (OUTPATIENT)
Dept: MAMMOGRAPHY | Facility: HOSPITAL | Age: 66
Discharge: HOME | End: 2021-08-17

## 2021-08-17 DIAGNOSIS — Z92.89 HISTORY OF MAMMOGRAPHY, SCREENING: ICD-10-CM

## 2021-09-03 ENCOUNTER — HOSPITAL ENCOUNTER (OUTPATIENT)
Dept: GENERAL RADIOLOGY | Facility: HOSPITAL | Age: 66
Discharge: HOME | End: 2021-09-03
Admitting: INTERNAL MEDICINE

## 2021-09-03 ENCOUNTER — TRANSCRIBE ORDERS (OUTPATIENT)
Dept: ADMINISTRATIVE | Facility: HOSPITAL | Age: 66
End: 2021-09-03

## 2021-09-03 DIAGNOSIS — R60.9 EDEMA, UNSPECIFIED TYPE: Primary | ICD-10-CM

## 2021-09-03 DIAGNOSIS — R60.9 EDEMA, UNSPECIFIED TYPE: ICD-10-CM

## 2021-09-03 PROCEDURE — 73590 X-RAY EXAM OF LOWER LEG: CPT

## 2021-09-07 ENCOUNTER — TRANSCRIBE ORDERS (OUTPATIENT)
Dept: ULTRASOUND IMAGING | Facility: HOSPITAL | Age: 66
End: 2021-09-07

## 2021-09-07 DIAGNOSIS — Z87.2: ICD-10-CM

## 2021-09-07 DIAGNOSIS — N64.4 BREAST TENDERNESS: Primary | ICD-10-CM

## 2021-09-07 DIAGNOSIS — R23.4 BREAST SKIN CHANGES: ICD-10-CM

## 2021-09-07 DIAGNOSIS — Z80.3 FAMILY HISTORY OF BREAST CANCER: ICD-10-CM

## 2021-09-09 ENCOUNTER — TRANSCRIBE ORDERS (OUTPATIENT)
Dept: ADMINISTRATIVE | Facility: HOSPITAL | Age: 66
End: 2021-09-09

## 2021-09-09 DIAGNOSIS — Z87.2 HISTORY OF DIMPLING OF BREAST SKIN: Primary | ICD-10-CM

## 2021-09-09 DIAGNOSIS — Z80.3 FAMILY HISTORY OF BREAST CANCER: ICD-10-CM

## 2021-09-09 DIAGNOSIS — N64.4 BREAST TENDERNESS: ICD-10-CM

## 2021-10-29 ENCOUNTER — HOSPITAL ENCOUNTER (OUTPATIENT)
Dept: ULTRASOUND IMAGING | Facility: HOSPITAL | Age: 66
Discharge: HOME | End: 2021-10-29

## 2021-10-29 ENCOUNTER — TRANSCRIBE ORDERS (OUTPATIENT)
Dept: ADMINISTRATIVE | Facility: HOSPITAL | Age: 66
End: 2021-10-29

## 2021-10-29 ENCOUNTER — HOSPITAL ENCOUNTER (OUTPATIENT)
Dept: GENERAL RADIOLOGY | Facility: HOSPITAL | Age: 66
Discharge: HOME | End: 2021-10-29

## 2021-10-29 ENCOUNTER — HOSPITAL ENCOUNTER (OUTPATIENT)
Dept: MAMMOGRAPHY | Facility: HOSPITAL | Age: 66
Discharge: HOME | End: 2021-10-29

## 2021-10-29 DIAGNOSIS — M25.552 PAIN OF LEFT HIP JOINT: Primary | ICD-10-CM

## 2021-10-29 DIAGNOSIS — M25.552 PAIN OF LEFT HIP JOINT: ICD-10-CM

## 2021-10-29 DIAGNOSIS — N64.4 BREAST TENDERNESS: ICD-10-CM

## 2021-10-29 DIAGNOSIS — M54.9 BACK PAIN, UNSPECIFIED BACK LOCATION, UNSPECIFIED BACK PAIN LATERALITY, UNSPECIFIED CHRONICITY: ICD-10-CM

## 2021-10-29 DIAGNOSIS — Z80.3 FAMILY HISTORY OF BREAST CANCER: ICD-10-CM

## 2021-10-29 DIAGNOSIS — Z87.2: ICD-10-CM

## 2021-10-29 DIAGNOSIS — R23.4 BREAST SKIN CHANGES: ICD-10-CM

## 2021-10-29 DIAGNOSIS — Z87.2 HISTORY OF DIMPLING OF BREAST SKIN: ICD-10-CM

## 2021-10-29 PROCEDURE — G0279 TOMOSYNTHESIS, MAMMO: HCPCS

## 2021-10-29 PROCEDURE — 77066 DX MAMMO INCL CAD BI: CPT

## 2021-10-29 PROCEDURE — 73502 X-RAY EXAM HIP UNI 2-3 VIEWS: CPT

## 2021-10-29 PROCEDURE — 76642 ULTRASOUND BREAST LIMITED: CPT

## 2021-10-29 PROCEDURE — 72100 X-RAY EXAM L-S SPINE 2/3 VWS: CPT

## 2022-03-23 ENCOUNTER — OFFICE VISIT (OUTPATIENT)
Dept: SLEEP MEDICINE | Facility: HOSPITAL | Age: 67
End: 2022-03-23

## 2022-03-23 VITALS
HEIGHT: 65 IN | HEART RATE: 81 BPM | SYSTOLIC BLOOD PRESSURE: 126 MMHG | BODY MASS INDEX: 41.32 KG/M2 | WEIGHT: 248 LBS | DIASTOLIC BLOOD PRESSURE: 75 MMHG

## 2022-03-23 DIAGNOSIS — G47.33 OBSTRUCTIVE SLEEP APNEA: Primary | ICD-10-CM

## 2022-03-23 DIAGNOSIS — E66.01 OBESITY, MORBID, BMI 40.0-49.9: ICD-10-CM

## 2022-03-23 DIAGNOSIS — G25.81 RESTLESS LEGS SYNDROME (RLS): ICD-10-CM

## 2022-03-23 DIAGNOSIS — F51.04 PSYCHOPHYSIOLOGICAL INSOMNIA: ICD-10-CM

## 2022-03-23 PROCEDURE — G0463 HOSPITAL OUTPT CLINIC VISIT: HCPCS

## 2022-03-23 NOTE — PROGRESS NOTES
[facility]  Telephone: [phone] / Fax: [phone] [place]  Telephone: [phone] / Fax: [phone] [place] Perdomo    PCP: Lesly Anthony, NP-C    Reason for visit: RIVERA f/u    Angelina Narayan is a 66 y.o.female  was last seen at [facility] sleep lab in July 2021. She has an active CDL. Here to recertify. Still wakes up multiple times every few hours. No clear reason for awakening. She takes her Trazodone 50mg qhs and takes Pramipexole 7pm + 2 tylenol pm and melatonin 10mg. Goes to sleep at 9pm.  Up at 4 for work. CPAP helps to reduce nocturnal micturition and choking/gasping during sleep. She does not have tv in bedroom. She uses her phone until it is time to go to sleep.  Last caffeine intake is in the morning. Her last meal of the day is 6-7pm. She is unaware of GERD and does not drink alcohol.     SH - no tobacco    ROS +morning h/a    DME [employer]    Current Medications:    Current Outpatient Medications:   •  (No Medication Selected), Take 1,400 mg by mouth. Tumeric , Disp: , Rfl:   •  buPROPion XL (WELLBUTRIN XL) 150 MG 24 hr tablet, Take 150 mg by mouth Daily As Needed., Disp: , Rfl:   •  cetirizine (zyrTEC) 10 MG tablet, Take 10 mg by mouth As Needed., Disp: , Rfl:   •  furosemide (LASIX) 40 MG tablet, Take 40 mg by mouth Daily., Disp: , Rfl:   •  hydroxychloroquine (PLAQUENIL) 200 MG tablet, Take 200 mg by mouth Daily., Disp: , Rfl:   •  levothyroxine (SYNTHROID, LEVOTHROID) 75 MCG tablet, Take 75 mcg by mouth Daily., Disp: , Rfl:   •  Magnesium Cl-Calcium Carbonate (SLOW-MAG PO), Take 1 tablet by mouth Every Night., Disp: , Rfl:   •  multivitamin with minerals (Multivitamin Adult) tablet tablet, Take 1 tablet by mouth Daily., Disp: , Rfl:   •  potassium chloride (K-DUR,KLOR-CON) 20 MEQ CR tablet, Take 20 mEq by mouth 2 (Two) Times a Day., Disp: , Rfl:   •  semaglutide (WEGOVY), Inject 0.25 mg under the skin into the appropriate area as directed 1 (One) Time Per Week., Disp: 4 pen, Rfl:  "0  •  terbinafine (lamiSIL) 250 MG tablet, Take 250 mg by mouth Daily., Disp: , Rfl:   •  traZODone (DESYREL) 50 MG tablet, Take 50 mg by mouth Daily., Disp: , Rfl:   •  vitamin D3 125 MCG (5000 UT) capsule capsule, Take 5,000 Units by mouth Daily., Disp: , Rfl:    also entered in Sleep Questionnaire    Patient  has a past medical history of Anemia, DDD (degenerative disc disease), lumbar, Diabetes mellitus (HCC), Disease of thyroid gland, Fatigue, GERD (gastroesophageal reflux disease), Heartburn, Hiatal hernia, Hypertension, Joint pain, RIVERA (obstructive sleep apnea), and RLS (restless legs syndrome).    I have reviewed the Past Medical History, Past Surgical History, Social History and Family History.    Vital Signs /75   Pulse 81   Ht 165.1 cm (65\")   Wt 112 kg (248 lb)   BMI 41.27 kg/m²  Body mass index is 41.27 kg/m².    General Alert and oriented. No acute distress noted   Pharynx/Throat Class IV  Mallampati airway, large tongue, no evidence of redundant lateral pharyngeal tissue. No oral lesions. No thrush. Moist mucous membranes.   Head Normocephalic. Symmetrical. Atraumatic.    Nose No septal deviation. No drainage   Chest Wall Normal shape. Symmetric expansion with respiration. No tenderness.   Neck Trachea midline, no thyromegaly or adenopathy    Lungs Clear to auscultation bilaterally. No wheezes. No rhonchi. No rales. Respirations regular, even and unlabored.   Heart Regular rhythm and normal rate. Normal S1 and S2.   Abdomen Soft, non-tender and non-distended. Normal bowel sounds. No masses.   Extremities Moves all extremities well. No edema   Psychiatric Normal mood and affect.     Testing:  · Download 12/22/21-3/21/22 100% use with average nightly use of 7 hours and 23 minutes on auto CPAP 5-15cm H2O, AHI 1.8, leak of 7.1 L.min, P95 11.3 cm H2O    Study-5/4/18-  Diagnostic findings: The patient tolerated the home sleep testing with monitoring time of 415 minutes. The data obtained make this " a technically adequate study. The apnea hypopneas index(AHI) was 24.5 per sleep hour.  The AHI during supine position was 26.8 per sleep hour. Mean heart rate of 60.3 BPM.  Snoring was noted 48.9% of sleep time. Lowest oxygen saturation during the study was 72%. Saturation below 89% was noted for 109 mins.     Titration 5/18/18-Overnight titration study from 9:21 PM to 4:06 AM.  Sleep efficiency 96%.  Normal sleep distribution.  AHI 0.3.  Adequate supine sleep and REM sleep.  Oxygen saturation remained above 88%.  No snoring.  CPAP increased from 5-9 cm water pressure.  Maximum sleep at 6 cm water pressure with maximum REM sleep at 8 cm water pressure.  Supine sleep only on the higher pressure settings. Study shows excellent sleep efficiency and normal sleep distribution with CPAP device.  Appropriate pressures 8 cm water pressure.  Home CPAP should be set to the same.    Impression:  1. Obstructive sleep apnea    2. Obesity, morbid, BMI 40.0-49.9 (HCC)    3. Restless legs syndrome (RLS)    4. Psychophysiological insomnia          Plan:  Avoid phone use and bright light exposure for at least 1 hour prior to bedtime. I reviewed appropriate sleep hygiene strategies with patient. She was advised to add cardio exercise 30-40min every other day to her regimen to help improve sleep and overall wellbeing. I ordered overnight oximetry on CPAP RA to make sure desaturations do not play a role in recurrent nocturnal arousals. I have completed CDL clearance form. No objection for renewal of CDL with appropriate use of CPAP. Continue nasal cushion mask.    Follow up with Dr. Ortiz in one year    Thank you for allowing me to participate in your patient's care.      WALT Smith  [facility]  Phone: [phone]      Part of this note may be an electronic transcription/translation of spoken language to printed text using the Dragon Dictation System.

## 2022-05-27 RX ORDER — PRAMIPEXOLE DIHYDROCHLORIDE 0.5 MG/1
0.5 TABLET ORAL NIGHTLY
Qty: 90 TABLET | Refills: 1 | Status: SHIPPED | OUTPATIENT
Start: 2022-05-27 | End: 2022-05-31 | Stop reason: SDUPTHER

## 2022-05-31 RX ORDER — PRAMIPEXOLE DIHYDROCHLORIDE 0.5 MG/1
0.5 TABLET ORAL NIGHTLY
Qty: 90 TABLET | Refills: 1 | Status: SHIPPED | OUTPATIENT
Start: 2022-05-31 | End: 2022-06-01

## 2022-06-01 ENCOUNTER — TELEMEDICINE (OUTPATIENT)
Dept: FAMILY MEDICINE CLINIC | Facility: TELEHEALTH | Age: 67
End: 2022-06-01

## 2022-06-01 VITALS — HEIGHT: 65 IN | BODY MASS INDEX: 41.32 KG/M2 | WEIGHT: 248 LBS

## 2022-06-01 DIAGNOSIS — U07.1 COVID-19: Primary | ICD-10-CM

## 2022-06-01 PROBLEM — I10 ESSENTIAL HYPERTENSION: Status: RESOLVED | Noted: 2018-06-08 | Resolved: 2022-06-01

## 2022-06-01 PROBLEM — E66.01 MORBID OBESITY (HCC): Status: ACTIVE | Noted: 2021-05-14

## 2022-06-01 PROCEDURE — 99213 OFFICE O/P EST LOW 20 MIN: CPT | Performed by: NURSE PRACTITIONER

## 2022-06-01 RX ORDER — HYALURONATE SODIUM, STABILIZED 60 MG/3 ML
SYRINGE (ML) INTRAARTICULAR
COMMUNITY
Start: 2022-02-28

## 2022-06-01 RX ORDER — DEXTROMETHORPHAN HYDROBROMIDE AND PROMETHAZINE HYDROCHLORIDE 15; 6.25 MG/5ML; MG/5ML
5 SYRUP ORAL 4 TIMES DAILY PRN
Qty: 118 ML | Refills: 0 | Status: SHIPPED | OUTPATIENT
Start: 2022-06-01 | End: 2022-12-08

## 2022-06-01 RX ORDER — TOCILIZUMAB 20 MG/ML
INJECTION, SOLUTION, CONCENTRATE INTRAVENOUS
COMMUNITY
Start: 2022-03-24

## 2022-06-01 RX ORDER — BENZONATATE 200 MG/1
200 CAPSULE ORAL 3 TIMES DAILY PRN
Qty: 28 CAPSULE | Refills: 0 | Status: SHIPPED | OUTPATIENT
Start: 2022-06-01 | End: 2022-12-08

## 2022-06-01 RX ORDER — ACETAMINOPHEN/DIPHENHYDRAMINE 500MG-25MG
2 TABLET ORAL NIGHTLY PRN
COMMUNITY

## 2022-06-01 NOTE — PATIENT INSTRUCTIONS
Continue to treat symptoms.   Alternate tylenol and motrin for pain and/or fever, stay hydrated and rest.   Warning signs for COVID-19: trouble breathing, increasing shortness of breath, persistent chest pain or pressure, new confusion, inability to stay awake, pale, gray or blue-colored skin, lips or nail beds. If you show any of these signs seek Emergency Care.   If symptoms worsen or do not improve follow up with your PCP or visit your nearest Urgent Care Center or ER.    COVID QUARANTINE GUIDELINES:    You should Quarantine while you are waiting for your results.     Positive COVID result:  Isolate for 10 days from the date your symptoms began.  If symptoms fully resolve, you may end Isolation after 5 days from the onset of symptoms and wear a well-fitted mask for the additional 5 days.   If you can not wear a well-fitted mask AT ALL TIMES, you must remain in isolation for a full 10 days from the onset of symptoms.     If you have a Positive COVID result and NEVER had symptoms:  Isolate for 5 days from the date you had a positive test.   Wear a well-fitted mask for an additional 5 days.   If you can not wear a well fitted mask AT ALL TIMES, you must remain in isolation for the full 10 days from the onset of symptoms.       If you are NOT fully Vaccinated or had a Booster shot if eligible, but have had COVID EXPOSURE:  Quarantine for 10 days from the last day of exposure  Quarantine may be shortened if you have no symptoms and test Negative on day 5 post exposure.   Wear a well-fitted mask for 10 days from the last day of exposure.  If symptoms develop, stay home and get re-tested.     If HAVE been fully Vaccinated and had a Booster shot if eligible, but have had COVID EXPOSURE and have No Symptoms:  You do NOT need to quarantine  Wear a well-fitted mask for 10 days from the last day of exposure.  Get a COVID test on day 5.  If symptoms develop, stay home and get re-tested.

## 2022-06-01 NOTE — PROGRESS NOTES
Subjective   Chief Complaint   Patient presents with   • URI     COVID 19         Angelina Narayan is a 66 y.o. female.     Pt reports cough, body aches, chills, congestion, HA x 3 days. She feels rattling and mucus in her chest. She in tested positive for COVID yesterday. She has been vax, not boosted. No hx of COVID. BMI 41.27. No hx of renal or liver disease.    URI   This is a new problem. Episode onset: 3 days. The problem has been gradually worsening. Maximum temperature: tactile. Associated symptoms include congestion, coughing and headaches. Pertinent negatives include no abdominal pain, chest pain, nausea, vomiting or wheezing. She has tried acetaminophen for the symptoms.        Allergies   Allergen Reactions   • Morphine And Related Itching   • Sulfa Antibiotics Itching   • Toradol [Ketorolac Tromethamine] Itching   • Adhesive Tape Rash       Past Medical History:   Diagnosis Date   • Anemia    • DDD (degenerative disc disease), lumbar    • Diabetes mellitus (HCC)    • Disease of thyroid gland     HYPOTHYROID   • Fatigue    • GERD (gastroesophageal reflux disease)    • Heartburn    • Hiatal hernia    • Hypertension    • Joint pain    • RIVERA (obstructive sleep apnea)     CPAP   • RLS (restless legs syndrome)        Past Surgical History:   Procedure Laterality Date   •  SECTION     • COLONOSCOPY     • DILATATION AND CURETTAGE     • ENDOSCOPY     • GASTRIC SLEEVE LAPAROSCOPIC N/A 2017    Procedure: GASTRIC SLEEVE LAPAROSCOPIC WITH HIATAL HERNIA REPIAR, LYSIS OF ADHESIONS;  Surgeon: Job Douglass Jr., MD;  [place] [facility] [place] [facility] [place]  Service:    • HYSTERECTOMY     • KNEE ARTHROSCOPY     • LAPAROSCOPIC CHOLECYSTECTOMY     • SKIN GRAFT     • TONSILLECTOMY     • TUBAL ABDOMINAL LIGATION         Social History     Socioeconomic History   • Marital status:    • Number of children: 3   Tobacco Use   • Smoking status: Never Smoker   • Smokeless tobacco: Never Used   • Tobacco comment: daily  caffine   Substance and Sexual Activity   • Alcohol use: No   • Drug use: No   • Sexual activity: Defer       Family History   Problem Relation Age of Onset   • Obesity Mother    • Diabetes Mother    • Hypertension Mother    • Sleep apnea Mother    • Hypertension Father    • Stroke Father    • Heart disease Father    • Hypertension Brother    • Heart attack Brother    • Heart disease Brother    • Sleep apnea Brother    • Cancer Brother    • Heart attack Paternal Grandmother    • Breast cancer Maternal Aunt          Current Outpatient Medications:   •  tocilizumab (Actemra) 80 MG/4ML solution injection, Administer ACTEMRA 4mg/kg IV every 4 weeks, Disp: , Rfl:   •  (No Medication Selected), Take 1,400 mg by mouth. Tumeric , Disp: , Rfl:   •  benzonatate (TESSALON) 200 MG capsule, Take 1 capsule by mouth 3 (Three) Times a Day As Needed for Cough., Disp: 28 capsule, Rfl: 0  •  buPROPion XL (WELLBUTRIN XL) 150 MG 24 hr tablet, Take 150 mg by mouth Daily As Needed., Disp: , Rfl:   •  cetirizine (zyrTEC) 10 MG tablet, Take 10 mg by mouth As Needed., Disp: , Rfl:   •  diphenhydrAMINE-acetaminophen (Tylenol PM Extra Strength)  MG tablet per tablet, Take 2 tablets by mouth Daily., Disp: , Rfl:   •  Durolane 60 MG/3ML prefilled syringe injection, , Disp: , Rfl:   •  hydroxychloroquine (PLAQUENIL) 200 MG tablet, Take 200 mg by mouth Daily., Disp: , Rfl:   •  levothyroxine (SYNTHROID, LEVOTHROID) 75 MCG tablet, Take 75 mcg by mouth Daily., Disp: , Rfl:   •  Magnesium Cl-Calcium Carbonate (SLOW-MAG PO), Take 1 tablet by mouth Every Night., Disp: , Rfl:   •  multivitamin with minerals (Multivitamin Adult) tablet tablet, Take 1 tablet by mouth Daily., Disp: , Rfl:   •  Nirmatrelvir & Ritonavir (PAXLOVID) 20 x 150 MG & 10 x 100MG tablet therapy pack tablet, Take 3 tablets by mouth 2 (Two) Times a Day for 5 days., Disp: 30 tablet, Rfl: 0  •  potassium chloride (K-DUR,KLOR-CON) 20 MEQ CR tablet, Take 20 mEq by mouth 2 (Two)  "Times a Day., Disp: , Rfl:   •  promethazine-dextromethorphan (PROMETHAZINE-DM) 6.25-15 MG/5ML syrup, Take 5 mL by mouth 4 (Four) Times a Day As Needed for Cough., Disp: 118 mL, Rfl: 0  •  traZODone (DESYREL) 50 MG tablet, Take 50 mg by mouth Daily., Disp: , Rfl:   •  vitamin D3 125 MCG (5000 UT) capsule capsule, Take 5,000 Units by mouth Daily., Disp: , Rfl:       Review of Systems   Constitutional: Positive for fatigue.   HENT: Positive for congestion.    Respiratory: Positive for cough. Negative for chest tightness, shortness of breath and wheezing.    Cardiovascular: Negative for chest pain and palpitations.   Gastrointestinal: Negative for abdominal pain, nausea and vomiting.   Musculoskeletal: Positive for myalgias.   Neurological: Positive for headache.        Vitals:    06/01/22 0756   Weight: 112 kg (248 lb)   Height: 165.1 cm (65\")       Objective   Physical Exam  Constitutional:       General: She is not in acute distress.     Appearance: Normal appearance. She is not ill-appearing, toxic-appearing or diaphoretic.   HENT:      Head: Normocephalic.      Nose: Congestion present.      Comments: Per pt       Mouth/Throat:      Lips: Pink.      Mouth: Mucous membranes are moist.   Pulmonary:      Effort: Pulmonary effort is normal.   Neurological:      Mental Status: She is alert and oriented to person, place, and time.   Psychiatric:         Mood and Affect: Mood normal.         Behavior: Behavior normal.          Procedures     Assessment & Plan   Diagnoses and all orders for this visit:    1. COVID-19 (Primary)  -     Nirmatrelvir & Ritonavir (PAXLOVID) 20 x 150 MG & 10 x 100MG tablet therapy pack tablet; Take 3 tablets by mouth 2 (Two) Times a Day for 5 days.  Dispense: 30 tablet; Refill: 0  -     benzonatate (TESSALON) 200 MG capsule; Take 1 capsule by mouth 3 (Three) Times a Day As Needed for Cough.  Dispense: 28 capsule; Refill: 0  -     promethazine-dextromethorphan (PROMETHAZINE-DM) 6.25-15 MG/5ML " syrup; Take 5 mL by mouth 4 (Four) Times a Day As Needed for Cough.  Dispense: 118 mL; Refill: 0      Continue to treat symptoms.   Alternate tylenol and motrin for pain and/or fever, stay hydrated and rest.   Warning signs for COVID-19: trouble breathing, increasing shortness of breath, persistent chest pain or pressure, new confusion, inability to stay awake, pale, gray or blue-colored skin, lips or nail beds. If you show any of these signs seek Emergency Care.   If symptoms worsen or do not improve follow up with your PCP or visit your nearest Urgent Care Center or ER.    COVID QUARANTINE GUIDELINES:    You should Quarantine while you are waiting for your results.     Positive COVID result:  Isolate for 10 days from the date your symptoms began.  If symptoms fully resolve, you may end Isolation after 5 days from the onset of symptoms and wear a well-fitted mask for the additional 5 days.   If you can not wear a well-fitted mask AT ALL TIMES, you must remain in isolation for a full 10 days from the onset of symptoms.     If you have a Positive COVID result and NEVER had symptoms:  Isolate for 5 days from the date you had a positive test.   Wear a well-fitted mask for an additional 5 days.   If you can not wear a well fitted mask AT ALL TIMES, you must remain in isolation for the full 10 days from the onset of symptoms.       If you are NOT fully Vaccinated or had a Booster shot if eligible, but have had COVID EXPOSURE:  Quarantine for 10 days from the last day of exposure  Quarantine may be shortened if you have no symptoms and test Negative on day 5 post exposure.   Wear a well-fitted mask for 10 days from the last day of exposure.  If symptoms develop, stay home and get re-tested.     If HAVE been fully Vaccinated and had a Booster shot if eligible, but have had COVID EXPOSURE and have No Symptoms:  You do NOT need to quarantine  Wear a well-fitted mask for 10 days from the last day of exposure.  Get a COVID  test on day 5.  If symptoms develop, stay home and get re-tested.       PLAN: Discussed COVID treatment options (MA infusion and oral antivirals). Pt would like to take Paxlovid. Sent fact sheet,. Advised that while taking Paxlovid, Wellbutrin effects may be decreased. Pt verb understanding.  Discussed dosing, side effects, recommended other symptomatic care.  Patient should follow up with primary care provider, Urgent Care or ER if symptoms worsen, fail to resolve or other symptoms need attention. Patient/family agree to the above.         WALT Tucker     The use of a video visit has been reviewed with the patient and verbal informd consent has een obtained. Myself and Angelina Narayan participated in this visit. The patient is located at [place] [address]. I am located in [place], [place]. Mychart and Zoom were utilized.        This visit was performed via Telehealth.  This patient has been instructed to follow-up with their primary care provider if their symptoms worsen or the treatment provided does not resolve their illness.

## 2022-07-03 ENCOUNTER — HOSPITAL ENCOUNTER (EMERGENCY)
Facility: HOSPITAL | Age: 67
Discharge: HOME | End: 2022-07-03
Attending: EMERGENCY MEDICINE | Admitting: EMERGENCY MEDICINE

## 2022-07-03 VITALS
WEIGHT: 247 LBS | TEMPERATURE: 97.1 F | HEART RATE: 84 BPM | HEIGHT: 65 IN | OXYGEN SATURATION: 98 % | SYSTOLIC BLOOD PRESSURE: 151 MMHG | RESPIRATION RATE: 16 BRPM | DIASTOLIC BLOOD PRESSURE: 90 MMHG | BODY MASS INDEX: 41.15 KG/M2

## 2022-07-03 DIAGNOSIS — L03.119 CELLULITIS OF LOWER LEG: Primary | ICD-10-CM

## 2022-07-03 PROCEDURE — 99283 EMERGENCY DEPT VISIT LOW MDM: CPT

## 2022-07-03 RX ORDER — CEPHALEXIN 500 MG/1
500 CAPSULE ORAL 4 TIMES DAILY
Qty: 28 CAPSULE | Refills: 0 | Status: SHIPPED | OUTPATIENT
Start: 2022-07-03 | End: 2022-07-10

## 2022-07-03 RX ORDER — DOXYCYCLINE 100 MG/1
100 CAPSULE ORAL 2 TIMES DAILY
Qty: 14 CAPSULE | Refills: 0 | Status: SHIPPED | OUTPATIENT
Start: 2022-07-03 | End: 2022-07-10

## 2022-07-03 RX ORDER — DOXYCYCLINE 100 MG/1
100 CAPSULE ORAL ONCE
Status: COMPLETED | OUTPATIENT
Start: 2022-07-03 | End: 2022-07-03

## 2022-07-03 RX ORDER — CEPHALEXIN 500 MG/1
500 CAPSULE ORAL ONCE
Status: COMPLETED | OUTPATIENT
Start: 2022-07-03 | End: 2022-07-03

## 2022-07-03 RX ORDER — FLUCONAZOLE 150 MG/1
150 TABLET ORAL ONCE
Qty: 1 TABLET | Refills: 0 | Status: SHIPPED | OUTPATIENT
Start: 2022-07-03 | End: 2022-07-03

## 2022-07-03 RX ADMIN — DOXYCYCLINE 100 MG: 100 CAPSULE ORAL at 15:08

## 2022-07-03 RX ADMIN — CEPHALEXIN 500 MG: 500 CAPSULE ORAL at 15:08

## 2022-07-03 NOTE — DISCHARGE INSTRUCTIONS
Take antibiotics as directed.  Keep legs elevated when possible.  Please return to the emergency room for any worsening pain, swelling, redness, fevers, weakness or any other concerns.

## 2022-07-03 NOTE — ED PROVIDER NOTES
EMERGENCY DEPARTMENT ENCOUNTER    Room Number:  08/08  Date seen:  7/3/2022  PCP: Lesly Anthony NP-C  Historian: Patient      HPI:  Chief Complaint: Leg redness and swelling and tenderness  A complete HPI/ROS/PMH/PSH/SH/FH are unobtainable due to: N/A  Context: Angelina Narayan is a 66 y.o. female who presents to the ED c/o right lower leg redness with swelling and tenderness.  Patient says that this morning she accidentally injured herself in the right lower leg with a [place] shrub outside as she scraped her leg against the bush.  It caused a small abrasion injury with some slight bleeding.  Since that time, patient has had some development of increasing pain and redness and swelling around the site of the injury.  The redness seems to be spreading upward as well.  Patient expresses concern because she has a history of poor circulation in her legs with known history of poor venous valves that have been recommended for surgery in the past.            PAST MEDICAL HISTORY  Active Ambulatory Problems     Diagnosis Date Noted   • RIVERA (obstructive sleep apnea) 02/21/2017   • Type 2 diabetes mellitus with obesity ([facility]) 02/21/2017   • Chronic fatigue 02/21/2017   • Edema 02/21/2017   • History of gastric ulcer 02/21/2017   • Multiple joint pain 02/21/2017   • Depressive disorder 02/21/2017   • Hypothyroidism 02/21/2017   • Gastric polyp 06/01/2017   • Dietary counseling 06/15/2017   • Encopresis 09/06/2017   • Gastroesophageal reflux disease 06/08/2018   • Rheumatoid arthritis ([facility]) 10/14/2015   • Arthritis of knee 12/11/2015   • Obesity, Class III, BMI 40-49.9 (morbid obesity) ([facility]) 05/14/2021   • Morbid obesity ([facility]) 05/14/2021     Resolved Ambulatory Problems     Diagnosis Date Noted   • Obesity, Class I, BMI 30-34.9 02/21/2017   • GERD (gastroesophageal reflux disease) 02/21/2017   • Hiatal hernia 02/21/2017   • Post-op pain 06/15/2017   • Obesity, Class II, BMI 35-39.9 06/29/2018   • Essential hypertension 06/08/2018    • Unintended weight gain 2018   • Dysmorphism 12/10/2015     Past Medical History:   Diagnosis Date   • Anemia    • DDD (degenerative disc disease), lumbar    • Diabetes mellitus (HCC)    • Disease of thyroid gland    • Fatigue    • Heartburn    • Hypertension    • Joint pain    • RLS (restless legs syndrome)          PAST SURGICAL HISTORY  Past Surgical History:   Procedure Laterality Date   •  SECTION     • COLONOSCOPY     • DILATATION AND CURETTAGE     • ENDOSCOPY     • GASTRIC SLEEVE LAPAROSCOPIC N/A 2017    Procedure: GASTRIC SLEEVE LAPAROSCOPIC WITH HIATAL HERNIA REPIAR, LYSIS OF ADHESIONS;  Surgeon: Job Douglass Jr., MD;  Location: [facility] OR [facility] [place];  Service:    • HYSTERECTOMY     • KNEE ARTHROSCOPY     • LAPAROSCOPIC CHOLECYSTECTOMY     • SKIN GRAFT     • TONSILLECTOMY     • TUBAL ABDOMINAL LIGATION           FAMILY HISTORY  Family History   Problem Relation Age of Onset   • Obesity Mother    • Diabetes Mother    • Hypertension Mother    • Sleep apnea Mother    • Hypertension Father    • Stroke Father    • Heart disease Father    • Hypertension Brother    • Heart attack Brother    • Heart disease Brother    • Sleep apnea Brother    • Cancer Brother    • Heart attack Paternal Grandmother    • Breast cancer Maternal Aunt          SOCIAL HISTORY  Social History     Socioeconomic History   • Marital status:    • Number of children: 3   Tobacco Use   • Smoking status: Never Smoker   • Smokeless tobacco: Never Used   • Tobacco comment: daily caffine   Substance and Sexual Activity   • Alcohol use: No   • Drug use: No   • Sexual activity: Defer         ALLERGIES  Morphine and related, Sulfa antibiotics, Toradol [ketorolac tromethamine], and Adhesive tape        REVIEW OF SYSTEMS  Review of Systems   Constitutional: Negative for activity change, diaphoresis and fever.   HENT: Negative.    Eyes: Negative for visual disturbance.   Respiratory: Negative for cough and shortness of breath.     Cardiovascular: Positive for leg swelling. Negative for chest pain.   Gastrointestinal: Negative for abdominal pain and vomiting.   Musculoskeletal: Positive for myalgias.   Skin: Positive for color change and wound.   Neurological: Negative for syncope and headaches.   All other systems reviewed and are negative.         PHYSICAL EXAM  ED Triage Vitals [07/03/22 1445]   Temp Heart Rate Resp BP SpO2   97.1 °F (36.2 °C) 84 16 151/90 98 %      Temp src Heart Rate Source Patient Position BP Location FiO2 (%)   Temporal Monitor -- -- --         GENERAL: Pleasant lady, calm, no acute distress  HENT: nares patent, normocephalic and atraumatic  EYES: no scleral icterus, EOMI  CV: regular rhythm, normal rate, normal distal pulses palpated at the anterior tibialis  RESPIRATORY: normal effort, no stridor  MUSCULOSKELETAL: The distal right anterior tibial soft tissue shows a very superficial wound which is about 1 cm in size and has scant fresh blood but no active bleeding.  Careful palpation and examination of the wound shows no evidence of retained vegetation or foreign body of any kind.  The surrounding soft tissues are tender and mildly indurated.  There is some erythematous base surrounding this wound with some spread of erythema more proximally to the right mid tibial level.  Distal neurovascular exam is normal.  NEURO: alert, moves all extremities, follows commands, no focal deficits.  PSYCH:  calm, cooperative  SKIN: warm, dry    Vital signs and nursing notes reviewed.          LAB RESULTS  No results found for this or any previous visit (from the past 24 hour(s)).    Ordered the above labs and reviewed the results.        RADIOLOGY  No Radiology Exams Resulted Within Past 24 Hours    Ordered the above noted radiological studies. Reviewed by me in PACS.            PROCEDURES  Procedures              MEDICATIONS GIVEN IN ER  Medications   cephalexin (KEFLEX) capsule 500 mg (500 mg Oral Given 7/3/22 3489)   doxycycline  (MONODOX) capsule 100 mg (100 mg Oral Given 7/3/22 7115)                   MEDICAL DECISION MAKING, PROGRESS, and CONSULTS    All labs have been independently reviewed by me.  All radiology studies have been reviewed by me and discussed with radiologist dictating the report.   EKG's independently viewed and interpreted by me.  Discussion below represents my analysis of pertinent findings related to patient's condition, differential diagnosis, treatment plan and final disposition.          ED Course as of 07/03/22 1524   Sun Jul 03, 2022   1522 Patient presented for evaluation of some new onset redness and tenderness and swelling around the site of a minor abrasion wound to the lower leg this morning.  Exam is indeed consistent with early cellulitic changes.  I would ordinarily put her on Keflex and Bactrim combination for this type of presentation but unfortunately she has a sulfa allergy listed in her chart.  Therefore I elected to do a combination of Keflex and doxycycline instead, with hopes to capture some gram-negative coverage if that were necessary.  I had a long discussion with the patient and her  about the plan for the next 24 hours.  I asked her to try these antibiotics and see how the response is in the next 24 hours.  I told her very clearly that if the pain or redness or swelling seem to be worsening and not improving within 24 hours, then she needs to come back here for further evaluation and allow us to do some further test and possibly initiate IV antibiotics.  She expressed understanding of this recommendation and plan of care.  She will otherwise follow-up with her PCP in the office this week.  And she was discharged home in good condition. [STEVEN]      ED Course User Index  [STEVEN] Sumit Hansen MD                DIAGNOSIS  Final diagnoses:   Cellulitis of lower leg         DISPOSITION  Home            Latest Documented Vital Signs:  As of 15:24 EDT  BP- 151/90 HR- 84 Temp- 97.1 °F (36.2 °C)  (Temporal) O2 sat- 98%        --    Please note that portions of this were completed with a voice recognition program.          Sumit Hansen MD  07/03/22 0368

## 2022-09-21 ENCOUNTER — HOSPITAL ENCOUNTER (EMERGENCY)
Facility: HOSPITAL | Age: 67
Discharge: HOME | End: 2022-09-21
Attending: EMERGENCY MEDICINE | Admitting: EMERGENCY MEDICINE

## 2022-09-21 VITALS
DIASTOLIC BLOOD PRESSURE: 107 MMHG | SYSTOLIC BLOOD PRESSURE: 166 MMHG | HEART RATE: 87 BPM | HEIGHT: 65 IN | OXYGEN SATURATION: 97 % | BODY MASS INDEX: 41.1 KG/M2 | WEIGHT: 246.7 LBS | TEMPERATURE: 98.4 F | RESPIRATION RATE: 18 BRPM

## 2022-09-21 DIAGNOSIS — L03.115 CELLULITIS OF LEG WITHOUT FOOT, RIGHT: Primary | ICD-10-CM

## 2022-09-21 LAB
ALBUMIN SERPL-MCNC: 4.2 G/DL (ref 3.5–5.2)
ALBUMIN/GLOB SERPL: 1.6 G/DL
ALP SERPL-CCNC: 91 U/L (ref 39–117)
ALT SERPL W P-5'-P-CCNC: 12 U/L (ref 1–33)
ANION GAP SERPL CALCULATED.3IONS-SCNC: 6.9 MMOL/L (ref 5–15)
AST SERPL-CCNC: 15 U/L (ref 1–32)
BASOPHILS # BLD AUTO: 0.08 10*3/MM3 (ref 0–0.2)
BASOPHILS NFR BLD AUTO: 1 % (ref 0–1.5)
BILIRUB SERPL-MCNC: 0.4 MG/DL (ref 0–1.2)
BUN SERPL-MCNC: 12 MG/DL (ref 8–23)
BUN/CREAT SERPL: 16.4 (ref 7–25)
CALCIUM SPEC-SCNC: 8.9 MG/DL (ref 8.6–10.5)
CHLORIDE SERPL-SCNC: 102 MMOL/L (ref 98–107)
CO2 SERPL-SCNC: 29.1 MMOL/L (ref 22–29)
CREAT SERPL-MCNC: 0.73 MG/DL (ref 0.57–1)
D-LACTATE SERPL-SCNC: 1 MMOL/L (ref 0.5–2)
DEPRECATED RDW RBC AUTO: 43.2 FL (ref 37–54)
EGFRCR SERPLBLD CKD-EPI 2021: 92 ML/MIN/1.73
EOSINOPHIL # BLD AUTO: 0.19 10*3/MM3 (ref 0–0.4)
EOSINOPHIL NFR BLD AUTO: 2.3 % (ref 0.3–6.2)
ERYTHROCYTE [DISTWIDTH] IN BLOOD BY AUTOMATED COUNT: 12.6 % (ref 12.3–15.4)
GLOBULIN UR ELPH-MCNC: 2.6 GM/DL
GLUCOSE SERPL-MCNC: 80 MG/DL (ref 65–99)
HCT VFR BLD AUTO: 43.9 % (ref 34–46.6)
HGB BLD-MCNC: 14.4 G/DL (ref 12–15.9)
IMM GRANULOCYTES # BLD AUTO: 0.04 10*3/MM3 (ref 0–0.05)
IMM GRANULOCYTES NFR BLD AUTO: 0.5 % (ref 0–0.5)
LYMPHOCYTES # BLD AUTO: 1.73 10*3/MM3 (ref 0.7–3.1)
LYMPHOCYTES NFR BLD AUTO: 21.4 % (ref 19.6–45.3)
MCH RBC QN AUTO: 30.6 PG (ref 26.6–33)
MCHC RBC AUTO-ENTMCNC: 32.8 G/DL (ref 31.5–35.7)
MCV RBC AUTO: 93.2 FL (ref 79–97)
MONOCYTES # BLD AUTO: 0.77 10*3/MM3 (ref 0.1–0.9)
MONOCYTES NFR BLD AUTO: 9.5 % (ref 5–12)
NEUTROPHILS NFR BLD AUTO: 5.29 10*3/MM3 (ref 1.7–7)
NEUTROPHILS NFR BLD AUTO: 65.3 % (ref 42.7–76)
NRBC BLD AUTO-RTO: 0 /100 WBC (ref 0–0.2)
PLATELET # BLD AUTO: 248 10*3/MM3 (ref 140–450)
PMV BLD AUTO: 10.8 FL (ref 6–12)
POTASSIUM SERPL-SCNC: 3.8 MMOL/L (ref 3.5–5.2)
PROCALCITONIN SERPL-MCNC: 0.02 NG/ML (ref 0–0.25)
PROT SERPL-MCNC: 6.8 G/DL (ref 6–8.5)
RBC # BLD AUTO: 4.71 10*6/MM3 (ref 3.77–5.28)
SODIUM SERPL-SCNC: 138 MMOL/L (ref 136–145)
WBC NRBC COR # BLD: 8.1 10*3/MM3 (ref 3.4–10.8)

## 2022-09-21 PROCEDURE — 85025 COMPLETE CBC W/AUTO DIFF WBC: CPT | Performed by: EMERGENCY MEDICINE

## 2022-09-21 PROCEDURE — 84145 PROCALCITONIN (PCT): CPT | Performed by: EMERGENCY MEDICINE

## 2022-09-21 PROCEDURE — 36415 COLL VENOUS BLD VENIPUNCTURE: CPT

## 2022-09-21 PROCEDURE — 80053 COMPREHEN METABOLIC PANEL: CPT | Performed by: EMERGENCY MEDICINE

## 2022-09-21 PROCEDURE — 83605 ASSAY OF LACTIC ACID: CPT | Performed by: EMERGENCY MEDICINE

## 2022-09-21 PROCEDURE — 99283 EMERGENCY DEPT VISIT LOW MDM: CPT

## 2022-09-21 RX ORDER — SODIUM CHLORIDE 0.9 % (FLUSH) 0.9 %
10 SYRINGE (ML) INJECTION AS NEEDED
Status: DISCONTINUED | OUTPATIENT
Start: 2022-09-21 | End: 2022-09-21 | Stop reason: HOSPADM

## 2022-09-21 RX ORDER — CEPHALEXIN 500 MG/1
500 CAPSULE ORAL ONCE
Status: COMPLETED | OUTPATIENT
Start: 2022-09-21 | End: 2022-09-21

## 2022-09-21 RX ORDER — DOXYCYCLINE 100 MG/1
100 CAPSULE ORAL ONCE
Status: COMPLETED | OUTPATIENT
Start: 2022-09-21 | End: 2022-09-21

## 2022-09-21 RX ORDER — FLUCONAZOLE 150 MG/1
TABLET ORAL
Qty: 2 TABLET | Refills: 0 | Status: SHIPPED | OUTPATIENT
Start: 2022-09-21 | End: 2022-12-08

## 2022-09-21 RX ORDER — CEPHALEXIN 500 MG/1
500 CAPSULE ORAL 4 TIMES DAILY
Qty: 28 CAPSULE | Refills: 0 | Status: SHIPPED | OUTPATIENT
Start: 2022-09-21 | End: 2022-09-28

## 2022-09-21 RX ORDER — FLUCONAZOLE 100 MG/1
200 TABLET ORAL ONCE
Status: COMPLETED | OUTPATIENT
Start: 2022-09-21 | End: 2022-09-21

## 2022-09-21 RX ORDER — DOXYCYCLINE 100 MG/1
100 CAPSULE ORAL 2 TIMES DAILY
Qty: 14 CAPSULE | Refills: 0 | Status: SHIPPED | OUTPATIENT
Start: 2022-09-21 | End: 2022-09-28

## 2022-09-21 RX ADMIN — CEPHALEXIN 500 MG: 500 CAPSULE ORAL at 20:42

## 2022-09-21 RX ADMIN — DOXYCYCLINE 100 MG: 100 CAPSULE ORAL at 20:42

## 2022-09-21 RX ADMIN — FLUCONAZOLE 200 MG: 100 TABLET ORAL at 20:42

## 2022-09-21 NOTE — ED PROVIDER NOTES
EMERGENCY DEPARTMENT ENCOUNTER    Room Number:  08/08  Date seen:  9/21/2022  PCP: Lesly Anthony NP-C  Historian: Patient      HPI:  Chief Complaint: Leg pain and redness  A complete HPI/ROS/PMH/PSH/SH/FH are unobtainable due to: N/A  Context: Angelina Narayan is a 66 y.o. female who presents to the ED c/o persistent and worsening pain with redness and swelling in the right lower leg.  Patient says that 1 week ago she accidentally struck her right lower leg against a bed rail at [facility] while she was there to visit her .  This caused a skin tear wound in the right lower leg.  While she was there, she was seen as a patient and they dressed her wound, repairing with Steri-Strips and then started her on clindamycin.  She has just finished a weeklong course of clindamycin but is discouraged because her right lower leg is still red and swollen and painful with a burning pain sensation.  Patient has had history of cellulitis in the legs before.  She has chronic edema to both legs.        PAST MEDICAL HISTORY  Active Ambulatory Problems     Diagnosis Date Noted   • RIVERA (obstructive sleep apnea) 02/21/2017   • Type 2 diabetes mellitus with obesity ([facility]) 02/21/2017   • Chronic fatigue 02/21/2017   • Edema 02/21/2017   • History of gastric ulcer 02/21/2017   • Multiple joint pain 02/21/2017   • Depressive disorder 02/21/2017   • Hypothyroidism 02/21/2017   • Gastric polyp 06/01/2017   • Dietary counseling 06/15/2017   • Encopresis 09/06/2017   • Gastroesophageal reflux disease 06/08/2018   • Rheumatoid arthritis ([facility]) 10/14/2015   • Arthritis of knee 12/11/2015   • Obesity, Class III, BMI 40-49.9 (morbid obesity) ([facility]) 05/14/2021   • Morbid obesity ([facility]) 05/14/2021     Resolved Ambulatory Problems     Diagnosis Date Noted   • Obesity, Class I, BMI 30-34.9 02/21/2017   • GERD (gastroesophageal reflux disease) 02/21/2017   • Hiatal hernia 02/21/2017   • Post-op pain 06/15/2017   • Obesity, Class II,  BMI 35-39.9 2018   • Essential hypertension 2018   • Unintended weight gain 2018   • Dysmorphism 12/10/2015     Past Medical History:   Diagnosis Date   • Anemia    • DDD (degenerative disc disease), lumbar    • Diabetes mellitus (HCC)    • Disease of thyroid gland    • Fatigue    • Heartburn    • Hypertension    • Joint pain    • RLS (restless legs syndrome)          PAST SURGICAL HISTORY  Past Surgical History:   Procedure Laterality Date   •  SECTION     • COLONOSCOPY     • DILATATION AND CURETTAGE     • ENDOSCOPY     • GASTRIC SLEEVE LAPAROSCOPIC N/A 2017    Procedure: GASTRIC SLEEVE LAPAROSCOPIC WITH HIATAL HERNIA REPIAR, LYSIS OF ADHESIONS;  Surgeon: Job Douglass Jr., MD;  [place] [facility] [place] [facility] [place]  Service:    • HYSTERECTOMY     • KNEE ARTHROSCOPY     • LAPAROSCOPIC CHOLECYSTECTOMY     • SKIN GRAFT     • TONSILLECTOMY     • TUBAL ABDOMINAL LIGATION           FAMILY HISTORY  Family History   Problem Relation Age of Onset   • Obesity Mother    • Diabetes Mother    • Hypertension Mother    • Sleep apnea Mother    • Hypertension Father    • Stroke Father    • Heart disease Father    • Hypertension Brother    • Heart attack Brother    • Heart disease Brother    • Sleep apnea Brother    • Cancer Brother    • Heart attack Paternal Grandmother    • Breast cancer Maternal Aunt          SOCIAL HISTORY  Social History     Socioeconomic History   • Marital status:    • Number of children: 3   Tobacco Use   • Smoking status: Never Smoker   • Smokeless tobacco: Never Used   • Tobacco comment: daily caffine   Substance and Sexual Activity   • Alcohol use: No   • Drug use: No   • Sexual activity: Defer         ALLERGIES  Morphine and related, Sulfa antibiotics, Toradol [ketorolac tromethamine], and Adhesive tape        REVIEW OF SYSTEMS  Review of Systems   Constitutional: Negative for activity change, diaphoresis and fever.   HENT: Negative.    Eyes: Negative for pain and visual  disturbance.   Respiratory: Negative for cough and shortness of breath.    Cardiovascular: Positive for leg swelling. Negative for chest pain.   Gastrointestinal: Negative for abdominal pain and vomiting.   Genitourinary: Negative for dysuria.   Musculoskeletal: Positive for myalgias.   Skin: Positive for color change and wound.   Neurological: Negative for syncope and headaches.   All other systems reviewed and are negative.       PHYSICAL EXAM  ED Triage Vitals [09/21/22 1921]   Temp Heart Rate Resp BP SpO2   98.4 °F (36.9 °C) 87 18 (!) 166/107 97 %      Temp src Heart Rate Source Patient Position BP Location FiO2 (%)   Oral -- -- -- --         GENERAL: Pleasant lady, lying comfortably in bed, no acute distress  HENT: nares patent, normocephalic and atraumatic  EYES: no scleral icterus, EOMI  CV: regular rhythm, normal rate, normal distal pulses  RESPIRATORY: normal effort no stridor, lungs clear  ABDOMEN: soft, nontender in all quadrants  MUSCULOSKELETAL: There is a subacute wound notable to the right distal lateral lower leg soft tissues with scab tissue present.  There is surrounding erythema and induration extending nearly circumferentially around the distal third of the right lower leg.  This affected area is tender to palpation.  Distal neurovascular exam at the right foot is normal.  The left lower leg is wrapped with an Ace compression wrap from the ankle to the knee.  NEURO: alert, moves all extremities, follows commands  PSYCH:  calm, cooperative  SKIN: warm, dry    Vital signs and nursing notes reviewed.          LAB RESULTS  Recent Results (from the past 24 hour(s))   Comprehensive Metabolic Panel    Collection Time: 09/21/22  7:35 PM    Specimen: Blood   Result Value Ref Range    Glucose 80 65 - 99 mg/dL    BUN 12 8 - 23 mg/dL    Creatinine 0.73 0.57 - 1.00 mg/dL    Sodium 138 136 - 145 mmol/L    Potassium 3.8 3.5 - 5.2 mmol/L    Chloride 102 98 - 107 mmol/L    CO2 29.1 (H) 22.0 - 29.0 mmol/L     Calcium 8.9 8.6 - 10.5 mg/dL    Total Protein 6.8 6.0 - 8.5 g/dL    Albumin 4.20 3.50 - 5.20 g/dL    ALT (SGPT) 12 1 - 33 U/L    AST (SGOT) 15 1 - 32 U/L    Alkaline Phosphatase 91 39 - 117 U/L    Total Bilirubin 0.4 0.0 - 1.2 mg/dL    Globulin 2.6 gm/dL    A/G Ratio 1.6 g/dL    BUN/Creatinine Ratio 16.4 7.0 - 25.0    Anion Gap 6.9 5.0 - 15.0 mmol/L    eGFR 92.0 >60.0 mL/min/1.73   Procalcitonin    Collection Time: 09/21/22  7:35 PM    Specimen: Blood   Result Value Ref Range    Procalcitonin 0.02 0.00 - 0.25 ng/mL   Lactic Acid, Plasma    Collection Time: 09/21/22  7:35 PM    Specimen: Blood   Result Value Ref Range    Lactate 1.0 0.5 - 2.0 mmol/L   CBC Auto Differential    Collection Time: 09/21/22  7:35 PM    Specimen: Blood   Result Value Ref Range    WBC 8.10 3.40 - 10.80 10*3/mm3    RBC 4.71 3.77 - 5.28 10*6/mm3    Hemoglobin 14.4 12.0 - 15.9 g/dL    Hematocrit 43.9 34.0 - 46.6 %    MCV 93.2 79.0 - 97.0 fL    MCH 30.6 26.6 - 33.0 pg    MCHC 32.8 31.5 - 35.7 g/dL    RDW 12.6 12.3 - 15.4 %    RDW-SD 43.2 37.0 - 54.0 fl    MPV 10.8 6.0 - 12.0 fL    Platelets 248 140 - 450 10*3/mm3    Neutrophil % 65.3 42.7 - 76.0 %    Lymphocyte % 21.4 19.6 - 45.3 %    Monocyte % 9.5 5.0 - 12.0 %    Eosinophil % 2.3 0.3 - 6.2 %    Basophil % 1.0 0.0 - 1.5 %    Immature Grans % 0.5 0.0 - 0.5 %    Neutrophils, Absolute 5.29 1.70 - 7.00 10*3/mm3    Lymphocytes, Absolute 1.73 0.70 - 3.10 10*3/mm3    Monocytes, Absolute 0.77 0.10 - 0.90 10*3/mm3    Eosinophils, Absolute 0.19 0.00 - 0.40 10*3/mm3    Basophils, Absolute 0.08 0.00 - 0.20 10*3/mm3    Immature Grans, Absolute 0.04 0.00 - 0.05 10*3/mm3    nRBC 0.0 0.0 - 0.2 /100 WBC       Ordered the above labs and reviewed the results.        RADIOLOGY  No Radiology Exams Resulted Within Past 24 Hours    Ordered the above noted radiological studies. Reviewed by me in PACS.            PROCEDURES  Procedures            MEDICATIONS GIVEN IN ER  Medications   sodium chloride 0.9 % flush 10 mL  (has no administration in time range)   cephalexin (KEFLEX) capsule 500 mg (has no administration in time range)   doxycycline (MONODOX) capsule 100 mg (has no administration in time range)   fluconazole (DIFLUCAN) tablet 200 mg (has no administration in time range)                   MEDICAL DECISION MAKING, PROGRESS, and CONSULTS    All labs have been independently reviewed by me.  All radiology studies have been reviewed by me and discussed with radiologist dictating the report.   EKG's independently viewed and interpreted by me.  Discussion below represents my analysis of pertinent findings related to patient's condition, differential diagnosis, treatment plan and final disposition.          ED Course as of 09/21/22 2040   Wed Sep 21, 2022   2015 I reviewed the labs from today's visit.  Patient's white blood cell count is normal.  Lactic acid is normal and procalcitonin are normal.  She is afebrile and nontoxic-appearing.  I discussed the patient's presentation with Dr. Meredith the hospitalist.  He reviewed the labs and the patient's clinical scenario.  He requested that the patient try a second round of oral antibiotics in the outpatient setting rather than bringing her in for IV antibiotics at this time.  He felt that the clindamycin may have been a suboptimal choice for her soft tissue infection of the right lower leg.  He said that if patient fails second round of oral antibiotics then it would be more appropriate and favorable for her to come into the hospital for IV antibiotic therapy.  I explained this recommendation and plan of care to the patient at the bedside.  She agreed to try a second round of antibiotics.  Therefore I will prescribe for her Keflex and doxycycline which is a combination that seem to work successfully for her in the past.  She requested that I also prescribe Diflucan tablets which I will be happy to do.  I will encourage her to follow-up promptly with her PCP.  I did review with her  "clearly the usual \"return to ER\" instructions for any worsening signs or symptoms and I explained to her that if her redness and swelling and pain are not improving within the next 48 hours then she should plan to return here for repeat evaluation and possible admission.  She agreed to do so. [STEVEN]      ED Course User Index  [STEVEN] Sumit Hansen MD              DIAGNOSIS  Final diagnoses:   Cellulitis of leg without foot, right         DISPOSITION  Home            Latest Documented Vital Signs:  As of 20:40 EDT  BP- (!) 166/107 HR- 87 Temp- 98.4 °F (36.9 °C) (Oral) O2 sat- 97%        --    Please note that portions of this were completed with a voice recognition program.          Sumit Hansen MD  09/21/22 2040    "

## 2022-09-22 NOTE — DISCHARGE INSTRUCTIONS
Take both antibiotics as directed.  Keep leg clean and well protected until it is well-healed.  Please return to the emergency room for any worsening pain, redness, swelling, fevers, drainage or any other concerns.

## 2022-10-18 ENCOUNTER — APPOINTMENT (OUTPATIENT)
Dept: CT IMAGING | Facility: HOSPITAL | Age: 67
End: 2022-10-18

## 2022-10-18 ENCOUNTER — APPOINTMENT (OUTPATIENT)
Dept: GENERAL RADIOLOGY | Facility: HOSPITAL | Age: 67
End: 2022-10-18

## 2022-10-18 ENCOUNTER — HOSPITAL ENCOUNTER (EMERGENCY)
Facility: HOSPITAL | Age: 67
Discharge: HOME | End: 2022-10-18
Attending: EMERGENCY MEDICINE | Admitting: EMERGENCY MEDICINE

## 2022-10-18 VITALS
WEIGHT: 252 LBS | SYSTOLIC BLOOD PRESSURE: 136 MMHG | HEART RATE: 72 BPM | RESPIRATION RATE: 18 BRPM | TEMPERATURE: 98 F | BODY MASS INDEX: 41.99 KG/M2 | OXYGEN SATURATION: 95 % | DIASTOLIC BLOOD PRESSURE: 73 MMHG | HEIGHT: 65 IN

## 2022-10-18 DIAGNOSIS — S00.81XA ABRASION OF CHEEK, INITIAL ENCOUNTER: ICD-10-CM

## 2022-10-18 DIAGNOSIS — S09.90XA CLOSED HEAD INJURY, INITIAL ENCOUNTER: Primary | ICD-10-CM

## 2022-10-18 DIAGNOSIS — S00.83XA CONTUSION OF FACE, INITIAL ENCOUNTER: ICD-10-CM

## 2022-10-18 DIAGNOSIS — S69.91XA INJURY OF FINGER OF RIGHT HAND, INITIAL ENCOUNTER: ICD-10-CM

## 2022-10-18 PROCEDURE — 99283 EMERGENCY DEPT VISIT LOW MDM: CPT

## 2022-10-18 PROCEDURE — 70486 CT MAXILLOFACIAL W/O DYE: CPT

## 2022-10-18 PROCEDURE — 70450 CT HEAD/BRAIN W/O DYE: CPT

## 2022-10-18 PROCEDURE — 73140 X-RAY EXAM OF FINGER(S): CPT

## 2022-10-18 RX ORDER — PRAMIPEXOLE DIHYDROCHLORIDE 0.5 MG/1
TABLET ORAL
COMMUNITY
Start: 2022-09-13

## 2022-10-18 NOTE — DISCHARGE INSTRUCTIONS
Please take Tylenol as needed for pain.  Please follow-up with your primary care physician.  Please return for reevaluation if you develop altered mental status, uncontrollable vomiting or for any other concerns.

## 2022-10-18 NOTE — ED TRIAGE NOTES
Pt to ED via [facility]. Pt with mechanical fall at 0615. Pt seen at [facility] and sent here for further eval. - LOC.     Pt c/o face, right knee, right shoulder, right 3rd finger pain.     Pt with abrasion to left cheek, chin and right knee.    Pt denies N/V, neck pain

## 2022-10-18 NOTE — ED PROVIDER NOTES
Subjective   History of Present Illness  67-year-old female presents with a complaint of mechanical fall this morning.  She landed on hands and knees to some degree but also on her left cheek.  She complains of right third finger pain, headache, left cheek pain.  Tells me that she cracked her tooth but that her teeth fit together normally.  No loss of consciousness no subsequent confusion, no change in vision no confusion no nausea or vomiting.  She has a global headache that is squeezing in nature, no radiation down the neck.  Her finger pain is in the right middle finger as an ache but does not radiate up the arm.  Denies neck pain denies back pain.  Says she is otherwise feeling well.  Review of Systems   All other systems reviewed and are negative.      Past Medical History:   Diagnosis Date   • Anemia    • DDD (degenerative disc disease), lumbar    • Diabetes mellitus (HCC)    • Disease of thyroid gland     HYPOTHYROID   • Fatigue    • GERD (gastroesophageal reflux disease)    • Heartburn    • Hiatal hernia    • Hypertension    • Joint pain    • RIVERA (obstructive sleep apnea)     CPAP   • RLS (restless legs syndrome)        Allergies   Allergen Reactions   • Morphine And Related Itching   • Sulfa Antibiotics Itching   • Toradol [Ketorolac Tromethamine] Itching   • Adhesive Tape Rash       Past Surgical History:   Procedure Laterality Date   •  SECTION     • COLONOSCOPY     • DILATATION AND CURETTAGE     • ENDOSCOPY     • GASTRIC SLEEVE LAPAROSCOPIC N/A 2017    Procedure: GASTRIC SLEEVE LAPAROSCOPIC WITH HIATAL HERNIA REPIAR, LYSIS OF ADHESIONS;  Surgeon: Job Douglass Jr., MD;  [place] [facility] [place] [facility] [place]  Service:    • HYSTERECTOMY     • KNEE ARTHROSCOPY     • LAPAROSCOPIC CHOLECYSTECTOMY     • SKIN GRAFT     • TONSILLECTOMY     • TUBAL ABDOMINAL LIGATION         Family History   Problem Relation Age of Onset   • Obesity Mother    • Diabetes Mother    • Hypertension Mother    • Sleep apnea Mother     • Hypertension Father    • Stroke Father    • Heart disease Father    • Hypertension Brother    • Heart attack Brother    • Heart disease Brother    • Sleep apnea Brother    • Cancer Brother    • Heart attack Paternal Grandmother    • Breast cancer Maternal Aunt        Social History     Socioeconomic History   • Marital status:    • Number of children: 3   Tobacco Use   • Smoking status: Never   • Smokeless tobacco: Never   • Tobacco comments:     daily caffine   Substance and Sexual Activity   • Alcohol use: No   • Drug use: No   • Sexual activity: Defer           Objective    ED Triage Vitals [10/18/22 1106]   Temp Heart Rate Resp BP SpO2   98 °F (36.7 °C) 69 18 154/97 96 %      Temp src Heart Rate Source Patient Position BP Location FiO2 (%)   Oral -- -- -- --       Physical Exam  INITIAL VITAL SIGNS: Reviewed by me.  Pulse ox [normal]  GENERAL:  Alert. Well developed and well nourished. No apparent or acute respiratory distress  HEAD:  Head is normocephalic.  There is swelling and tenderness to the left maxillary area, no ambriz sign no raccoon eyes  EYES: EOMI. PERRLA. No scleral icterus. No conjunctival injection  ENT:  Nose nontender.  No septal hematoma. Nasopharynx is clear of exudates and erythema. Oropharynx is clear of exudate and erythema. No dental, lip, or tongue injury  NECK:  Supple.  Full range of motion. No cervical spine tenderness to palpation. No cervical spine bony step-offs or crepitus to palpation   RESPIRATORY:  No tachypnea. Clear to auscultation bilaterally. No wheezing, rales, or rhonchi.  No chest wall tenderness or crepitus  CV: Regular rate and rhythm. No murmurs, rubs, or gallops, 2+ radial pulses and dorsalis pedis pulses bilaterally, Capillary refill normal in all 4 extremities  ABDOMEN:  Soft, nondistended, nontender; No guarding; No rebound; No masses.   EXTREMITIES: Right middle finger is mildly swollen, mildly tender to palpation but she has good flexion extension of  the finger and I do not appreciate any significant deformity there is no ecchymosis.  BACK:  No midline tenderness. No ecchymoses. No evidence of trauma.  SKIN: Warm and dry. No diaphoresis. No abrasions, lacerations or ecchymoses seen.   NEUROLOGIC:  Alert and oriented x 3. Appropriate. Face is symmetric. Speech is normal. Moves all extremities equally    CT Head Without Contrast    Result Date: 10/18/2022  CT Head WO HISTORY: Fall today with trauma to left side of head TECHNIQUE: Axial unenhanced head CT. Radiation dose reduction techniques included automated exposure control or exposure modulation based on body size. Count of known CT and cardiac nuc med studies performed in previous 12 months: 0. Time of scan: 1:07 PM COMPARISON: None. FINDINGS: No intracranial hemorrhage, mass, or infarct. No hydrocephalus or extra-axial fluid collection. Brain parenchymal density is normal. The skull base, calvarium, and extracranial soft tissues are normal. Mastoid air cells are clear. Visualized paranasal sinuses are clear. No acute orbital findings. Status post lens replacement surgery.     No acute intracranial findings. Signer Name: Jc Chavarria MD  Signed: 10/18/2022 2:09 PM  Workstation Name: [id number]  [facility]    XR Finger 2+ View Right    Result Date: 10/18/2022  XR FINGER 2+ VW RIGHT-: 10/18/2022 1:14 PM  INDICATION: Fell this morning. Pain middle finger..  COMPARISON: None available.  FINDINGS: 3 views of the right middle finger.  No fracture or dislocation.  No bone erosion or destruction.   No foreign body. There are degenerative changes. No focal erosive change.       1. Degenerative changes. No acute fracture..  This report was finalized on 10/18/2022 1:24 PM by Dr. Sumit Damian MD.      CT Facial Bones Without Contrast    Result Date: 10/18/2022  CT Max Facial Area WO INDICATION: Facial swelling and pain TECHNIQUE: Maxillofacial CT without IV contrast. Coronal and sagittal  reconstructions were obtained.  Radiation dose reduction techniques included automated exposure control or exposure modulation based on body size. Count of known CT and cardiac nuc med studies performed in previous 12 months: 0. COMPARISON:  None available. FINDINGS: The mandible and maxilla are intact. The nasal bone shows no evidence of fracture.The walls of the maxillary sinuses are intact. No orbital wall fractures are seen. The walls of the frontal sinus are intact. No air-fluid levels are identified. The zygomatic arches are intact.     No acute traumatic findings. Signer Name: Jc Chavarria MD  Signed: 10/18/2022 2:11 PM  Workstation Name: [id number]  [facility]        Procedures           ED Course  ED Course as of 10/18/22 1442   Tue Oct 18, 2022   1241 Pleasant 67-year-old lady with mechanical fall, based on her symptoms she needs a head CT, facial CT get some x-rays of her finger.  She is asking if we can use contrast with the CTs to increase their sensitivity, I had a discussion with her that when looking for intracranial hemorrhage and facial fractures using contrast is not indicated and will not help the picture.   [RO]   1418 CT scans are negative for acute injuries, finger is negative.  Abrasion on face has been washed and there is no laceration need to be repaired.  Will discharge home encouraged her to follow-up with a dentist. [RO]      ED Course User Index  [RO] Cruz Taylor MD                                           [facility]    Final diagnoses:   Closed head injury, initial encounter   Contusion of face, initial encounter   Injury of finger of right hand, initial encounter   Abrasion of cheek, initial encounter       ED Disposition  ED Disposition     ED Disposition   Discharge    Condition   Good    Comment   --             Lesly Anthony, NP-C  [address] [facility] [place] [address] [phone]    Call   To schedule follow-up appointment         Medication List       No changes were made to your prescriptions during this visit.          Cruz Taylor MD  10/18/22 1489

## 2022-11-12 ENCOUNTER — APPOINTMENT (OUTPATIENT)
Dept: GENERAL RADIOLOGY | Facility: HOSPITAL | Age: 67
End: 2022-11-12

## 2022-11-12 ENCOUNTER — HOSPITAL ENCOUNTER (EMERGENCY)
Facility: HOSPITAL | Age: 67
Discharge: HOME | End: 2022-11-12
Attending: EMERGENCY MEDICINE | Admitting: EMERGENCY MEDICINE

## 2022-11-12 VITALS
SYSTOLIC BLOOD PRESSURE: 132 MMHG | HEART RATE: 72 BPM | OXYGEN SATURATION: 95 % | TEMPERATURE: 98 F | DIASTOLIC BLOOD PRESSURE: 85 MMHG | RESPIRATION RATE: 18 BRPM | WEIGHT: 255 LBS | BODY MASS INDEX: 42.49 KG/M2 | HEIGHT: 65 IN

## 2022-11-12 DIAGNOSIS — L03.115 CELLULITIS OF RIGHT LEG: Primary | ICD-10-CM

## 2022-11-12 LAB
ANION GAP SERPL CALCULATED.3IONS-SCNC: 10.7 MMOL/L (ref 5–15)
BASOPHILS # BLD AUTO: 0.06 10*3/MM3 (ref 0–0.2)
BASOPHILS NFR BLD AUTO: 0.6 % (ref 0–1.5)
BUN SERPL-MCNC: 7 MG/DL (ref 8–23)
BUN/CREAT SERPL: 10.8 (ref 7–25)
CALCIUM SPEC-SCNC: 9 MG/DL (ref 8.6–10.5)
CHLORIDE SERPL-SCNC: 106 MMOL/L (ref 98–107)
CO2 SERPL-SCNC: 24.3 MMOL/L (ref 22–29)
CREAT SERPL-MCNC: 0.65 MG/DL (ref 0.57–1)
DEPRECATED RDW RBC AUTO: 43.1 FL (ref 37–54)
EGFRCR SERPLBLD CKD-EPI 2021: 97.8 ML/MIN/1.73
EOSINOPHIL # BLD AUTO: 0.13 10*3/MM3 (ref 0–0.4)
EOSINOPHIL NFR BLD AUTO: 1.3 % (ref 0.3–6.2)
ERYTHROCYTE [DISTWIDTH] IN BLOOD BY AUTOMATED COUNT: 12.6 % (ref 12.3–15.4)
GLUCOSE SERPL-MCNC: 135 MG/DL (ref 65–99)
HCT VFR BLD AUTO: 43.2 % (ref 34–46.6)
HGB BLD-MCNC: 13.9 G/DL (ref 12–15.9)
IMM GRANULOCYTES # BLD AUTO: 0.04 10*3/MM3 (ref 0–0.05)
IMM GRANULOCYTES NFR BLD AUTO: 0.4 % (ref 0–0.5)
LYMPHOCYTES # BLD AUTO: 2.24 10*3/MM3 (ref 0.7–3.1)
LYMPHOCYTES NFR BLD AUTO: 23.1 % (ref 19.6–45.3)
MCH RBC QN AUTO: 30.3 PG (ref 26.6–33)
MCHC RBC AUTO-ENTMCNC: 32.2 G/DL (ref 31.5–35.7)
MCV RBC AUTO: 94.1 FL (ref 79–97)
MONOCYTES # BLD AUTO: 0.85 10*3/MM3 (ref 0.1–0.9)
MONOCYTES NFR BLD AUTO: 8.8 % (ref 5–12)
NEUTROPHILS NFR BLD AUTO: 6.38 10*3/MM3 (ref 1.7–7)
NEUTROPHILS NFR BLD AUTO: 65.8 % (ref 42.7–76)
NRBC BLD AUTO-RTO: 0 /100 WBC (ref 0–0.2)
PLATELET # BLD AUTO: 255 10*3/MM3 (ref 140–450)
PMV BLD AUTO: 10.4 FL (ref 6–12)
POTASSIUM SERPL-SCNC: 3.5 MMOL/L (ref 3.5–5.2)
RBC # BLD AUTO: 4.59 10*6/MM3 (ref 3.77–5.28)
SODIUM SERPL-SCNC: 141 MMOL/L (ref 136–145)
WBC NRBC COR # BLD: 9.7 10*3/MM3 (ref 3.4–10.8)

## 2022-11-12 PROCEDURE — 99283 EMERGENCY DEPT VISIT LOW MDM: CPT

## 2022-11-12 PROCEDURE — 96365 THER/PROPH/DIAG IV INF INIT: CPT

## 2022-11-12 PROCEDURE — 73590 X-RAY EXAM OF LOWER LEG: CPT

## 2022-11-12 PROCEDURE — 85025 COMPLETE CBC W/AUTO DIFF WBC: CPT | Performed by: EMERGENCY MEDICINE

## 2022-11-12 PROCEDURE — 80048 BASIC METABOLIC PNL TOTAL CA: CPT | Performed by: EMERGENCY MEDICINE

## 2022-11-12 RX ORDER — DOXYCYCLINE 100 MG/1
100 CAPSULE ORAL 2 TIMES DAILY
Qty: 20 CAPSULE | Refills: 0 | Status: SHIPPED | OUTPATIENT
Start: 2022-11-12

## 2022-11-12 RX ORDER — DOXYCYCLINE 100 MG/10ML
INJECTION, POWDER, LYOPHILIZED, FOR SOLUTION INTRAVENOUS
Status: DISCONTINUED
Start: 2022-11-12 | End: 2022-11-13 | Stop reason: HOSPADM

## 2022-11-12 RX ORDER — SODIUM CHLORIDE 9 MG/ML
INJECTION, SOLUTION INTRAVENOUS
Status: DISCONTINUED
Start: 2022-11-12 | End: 2022-11-13 | Stop reason: HOSPADM

## 2022-11-12 RX ADMIN — DOXYCYCLINE 100 MG: 100 INJECTION, POWDER, LYOPHILIZED, FOR SOLUTION INTRAVENOUS at 20:09

## 2022-11-13 NOTE — ED PROVIDER NOTES
Subjective   History of Present Illness  History of Present Illness    Chief complaint: Redness    Location: Right leg    Quality/Severity: Anterior    Timing/Onset/Duration: Developed since Tuesday    Modifying Factors: Nothing makes it better, patient using hydroperoxide on a wound on the lower    Associated Symptoms: No fever or chills.  No numbness, tingling, or weakness.    Narrative: This 67-year-old white female presents with a reddened right leg.  She sustained a wound to the leg 1 Tuesday after mechanical fall.  Her tetanus is up-to-date.  She has had trouble with cellulitis in the leg in the past.    PCP:Raj April, NP-C  Review of Systems   Constitutional: Negative for chills and fever.   Musculoskeletal:        Wound right leg with redness   Skin: Positive for wound.   Neurological: Negative for weakness and numbness.        Medication List      ASK your doctor about these medications    (No Medication Selected)     Actemra 80 MG/4ML solution injection  Generic drug: tocilizumab     benzonatate 200 MG capsule  Commonly known as: TESSALON  Take 1 capsule by mouth 3 (Three) Times a Day As Needed for Cough.     buPROPion  MG 24 hr tablet  Commonly known as: WELLBUTRIN XL     cetirizine 10 MG tablet  Commonly known as: zyrTEC     Durolane 60 MG/3ML prefilled syringe injection  Generic drug: Sodium Hyaluronate     fluconazole 150 MG tablet  Commonly known as: DIFLUCAN  Take 1 tablet p.o. on September 23, 2022.  Take second tablet p.o. on September 26, 2022     hydroxychloroquine 200 MG tablet  Commonly known as: PLAQUENIL     levothyroxine 75 MCG tablet  Commonly known as: SYNTHROID, LEVOTHROID     multivitamin with minerals tablet tablet     potassium chloride 20 MEQ CR tablet  Commonly known as: K-DUR,KLOR-CON     pramipexole 0.5 MG tablet  Commonly known as: MIRAPEX     promethazine-dextromethorphan 6.25-15 MG/5ML syrup  Commonly known as: PROMETHAZINE-DM  Take 5 mL by mouth 4 (Four) Times a Day As  Needed for Cough.     SLOW-MAG PO     traZODone 50 MG tablet  Commonly known as: DESYREL     Tylenol PM Extra Strength  MG tablet per tablet  Generic drug: diphenhydrAMINE-acetaminophen     vitamin D3 125 MCG (5000 UT) capsule capsule            Past Medical History:   Diagnosis Date   • Anemia    • DDD (degenerative disc disease), lumbar    • Diabetes mellitus (HCC)    • Disease of thyroid gland     HYPOTHYROID   • Fatigue    • GERD (gastroesophageal reflux disease)    • Heartburn    • Hiatal hernia    • Hypertension    • Joint pain    • RIVERA (obstructive sleep apnea)     CPAP   • RLS (restless legs syndrome)        Allergies   Allergen Reactions   • Morphine And Related Itching   • Sulfa Antibiotics Itching   • Toradol [Ketorolac Tromethamine] Itching   • Adhesive Tape Rash       Past Surgical History:   Procedure Laterality Date   •  SECTION     • COLONOSCOPY     • DILATATION AND CURETTAGE     • ENDOSCOPY     • GASTRIC SLEEVE LAPAROSCOPIC N/A 2017    Procedure: GASTRIC SLEEVE LAPAROSCOPIC WITH HIATAL HERNIA REPIAR, LYSIS OF ADHESIONS;  Surgeon: Job Douglass Jr., MD;  [place] [facility] [place] [facility] [place]  Service:    • HYSTERECTOMY     • KNEE ARTHROSCOPY     • LAPAROSCOPIC CHOLECYSTECTOMY     • SKIN GRAFT     • TONSILLECTOMY     • TUBAL ABDOMINAL LIGATION         Family History   Problem Relation Age of Onset   • Obesity Mother    • Diabetes Mother    • Hypertension Mother    • Sleep apnea Mother    • Hypertension Father    • Stroke Father    • Heart disease Father    • Hypertension Brother    • Heart attack Brother    • Heart disease Brother    • Sleep apnea Brother    • Cancer Brother    • Heart attack Paternal Grandmother    • Breast cancer Maternal Aunt        Social History     Socioeconomic History   • Marital status:    • Number of children: 3   Tobacco Use   • Smoking status: Never   • Smokeless tobacco: Never   • Tobacco comments:     daily caffine   Substance and Sexual Activity   •  Alcohol use: No   • Drug use: No   • Sexual activity: Defer           Objective   Physical Exam  Nursing note reviewed. Vitals reviewed: The temperature is 98 °F, pulse 89, respirations 18, /82, room air pulse ox 97%   Constitutional:       Appearance: Normal appearance.   Musculoskeletal:      Comments: There is redness of the anterior portion of the right lower extremity with a laceration is healing by secondary intention.  The capillary refills less than 2 seconds.  The sensation is intact.  There is a normal range of motion noted.  There is no joint laxity noted.  There is 2+ dorsalis pedis pulse.   Skin:     General: Skin is warm and dry.      Capillary Refill: Capillary refill takes less than 2 seconds.      Findings: Rash present.   Neurological:      General: No focal deficit present.      Mental Status: She is alert and oriented to person, place, and time.      Sensory: No sensory deficit.      Motor: No weakness.         Procedures           ED Course  ED Course as of 11/12/22 1952   Sat Nov 12, 2022 1929 The CBC is unremarkable [RC]   1948 The BMP is unremarkable [RC]      ED Course User Index  [RC] Tay Looney MD      19:52 EST, 11/12/22: The patient's diagnosis of cellulitis of the right leg was discussed with her  The area of erythema was highlighted.  The patient was reassessed.  She has no new complaints.  She will be given doxycycline IV.  The patient will be sent prescription for oral doxycycline electronically to her pharmacy.  The patient should follow-up with Mirna Anthony, her PCP, on Monday for recheck.  She should return to the emergency department if there is worsening pain, fever, numbness, tingling, weakness, change in color or temperature, worsening way at all.  All the patient's question were answered she will be discharged in good condition                                     MDM    Final diagnoses:   Cellulitis of right leg       ED Disposition  ED Disposition     None           No follow-up provider specified.       Medication List      No changes were made to your prescriptions during this visit.          Tay Looney MD  11/13/22 0054       Tay Looney MD  11/13/22 0055

## 2022-11-13 NOTE — DISCHARGE INSTRUCTIONS
Take Tylenol or ibuprofen as needed as directed for pain.  Take the doxycycline as prescribed.  Follow-up with your primary care provider for recheck on Monday.  Return to the emergency department there is increased pain, redness, fever, chills, numbness, tingling, weakness, change in color or temperature, worse in any way at all.

## 2022-12-08 ENCOUNTER — APPOINTMENT (OUTPATIENT)
Dept: CARDIOLOGY | Facility: HOSPITAL | Age: 67
End: 2022-12-08

## 2022-12-08 ENCOUNTER — HOSPITAL ENCOUNTER (OUTPATIENT)
Facility: HOSPITAL | Age: 67
Setting detail: OBSERVATION
Discharge: HOME | End: 2022-12-10
Attending: EMERGENCY MEDICINE | Admitting: INTERNAL MEDICINE

## 2022-12-08 DIAGNOSIS — L03.116 BILATERAL LOWER LEG CELLULITIS: Primary | ICD-10-CM

## 2022-12-08 DIAGNOSIS — L03.115 BILATERAL LOWER LEG CELLULITIS: Primary | ICD-10-CM

## 2022-12-08 PROBLEM — G62.9 NEUROPATHY: Status: ACTIVE | Noted: 2022-12-08

## 2022-12-08 PROBLEM — R19.7 DIARRHEA OF PRESUMED INFECTIOUS ORIGIN: Status: ACTIVE | Noted: 2022-12-08

## 2022-12-08 PROBLEM — L03.90 CELLULITIS: Status: ACTIVE | Noted: 2022-12-08

## 2022-12-08 PROBLEM — T14.8XXA WOUND INFECTION: Status: ACTIVE | Noted: 2022-12-08

## 2022-12-08 PROBLEM — L08.9 WOUND INFECTION: Status: ACTIVE | Noted: 2022-12-08

## 2022-12-08 LAB
ALBUMIN SERPL-MCNC: 3.7 G/DL (ref 3.5–5.2)
ALBUMIN/GLOB SERPL: 1.5 G/DL
ALP SERPL-CCNC: 72 U/L (ref 39–117)
ALT SERPL W P-5'-P-CCNC: 9 U/L (ref 1–33)
ANION GAP SERPL CALCULATED.3IONS-SCNC: 7.7 MMOL/L (ref 5–15)
AST SERPL-CCNC: 11 U/L (ref 1–32)
BASOPHILS # BLD AUTO: 0.06 10*3/MM3 (ref 0–0.2)
BASOPHILS NFR BLD AUTO: 0.8 % (ref 0–1.5)
BH CV LOW VAS LEFT GREAT SAPH AK CM FIELD: 2.23 CM
BH CV LOW VAS LEFT GREATER SAPH AK VESSEL: 1
BH CV LOWER VASCULAR LEFT COMMON FEMORAL AUGMENT: NORMAL
BH CV LOWER VASCULAR LEFT COMMON FEMORAL COMPETENT: NORMAL
BH CV LOWER VASCULAR LEFT COMMON FEMORAL COMPRESS: NORMAL
BH CV LOWER VASCULAR LEFT COMMON FEMORAL PHASIC: NORMAL
BH CV LOWER VASCULAR LEFT COMMON FEMORAL SPONT: NORMAL
BH CV LOWER VASCULAR LEFT DISTAL FEMORAL COMPRESS: NORMAL
BH CV LOWER VASCULAR LEFT GASTRONEMIUS COMPRESS: NORMAL
BH CV LOWER VASCULAR LEFT GREATER SAPH AK COMPRESS: NORMAL
BH CV LOWER VASCULAR LEFT GREATER SAPH BK COMPRESS: NORMAL
BH CV LOWER VASCULAR LEFT LESSER SAPH COMPRESS: NORMAL
BH CV LOWER VASCULAR LEFT MID FEMORAL AUGMENT: NORMAL
BH CV LOWER VASCULAR LEFT MID FEMORAL COMPETENT: NORMAL
BH CV LOWER VASCULAR LEFT MID FEMORAL COMPRESS: NORMAL
BH CV LOWER VASCULAR LEFT MID FEMORAL PHASIC: NORMAL
BH CV LOWER VASCULAR LEFT MID FEMORAL SPONT: NORMAL
BH CV LOWER VASCULAR LEFT PERONEAL COMPRESS: NORMAL
BH CV LOWER VASCULAR LEFT POPLITEAL AUGMENT: NORMAL
BH CV LOWER VASCULAR LEFT POPLITEAL COMPETENT: NORMAL
BH CV LOWER VASCULAR LEFT POPLITEAL COMPRESS: NORMAL
BH CV LOWER VASCULAR LEFT POPLITEAL PHASIC: NORMAL
BH CV LOWER VASCULAR LEFT POPLITEAL SPONT: NORMAL
BH CV LOWER VASCULAR LEFT POSTERIOR TIBIAL COMPRESS: NORMAL
BH CV LOWER VASCULAR LEFT PROFUNDA FEMORAL COMPRESS: NORMAL
BH CV LOWER VASCULAR LEFT PROXIMAL FEMORAL COMPRESS: NORMAL
BH CV LOWER VASCULAR LEFT SAPHENOFEMORAL JUNCTION COMPRESS: NORMAL
BH CV LOWER VASCULAR RIGHT COMMON FEMORAL AUGMENT: NORMAL
BH CV LOWER VASCULAR RIGHT COMMON FEMORAL COMPETENT: NORMAL
BH CV LOWER VASCULAR RIGHT COMMON FEMORAL COMPRESS: NORMAL
BH CV LOWER VASCULAR RIGHT COMMON FEMORAL PHASIC: NORMAL
BH CV LOWER VASCULAR RIGHT COMMON FEMORAL SPONT: NORMAL
BH CV LOWER VASCULAR RIGHT DISTAL FEMORAL COMPRESS: NORMAL
BH CV LOWER VASCULAR RIGHT GASTRONEMIUS COMPRESS: NORMAL
BH CV LOWER VASCULAR RIGHT GREATER SAPH AK COMPRESS: NORMAL
BH CV LOWER VASCULAR RIGHT GREATER SAPH BK COMPRESS: NORMAL
BH CV LOWER VASCULAR RIGHT LESSER SAPH COMPRESS: NORMAL
BH CV LOWER VASCULAR RIGHT MID FEMORAL AUGMENT: NORMAL
BH CV LOWER VASCULAR RIGHT MID FEMORAL COMPETENT: NORMAL
BH CV LOWER VASCULAR RIGHT MID FEMORAL COMPRESS: NORMAL
BH CV LOWER VASCULAR RIGHT MID FEMORAL PHASIC: NORMAL
BH CV LOWER VASCULAR RIGHT MID FEMORAL SPONT: NORMAL
BH CV LOWER VASCULAR RIGHT PERONEAL COMPRESS: NORMAL
BH CV LOWER VASCULAR RIGHT POPLITEAL AUGMENT: NORMAL
BH CV LOWER VASCULAR RIGHT POPLITEAL COMPETENT: NORMAL
BH CV LOWER VASCULAR RIGHT POPLITEAL COMPRESS: NORMAL
BH CV LOWER VASCULAR RIGHT POPLITEAL PHASIC: NORMAL
BH CV LOWER VASCULAR RIGHT POPLITEAL SPONT: NORMAL
BH CV LOWER VASCULAR RIGHT POSTERIOR TIBIAL COMPRESS: NORMAL
BH CV LOWER VASCULAR RIGHT PROFUNDA FEMORAL COMPRESS: NORMAL
BH CV LOWER VASCULAR RIGHT PROXIMAL FEMORAL COMPRESS: NORMAL
BH CV LOWER VASCULAR RIGHT SAPHENOFEMORAL JUNCTION COMPRESS: NORMAL
BILIRUB SERPL-MCNC: 0.6 MG/DL (ref 0–1.2)
BUN SERPL-MCNC: 9 MG/DL (ref 8–23)
BUN/CREAT SERPL: 12.7 (ref 7–25)
CALCIUM SPEC-SCNC: 8.9 MG/DL (ref 8.6–10.5)
CHLORIDE SERPL-SCNC: 105 MMOL/L (ref 98–107)
CO2 SERPL-SCNC: 28.3 MMOL/L (ref 22–29)
CREAT SERPL-MCNC: 0.71 MG/DL (ref 0.57–1)
CRP SERPL-MCNC: 2.13 MG/DL (ref 0–0.5)
D-LACTATE SERPL-SCNC: 1.2 MMOL/L (ref 0.5–2)
DEPRECATED RDW RBC AUTO: 40.8 FL (ref 37–54)
EGFRCR SERPLBLD CKD-EPI 2021: 94.5 ML/MIN/1.73
EOSINOPHIL # BLD AUTO: 0.11 10*3/MM3 (ref 0–0.4)
EOSINOPHIL NFR BLD AUTO: 1.5 % (ref 0.3–6.2)
ERYTHROCYTE [DISTWIDTH] IN BLOOD BY AUTOMATED COUNT: 12.2 % (ref 12.3–15.4)
GLOBULIN UR ELPH-MCNC: 2.5 GM/DL
GLUCOSE SERPL-MCNC: 84 MG/DL (ref 65–99)
HCT VFR BLD AUTO: 39 % (ref 34–46.6)
HGB BLD-MCNC: 12.9 G/DL (ref 12–15.9)
IMM GRANULOCYTES # BLD AUTO: 0.03 10*3/MM3 (ref 0–0.05)
IMM GRANULOCYTES NFR BLD AUTO: 0.4 % (ref 0–0.5)
LYMPHOCYTES # BLD AUTO: 1.42 10*3/MM3 (ref 0.7–3.1)
LYMPHOCYTES NFR BLD AUTO: 18.9 % (ref 19.6–45.3)
MAXIMAL PREDICTED HEART RATE: 155 BPM
MCH RBC QN AUTO: 30.2 PG (ref 26.6–33)
MCHC RBC AUTO-ENTMCNC: 33.1 G/DL (ref 31.5–35.7)
MCV RBC AUTO: 91.3 FL (ref 79–97)
MONOCYTES # BLD AUTO: 0.75 10*3/MM3 (ref 0.1–0.9)
MONOCYTES NFR BLD AUTO: 10 % (ref 5–12)
NEUTROPHILS NFR BLD AUTO: 5.13 10*3/MM3 (ref 1.7–7)
NEUTROPHILS NFR BLD AUTO: 68.4 % (ref 42.7–76)
NRBC BLD AUTO-RTO: 0 /100 WBC (ref 0–0.2)
PLATELET # BLD AUTO: 236 10*3/MM3 (ref 140–450)
PMV BLD AUTO: 10.4 FL (ref 6–12)
POTASSIUM SERPL-SCNC: 3.7 MMOL/L (ref 3.5–5.2)
PROT SERPL-MCNC: 6.2 G/DL (ref 6–8.5)
QT INTERVAL: 378 MS
RBC # BLD AUTO: 4.27 10*6/MM3 (ref 3.77–5.28)
SODIUM SERPL-SCNC: 141 MMOL/L (ref 136–145)
STRESS TARGET HR: 132 BPM
TSH SERPL DL<=0.05 MIU/L-ACNC: 2.18 UIU/ML (ref 0.27–4.2)
WBC NRBC COR # BLD: 7.5 10*3/MM3 (ref 3.4–10.8)

## 2022-12-08 PROCEDURE — 93010 ELECTROCARDIOGRAM REPORT: CPT | Performed by: INTERNAL MEDICINE

## 2022-12-08 PROCEDURE — 25010000002 PERFLUTREN (DEFINITY) 8.476 MG IN SODIUM CHLORIDE (PF) 0.9 % 10 ML INJECTION: Performed by: NURSE PRACTITIONER

## 2022-12-08 PROCEDURE — 96365 THER/PROPH/DIAG IV INF INIT: CPT

## 2022-12-08 PROCEDURE — 87205 SMEAR GRAM STAIN: CPT | Performed by: EMERGENCY MEDICINE

## 2022-12-08 PROCEDURE — 96366 THER/PROPH/DIAG IV INF ADDON: CPT

## 2022-12-08 PROCEDURE — 25010000002 VANCOMYCIN PER 500 MG: Performed by: NURSE PRACTITIONER

## 2022-12-08 PROCEDURE — 25010000002 VANCOMYCIN 10 G RECONSTITUTED SOLUTION: Performed by: EMERGENCY MEDICINE

## 2022-12-08 PROCEDURE — 96372 THER/PROPH/DIAG INJ SC/IM: CPT

## 2022-12-08 PROCEDURE — 87077 CULTURE AEROBIC IDENTIFY: CPT | Performed by: EMERGENCY MEDICINE

## 2022-12-08 PROCEDURE — G0378 HOSPITAL OBSERVATION PER HR: HCPCS

## 2022-12-08 PROCEDURE — 80050 GENERAL HEALTH PANEL: CPT | Performed by: EMERGENCY MEDICINE

## 2022-12-08 PROCEDURE — 93970 EXTREMITY STUDY: CPT

## 2022-12-08 PROCEDURE — 99284 EMERGENCY DEPT VISIT MOD MDM: CPT

## 2022-12-08 PROCEDURE — 93306 TTE W/DOPPLER COMPLETE: CPT

## 2022-12-08 PROCEDURE — 93005 ELECTROCARDIOGRAM TRACING: CPT | Performed by: EMERGENCY MEDICINE

## 2022-12-08 PROCEDURE — 87186 SC STD MICRODIL/AGAR DIL: CPT | Performed by: EMERGENCY MEDICINE

## 2022-12-08 PROCEDURE — 25010000002 HEPARIN (PORCINE) PER 1000 UNITS: Performed by: NURSE PRACTITIONER

## 2022-12-08 PROCEDURE — 25010000002 HEPARIN (PORCINE) PER 1000 UNITS

## 2022-12-08 PROCEDURE — 87070 CULTURE OTHR SPECIMN AEROBIC: CPT | Performed by: EMERGENCY MEDICINE

## 2022-12-08 PROCEDURE — 83605 ASSAY OF LACTIC ACID: CPT | Performed by: EMERGENCY MEDICINE

## 2022-12-08 PROCEDURE — 25010000002 CEFTRIAXONE PER 250 MG: Performed by: INTERNAL MEDICINE

## 2022-12-08 PROCEDURE — 87040 BLOOD CULTURE FOR BACTERIA: CPT | Performed by: EMERGENCY MEDICINE

## 2022-12-08 PROCEDURE — 93306 TTE W/DOPPLER COMPLETE: CPT | Performed by: INTERNAL MEDICINE

## 2022-12-08 PROCEDURE — 97602 WOUND(S) CARE NON-SELECTIVE: CPT

## 2022-12-08 PROCEDURE — 86140 C-REACTIVE PROTEIN: CPT | Performed by: NURSE PRACTITIONER

## 2022-12-08 RX ORDER — TRAZODONE HYDROCHLORIDE 50 MG/1
TABLET ORAL
Status: DISPENSED
Start: 2022-12-08 | End: 2022-12-08

## 2022-12-08 RX ORDER — SODIUM CHLORIDE 0.9 % (FLUSH) 0.9 %
10 SYRINGE (ML) INJECTION AS NEEDED
Status: DISCONTINUED | OUTPATIENT
Start: 2022-12-08 | End: 2022-12-10 | Stop reason: HOSPADM

## 2022-12-08 RX ORDER — HYDROCODONE BITARTRATE AND ACETAMINOPHEN 5; 325 MG/1; MG/1
1 TABLET ORAL EVERY 4 HOURS PRN
Status: DISCONTINUED | OUTPATIENT
Start: 2022-12-08 | End: 2022-12-08

## 2022-12-08 RX ORDER — HYDROCODONE BITARTRATE AND ACETAMINOPHEN 5; 325 MG/1; MG/1
1 TABLET ORAL EVERY 6 HOURS PRN
Status: DISCONTINUED | OUTPATIENT
Start: 2022-12-08 | End: 2022-12-10 | Stop reason: HOSPADM

## 2022-12-08 RX ORDER — HYDROXYCHLOROQUINE SULFATE 200 MG/1
200 TABLET, FILM COATED ORAL DAILY
Status: DISCONTINUED | OUTPATIENT
Start: 2022-12-08 | End: 2022-12-10 | Stop reason: HOSPADM

## 2022-12-08 RX ORDER — BISACODYL 10 MG
10 SUPPOSITORY, RECTAL RECTAL DAILY PRN
Status: DISCONTINUED | OUTPATIENT
Start: 2022-12-08 | End: 2022-12-10 | Stop reason: HOSPADM

## 2022-12-08 RX ORDER — DEXTROSE MONOHYDRATE 25 G/50ML
25 INJECTION, SOLUTION INTRAVENOUS
Status: DISCONTINUED | OUTPATIENT
Start: 2022-12-08 | End: 2022-12-10 | Stop reason: HOSPADM

## 2022-12-08 RX ORDER — POLYETHYLENE GLYCOL 3350 17 G/17G
17 POWDER, FOR SOLUTION ORAL DAILY PRN
Status: DISCONTINUED | OUTPATIENT
Start: 2022-12-08 | End: 2022-12-10 | Stop reason: HOSPADM

## 2022-12-08 RX ORDER — ACETAMINOPHEN 325 MG/1
TABLET ORAL
Status: COMPLETED
Start: 2022-12-08 | End: 2022-12-08

## 2022-12-08 RX ORDER — ACETAMINOPHEN 650 MG/1
650 SUPPOSITORY RECTAL EVERY 4 HOURS PRN
Status: DISCONTINUED | OUTPATIENT
Start: 2022-12-08 | End: 2022-12-10 | Stop reason: HOSPADM

## 2022-12-08 RX ORDER — AMOXICILLIN 250 MG
2 CAPSULE ORAL 2 TIMES DAILY PRN
Status: DISCONTINUED | OUTPATIENT
Start: 2022-12-08 | End: 2022-12-10 | Stop reason: HOSPADM

## 2022-12-08 RX ORDER — ACETAMINOPHEN 325 MG/1
650 TABLET ORAL EVERY 4 HOURS PRN
Status: DISCONTINUED | OUTPATIENT
Start: 2022-12-08 | End: 2022-12-10 | Stop reason: HOSPADM

## 2022-12-08 RX ORDER — ONDANSETRON 2 MG/ML
4 INJECTION INTRAMUSCULAR; INTRAVENOUS EVERY 6 HOURS PRN
Status: DISCONTINUED | OUTPATIENT
Start: 2022-12-08 | End: 2022-12-10 | Stop reason: HOSPADM

## 2022-12-08 RX ORDER — SODIUM CHLORIDE 9 MG/ML
40 INJECTION, SOLUTION INTRAVENOUS AS NEEDED
Status: DISCONTINUED | OUTPATIENT
Start: 2022-12-08 | End: 2022-12-10 | Stop reason: HOSPADM

## 2022-12-08 RX ORDER — TRAZODONE HYDROCHLORIDE 50 MG/1
50 TABLET ORAL DAILY
Status: DISCONTINUED | OUTPATIENT
Start: 2022-12-08 | End: 2022-12-08

## 2022-12-08 RX ORDER — ACETAMINOPHEN 160 MG/5ML
650 SOLUTION ORAL EVERY 4 HOURS PRN
Status: DISCONTINUED | OUTPATIENT
Start: 2022-12-08 | End: 2022-12-10 | Stop reason: HOSPADM

## 2022-12-08 RX ORDER — PRAMIPEXOLE DIHYDROCHLORIDE 0.5 MG/1
0.5 TABLET ORAL NIGHTLY
Status: DISCONTINUED | OUTPATIENT
Start: 2022-12-08 | End: 2022-12-10 | Stop reason: HOSPADM

## 2022-12-08 RX ORDER — NICOTINE POLACRILEX 4 MG
15 LOZENGE BUCCAL
Status: DISCONTINUED | OUTPATIENT
Start: 2022-12-08 | End: 2022-12-10 | Stop reason: HOSPADM

## 2022-12-08 RX ORDER — HEPARIN SODIUM 5000 [USP'U]/ML
5000 INJECTION, SOLUTION INTRAVENOUS; SUBCUTANEOUS EVERY 12 HOURS SCHEDULED
Status: DISCONTINUED | OUTPATIENT
Start: 2022-12-08 | End: 2022-12-10 | Stop reason: HOSPADM

## 2022-12-08 RX ORDER — VANCOMYCIN HYDROCHLORIDE 1 G/200ML
1000 INJECTION, SOLUTION INTRAVENOUS EVERY 12 HOURS
Status: DISCONTINUED | OUTPATIENT
Start: 2022-12-08 | End: 2022-12-10

## 2022-12-08 RX ORDER — LEVOTHYROXINE SODIUM 0.07 MG/1
75 TABLET ORAL
Status: DISCONTINUED | OUTPATIENT
Start: 2022-12-08 | End: 2022-12-10 | Stop reason: HOSPADM

## 2022-12-08 RX ORDER — HEPARIN SODIUM 5000 [USP'U]/ML
INJECTION, SOLUTION INTRAVENOUS; SUBCUTANEOUS
Status: COMPLETED
Start: 2022-12-08 | End: 2022-12-08

## 2022-12-08 RX ORDER — BISACODYL 5 MG/1
5 TABLET, DELAYED RELEASE ORAL DAILY PRN
Status: DISCONTINUED | OUTPATIENT
Start: 2022-12-08 | End: 2022-12-10 | Stop reason: HOSPADM

## 2022-12-08 RX ORDER — SODIUM CHLORIDE 0.9 % (FLUSH) 0.9 %
10 SYRINGE (ML) INJECTION EVERY 12 HOURS SCHEDULED
Status: DISCONTINUED | OUTPATIENT
Start: 2022-12-08 | End: 2022-12-10 | Stop reason: HOSPADM

## 2022-12-08 RX ORDER — ONDANSETRON 4 MG/1
4 TABLET, FILM COATED ORAL EVERY 6 HOURS PRN
Status: DISCONTINUED | OUTPATIENT
Start: 2022-12-08 | End: 2022-12-10 | Stop reason: HOSPADM

## 2022-12-08 RX ORDER — TRAZODONE HYDROCHLORIDE 50 MG/1
50 TABLET ORAL NIGHTLY
Status: DISCONTINUED | OUTPATIENT
Start: 2022-12-08 | End: 2022-12-10 | Stop reason: HOSPADM

## 2022-12-08 RX ORDER — PRAMIPEXOLE DIHYDROCHLORIDE 0.5 MG/1
0.5 TABLET ORAL DAILY
Status: DISCONTINUED | OUTPATIENT
Start: 2022-12-08 | End: 2022-12-08

## 2022-12-08 RX ORDER — INSULIN LISPRO 100 [IU]/ML
0-7 INJECTION, SOLUTION INTRAVENOUS; SUBCUTANEOUS
Status: DISCONTINUED | OUTPATIENT
Start: 2022-12-08 | End: 2022-12-10 | Stop reason: HOSPADM

## 2022-12-08 RX ADMIN — ACETAMINOPHEN 650 MG: 325 TABLET, FILM COATED ORAL at 11:35

## 2022-12-08 RX ADMIN — TRAZODONE HYDROCHLORIDE 50 MG: 50 TABLET ORAL at 20:44

## 2022-12-08 RX ADMIN — VANCOMYCIN HYDROCHLORIDE 2250 MG: 10 INJECTION, POWDER, LYOPHILIZED, FOR SOLUTION INTRAVENOUS at 10:14

## 2022-12-08 RX ADMIN — ACETAMINOPHEN 650 MG: 325 TABLET, FILM COATED ORAL at 20:51

## 2022-12-08 RX ADMIN — HEPARIN SODIUM 5000 UNITS: 5000 INJECTION INTRAVENOUS; SUBCUTANEOUS at 12:40

## 2022-12-08 RX ADMIN — Medication 10 ML: at 12:37

## 2022-12-08 RX ADMIN — HYDROXYCHLOROQUINE SULFATE 200 MG: 200 TABLET, FILM COATED ORAL at 20:44

## 2022-12-08 RX ADMIN — Medication 10 ML: at 22:35

## 2022-12-08 RX ADMIN — VANCOMYCIN HYDROCHLORIDE 1000 MG: 1 INJECTION, SOLUTION INTRAVENOUS at 22:35

## 2022-12-08 RX ADMIN — PERFLUTREN 2 ML: 6.52 INJECTION, SUSPENSION INTRAVENOUS at 18:41

## 2022-12-08 RX ADMIN — PRAMIPEXOLE DIHYDROCHLORIDE 0.5 MG: 0.5 TABLET ORAL at 22:32

## 2022-12-08 RX ADMIN — CEFTRIAXONE SODIUM 1 G: 1 INJECTION, POWDER, FOR SOLUTION INTRAMUSCULAR; INTRAVENOUS at 15:25

## 2022-12-08 RX ADMIN — HEPARIN SODIUM 5000 UNITS: 5000 INJECTION INTRAVENOUS; SUBCUTANEOUS at 20:43

## 2022-12-08 NOTE — ED NOTES
Nursing report ED to floor  Angelina Narayan  67 y.o.  female    HPI :   Chief Complaint   Patient presents with    Wound Infection       Admitting doctor:   Ne Davis MD    Admitting diagnosis:   The encounter diagnosis was Bilateral lower leg cellulitis.    Code status:   Current Code Status       Date Active Code Status Order ID Comments User Context       12/8/2022 1024 CPR (Attempt to Resuscitate) [id number]  Cristiano Joyce APRN ED        Question Answer    Code Status (Patient has no pulse and is not breathing) CPR (Attempt to Resuscitate)    Medical Interventions (Patient has pulse or is breathing) Full Support                    Allergies:   Morphine and related, Sulfa antibiotics, Toradol [ketorolac tromethamine], and Adhesive tape    Isolation:   Contact Spore    Intake and Output    Intake/Output Summary (Last 24 hours) at 12/8/2022 1452  Last data filed at 12/8/2022 1236  Gross per 24 hour   Intake 500 ml   Output --   Net 500 ml       Weight:       12/08/22  0814   Weight: 112 kg (247 lb)       Most recent vitals:   Vitals:    12/08/22 1131 12/08/22 1216 12/08/22 1301 12/08/22 1346   BP: 140/77 121/76 137/84 116/73   Pulse: 85 84 82 80   Resp:       Temp:       TempSrc:       SpO2: 96% 94% 93% 91%   Weight:       Height:           Active LDAs/IV Access:   Lines, Drains & Airways       Active LDAs       Name Placement date Placement time Site Days    Peripheral IV 12/08/22 0759 Left Antecubital 12/08/22  0759  Antecubital  less than 1                    Labs (abnormal labs have a star):   Labs Reviewed   CBC WITH AUTO DIFFERENTIAL - Abnormal; Notable for the following components:       Result Value    RDW 12.2 (*)     Lymphocyte % 18.9 (*)     All other components within normal limits   C-REACTIVE PROTEIN - Abnormal; Notable for the following components:    C-Reactive Protein 2.13 (*)     All other components within normal limits   LACTIC ACID, PLASMA - Normal   TSH - Normal   BLOOD CULTURE   BLOOD  CULTURE   WOUND CULTURE   WOUND CULTURE   GASTROINTESTINAL PANEL, PCR   CLOSTRIDIOIDES DIFFICILE TOXIN, PCR   COMPREHENSIVE METABOLIC PANEL    Narrative:     GFR Normal >60  Chronic Kidney Disease <60  Kidney Failure <15     POCT GLUCOSE FINGERSTICK   POCT GLUCOSE FINGERSTICK   POCT GLUCOSE FINGERSTICK   CBC AND DIFFERENTIAL    Narrative:     The following orders were created for panel order CBC & Differential.  Procedure                               Abnormality         Status                     ---------                               -----------         ------                     CBC Auto Differential[[id number]]        Abnormal            Final result                 Please view results for these tests on the individual orders.       EKG:   ECG 12 Lead Chest Pain   Final Result   HEART RATE= 84  bpm   RR Interval= 714  ms   OK Interval= 187  ms   P Horizontal Axis= 250  deg   P Front Axis= 27  deg   QRSD Interval= 100  ms   QT Interval= 378  ms   QRS Axis= 3  deg   T Wave Axis= -18  deg   - BORDERLINE ECG -   Sinus rhythm   Borderline T abnormalities, diffuse leads   No change from previous tracing   Electronically Signed By: Monster Carcamo) ([facility]) 08-Dec-2022 13:11:53   Date and Time of Study: 2022-12-08 11:46:11          Meds given in ED:   Medications   sodium chloride 0.9 % flush 10 mL (has no administration in time range)   sodium chloride 0.9 % flush 10 mL (10 mL Intravenous Given 12/8/22 1237)   sodium chloride 0.9 % flush 10 mL (has no administration in time range)   sodium chloride 0.9 % infusion 40 mL (has no administration in time range)   heparin (porcine) 5000 UNIT/ML injection 5,000 Units (5,000 Units Subcutaneous Given 12/8/22 1240)   acetaminophen (TYLENOL) tablet 650 mg (650 mg Oral Given 12/8/22 1135)     Or   acetaminophen (TYLENOL) 160 MG/5ML solution 650 mg ( Oral Not Given:  See Alt 12/8/22 1135)     Or   acetaminophen (TYLENOL) suppository 650 mg ( Rectal Not Given:  See Alt 12/8/22  1135)   sennosides-docusate (PERICOLACE) 8.6-50 MG per tablet 2 tablet (has no administration in time range)     And   polyethylene glycol (MIRALAX) packet 17 g (has no administration in time range)     And   bisacodyl (DULCOLAX) EC tablet 5 mg (has no administration in time range)     And   bisacodyl (DULCOLAX) suppository 10 mg (has no administration in time range)   ondansetron (ZOFRAN) tablet 4 mg (has no administration in time range)     Or   ondansetron (ZOFRAN) injection 4 mg (has no administration in time range)   HYDROcodone-acetaminophen (NORCO) 5-325 MG per tablet 1 tablet (has no administration in time range)   levothyroxine (SYNTHROID, LEVOTHROID) tablet 75 mcg (75 mcg Oral Not Given 12/8/22 1139)   pramipexole (MIRAPEX) tablet 0.5 mg (has no administration in time range)   traZODone (DESYREL) tablet 50 mg (has no administration in time range)   cefTRIAXone (ROCEPHIN) 1 g in sodium chloride 0.9 % 100 mL IVPB-VTB (has no administration in time range)   dextrose (GLUTOSE) oral gel 15 g (has no administration in time range)   dextrose (D50W) (25 g/50 mL) IV injection 25 g (has no administration in time range)   glucagon (human recombinant) (GLUCAGEN DIAGNOSTIC) injection 1 mg (has no administration in time range)   insulin lispro (ADMELOG) injection 0-7 Units (has no administration in time range)   vancomycin 2250 mg/500 mL 0.9% NS IVPB ([facility]) (0 mg Intravenous Stopped 12/8/22 1236)       Imaging results:  No radiology results for the last day    Ambulatory status:   - with assist    Social issues:   Social History     Socioeconomic History    Marital status:     Number of children: 3   Tobacco Use    Smoking status: Never    Smokeless tobacco: Never    Tobacco comments:     daily caffine   Substance and Sexual Activity    Alcohol use: No    Drug use: No    Sexual activity: Defer       NIH Stroke Scale:         Jonatan Mckeon RN  12/08/22 14:52 EST

## 2022-12-08 NOTE — ED TRIAGE NOTES
Pt to ER from home via PV with c/o ongoing wounds  to her BLE. Pt states the wounds have been present for several weeks and she has been seen at multiple facilities for them. Pt reports being on oral antibiotics with no relief. Pt also reports drainage from wounds but unable to describe it. Areas are reddened, warm, and tender to touch. Pt ambulatory to triage with steady gait.    Pt also reports recent falls.       Pt masked in triage, staff in appropriate ppe.

## 2022-12-08 NOTE — ED PROVIDER NOTES
EMERGENCY DEPARTMENT ENCOUNTER    Room Number:  10/10  Date seen:  12/8/2022  PCP: Lesly Anthony, CHETNA  Historian: Patient and her daughter      HPI:  Chief Complaint: Leg pain and redness  A complete HPI/ROS/PMH/PSH/SH/FH are unobtainable due to: N/A  Context: Angelina Narayan is a 67 y.o. female who presents to the ED c/o persistent and increasing pain and redness and swelling in the bilateral lower legs with open wounds that are not healing.  Patient has been dealing with recurrent lower leg wounds for several months, unfortunately.  In recent weeks she has sustained some wounds to the bilateral lower legs from simple falls or from simple minor injuries such as when a box of food fell out of the refrigerator and landed against her leg.  This caused a wound that has been very slow to heal and continues to cause some surrounding redness and swelling and drainage from the wound.  She has been on oral antibiotics but has not had any resolution.  She recently completed a course of cephalexin without any improvement at all.  Yesterday she went to [facility] in [facility].  They were apparently hoping to admit her for inpatient care and wound care consult but they had no rooms available and so ended up discharging her and asked her to come back next Wednesday instead.  Patient presents here instead, complaining that the pain is very bad whenever she stands up and she is discouraged that the redness and swelling have not improved at all.      PAST MEDICAL HISTORY  Active Ambulatory Problems     Diagnosis Date Noted   • RIVERA (obstructive sleep apnea) 02/21/2017   • Type 2 diabetes mellitus with obesity (HCC) 02/21/2017   • Chronic fatigue 02/21/2017   • Edema 02/21/2017   • History of gastric ulcer 02/21/2017   • Multiple joint pain 02/21/2017   • Depressive disorder 02/21/2017   • Hypothyroidism 02/21/2017   • Gastric polyp 06/01/2017   • Dietary counseling 06/15/2017   • Encopresis 09/06/2017   •  Gastroesophageal reflux disease 2018   • Rheumatoid arthritis (Former[facility]) 10/14/2015   • Arthritis of knee 2015   • Obesity, Class III, BMI 40-49.9 (morbid obesity) (Formerly [facility]) 2021   • Morbid obesity (Formerly [facility]) 2021     Resolved Ambulatory Problems     Diagnosis Date Noted   • Obesity, Class I, BMI 30-34.9 2017   • GERD (gastroesophageal reflux disease) 2017   • Hiatal hernia 2017   • Post-op pain 06/15/2017   • Obesity, Class II, BMI 35-39.9 2018   • Essential hypertension 2018   • Unintended weight gain 2018   • Dysmorphism 12/10/2015     Past Medical History:   Diagnosis Date   • Anemia    • Cellulitis 2022   • DDD (degenerative disc disease), lumbar    • Diabetes mellitus (Former[facility])    • Disease of thyroid gland    • Fatigue    • Heartburn    • Hypertension    • Joint pain    • RLS (restless legs syndrome)          PAST SURGICAL HISTORY  Past Surgical History:   Procedure Laterality Date   •  SECTION     • COLONOSCOPY     • DILATATION AND CURETTAGE     • ENDOSCOPY     • GASTRIC SLEEVE LAPAROSCOPIC N/A 2017    Procedure: GASTRIC SLEEVE LAPAROSCOPIC WITH HIATAL HERNIA REPIAR, LYSIS OF ADHESIONS;  Surgeon: Job Douglass Jr., MD;  Location: [facility] OR [facility];  Service:    • HYSTERECTOMY     • KNEE ARTHROSCOPY     • LAPAROSCOPIC CHOLECYSTECTOMY     • SKIN GRAFT     • TONSILLECTOMY     • TUBAL ABDOMINAL LIGATION           FAMILY HISTORY  Family History   Problem Relation Age of Onset   • Obesity Mother    • Diabetes Mother    • Hypertension Mother    • Sleep apnea Mother    • Hypertension Father    • Stroke Father    • Heart disease Father    • Hypertension Brother    • Heart attack Brother    • Heart disease Brother    • Sleep apnea Brother    • Cancer Brother    • Heart attack Paternal Grandmother    • Breast cancer Maternal Aunt          SOCIAL HISTORY  Social History     Socioeconomic History   • Marital status:    • Number of children: 3   Tobacco Use    • Smoking status: Never   • Smokeless tobacco: Never   • Tobacco comments:     daily caffine   Substance and Sexual Activity   • Alcohol use: No   • Drug use: No   • Sexual activity: Defer         ALLERGIES  Morphine and related, Sulfa antibiotics, Toradol [ketorolac tromethamine], and Adhesive tape        REVIEW OF SYSTEMS  Review of Systems   Constitutional: Negative for activity change, diaphoresis and fever.   HENT: Negative.    Eyes: Negative for pain and visual disturbance.   Respiratory: Negative for cough and shortness of breath.    Cardiovascular: Negative for chest pain.   Gastrointestinal: Negative for abdominal pain and vomiting.   Genitourinary: Negative for dysuria and frequency.   Musculoskeletal: Positive for arthralgias (knee), gait problem and myalgias.   Skin: Positive for color change (redness) and wound.   Neurological: Positive for weakness. Negative for syncope and headaches.   All other systems reviewed and are negative.         PHYSICAL EXAM  ED Triage Vitals   Temp Heart Rate Resp BP SpO2   12/08/22 0614 12/08/22 0614 12/08/22 0614 12/08/22 0636 12/08/22 0614   97.6 °F (36.4 °C) 93 18 134/70 97 %      Temp src Heart Rate Source Patient Position BP Location FiO2 (%)   12/08/22 0614 -- -- -- --   Tympanic             GENERAL: Pleasant lady, resting calmly in the bed, appears uncomfortable but no acute distress  HENT: nares patent, normocephalic and atraumatic  EYES: no scleral icterus, EOMI, normal conjunctiva  CV: regular rhythm, normal rate, normal distal pulses at the bilateral feet  RESPIRATORY: normal effort, clear to auscultation bilaterally, no stridor  ABDOMEN: soft, not  MUSCULOSKELETAL: Bilateral lower extremities demonstrate cellulitic changes to the anterior tibial soft tissues.  There are 2 separate wounds, one to the left lower anterior tibia and one to the right lower anterior tibia.  Each of these wounds appear to be chronic with some mild to moderate purulent drainage and  surrounding erythematous and indurated wound margins.  The induration and erythema extend proximally through the soft tissues to the proximal anterior tibia regions.  There is 1+ edema noted to the bilateral ankles and feet.  NEURO: alert, moves all extremities, follows commands, no focal deficits  PSYCH:  calm, cooperative  SKIN: warm, dry    Vital signs and nursing notes reviewed.          LAB RESULTS  Recent Results (from the past 24 hour(s))   Comprehensive Metabolic Panel    Collection Time: 12/08/22  8:09 AM    Specimen: Blood   Result Value Ref Range    Glucose 84 65 - 99 mg/dL    BUN 9 8 - 23 mg/dL    Creatinine 0.71 0.57 - 1.00 mg/dL    Sodium 141 136 - 145 mmol/L    Potassium 3.7 3.5 - 5.2 mmol/L    Chloride 105 98 - 107 mmol/L    CO2 28.3 22.0 - 29.0 mmol/L    Calcium 8.9 8.6 - 10.5 mg/dL    Total Protein 6.2 6.0 - 8.5 g/dL    Albumin 3.70 3.50 - 5.20 g/dL    ALT (SGPT) 9 1 - 33 U/L    AST (SGOT) 11 1 - 32 U/L    Alkaline Phosphatase 72 39 - 117 U/L    Total Bilirubin 0.6 0.0 - 1.2 mg/dL    Globulin 2.5 gm/dL    A/G Ratio 1.5 g/dL    BUN/Creatinine Ratio 12.7 7.0 - 25.0    Anion Gap 7.7 5.0 - 15.0 mmol/L    eGFR 94.5 >60.0 mL/min/1.73   Lactic Acid, Plasma    Collection Time: 12/08/22  8:09 AM    Specimen: Blood   Result Value Ref Range    Lactate 1.2 0.5 - 2.0 mmol/L   CBC Auto Differential    Collection Time: 12/08/22  8:09 AM    Specimen: Blood   Result Value Ref Range    WBC 7.50 3.40 - 10.80 10*3/mm3    RBC 4.27 3.77 - 5.28 10*6/mm3    Hemoglobin 12.9 12.0 - 15.9 g/dL    Hematocrit 39.0 34.0 - 46.6 %    MCV 91.3 79.0 - 97.0 fL    MCH 30.2 26.6 - 33.0 pg    MCHC 33.1 31.5 - 35.7 g/dL    RDW 12.2 (L) 12.3 - 15.4 %    RDW-SD 40.8 37.0 - 54.0 fl    MPV 10.4 6.0 - 12.0 fL    Platelets 236 140 - 450 10*3/mm3    Neutrophil % 68.4 42.7 - 76.0 %    Lymphocyte % 18.9 (L) 19.6 - 45.3 %    Monocyte % 10.0 5.0 - 12.0 %    Eosinophil % 1.5 0.3 - 6.2 %    Basophil % 0.8 0.0 - 1.5 %    Immature Grans % 0.4 0.0 - 0.5  %    Neutrophils, Absolute 5.13 1.70 - 7.00 10*3/mm3    Lymphocytes, Absolute 1.42 0.70 - 3.10 10*3/mm3    Monocytes, Absolute 0.75 0.10 - 0.90 10*3/mm3    Eosinophils, Absolute 0.11 0.00 - 0.40 10*3/mm3    Basophils, Absolute 0.06 0.00 - 0.20 10*3/mm3    Immature Grans, Absolute 0.03 0.00 - 0.05 10*3/mm3    nRBC 0.0 0.0 - 0.2 /100 WBC   TSH    Collection Time: 12/08/22  8:09 AM    Specimen: Blood   Result Value Ref Range    TSH 2.180 0.270 - 4.200 uIU/mL   C-reactive Protein    Collection Time: 12/08/22  8:09 AM    Specimen: Blood   Result Value Ref Range    C-Reactive Protein 2.13 (H) 0.00 - 0.50 mg/dL   ECG 12 Lead Chest Pain    Collection Time: 12/08/22 11:46 AM   Result Value Ref Range    QT Interval 378 ms       Ordered the above labs and reviewed the results.        RADIOLOGY  No Radiology Exams Resulted Within Past 24 Hours    Ordered the above noted radiological studies. Reviewed by me in PACS.            PROCEDURES  Procedures          MEDICATIONS GIVEN IN ER  Medications   sodium chloride 0.9 % flush 10 mL (has no administration in time range)   sodium chloride 0.9 % flush 10 mL (has no administration in time range)   sodium chloride 0.9 % flush 10 mL (has no administration in time range)   sodium chloride 0.9 % infusion 40 mL (has no administration in time range)   heparin (porcine) 5000 UNIT/ML injection 5,000 Units (has no administration in time range)   acetaminophen (TYLENOL) tablet 650 mg (650 mg Oral Given 12/8/22 1135)     Or   acetaminophen (TYLENOL) 160 MG/5ML solution 650 mg ( Oral Not Given:  See Alt 12/8/22 1135)     Or   acetaminophen (TYLENOL) suppository 650 mg ( Rectal Not Given:  See Alt 12/8/22 1135)   sennosides-docusate (PERICOLACE) 8.6-50 MG per tablet 2 tablet (has no administration in time range)     And   polyethylene glycol (MIRALAX) packet 17 g (has no administration in time range)     And   bisacodyl (DULCOLAX) EC tablet 5 mg (has no administration in time range)     And    bisacodyl (DULCOLAX) suppository 10 mg (has no administration in time range)   ondansetron (ZOFRAN) tablet 4 mg (has no administration in time range)     Or   ondansetron (ZOFRAN) injection 4 mg (has no administration in time range)   HYDROcodone-acetaminophen (NORCO) 5-325 MG per tablet 1 tablet (has no administration in time range)   levothyroxine (SYNTHROID, LEVOTHROID) tablet 75 mcg (75 mcg Oral Not Given 12/8/22 1139)   pramipexole (MIRAPEX) tablet 0.5 mg (has no administration in time range)   traZODone (DESYREL) tablet 50 mg (has no administration in time range)   vancomycin 2250 mg/500 mL 0.9% NS IVPB ([facility]) (2,250 mg Intravenous New Bag 12/8/22 1014)                   MEDICAL DECISION MAKING, PROGRESS, and CONSULTS    All labs have been independently reviewed by me.  All radiology studies have been reviewed by me and discussed with radiologist dictating the report.   EKG's independently viewed and interpreted by me.  Discussion below represents my analysis of pertinent findings related to patient's condition, differential diagnosis, treatment plan and final disposition.      My diagnosis for lower extremity pain and injury includes but is not limited to hip fracture, femur fracture, hip dislocation, hip contusion, hip sprain, hip strain, pelvic fracture, ischio-tibial band pain, ischio-tibial band bursitis, knee sprain, patella dislocation, knee dislocation, internal derangement of knee, fractures of the femur, tibia, fibula, ankle, foot and digits, ankle sprain, ankle dislocation, Lisfranc fracture, fracture dislocations of the digits, pulmonary embolism, claudication, peripheral vascular disease, gout, osteoarthritis, rheumatoid arthritis, bursitis, septic joint, poly-rheumatica, polyarthralgia and other inflammatory or infectious disease processes.      ED Course as of 12/08/22 1206   Thu Dec 08, 2022   0749 Patient appears to have failed outpatient management for cellulitis with oral antibiotic.   Drawing basic labs and blood cultures at this time.  Will obtain wound cultures also.  Anticipate will need to be admitted for IV antibiotics and wound care consult at this point. [STEVEN]   0915 No significant abnormality notable in the labs [STEVEN]   0933 Spoke with Cristiano from [facility].  She accepts patient on behalf of Dr. Hall for continued care [STEVEN]      ED Course User Index  [STEVEN] Sumit Hansen MD            Patient was placed in face mask during triage.  Patient was wearing face mask throughout encounter.  I wore personal protective equipment throughout the encounter.  Hand hygiene was performed before and after patient encounter.     DIAGNOSIS  Final diagnoses:   Bilateral lower leg cellulitis         DISPOSITION  Observation to [facility]            Latest Documented Vital Signs:  As of 12:06 EST  BP- 140/77 HR- 85 Temp- 97.6 °F (36.4 °C) (Tympanic) O2 sat- 96%        --    Please note that portions of this were completed with a voice recognition program.          Sumit Hansen MD  12/08/22 0943       Sumit Hansen MD  12/08/22 1207

## 2022-12-08 NOTE — NURSING NOTE
CWON ote: pt seen for evaluation of Hadley LE wounds POA. She is alert and oriented, her daughter is at bedside. Pt presents with ongoing Hx of worsening wounds, both of which she received as traumatic injuries. She has failed OP therapy with oral antibiotics and wounds have continued to worsen with increase in pain, erythema, and edema. She reports her NATALIYA's, recently performed at another facility, were normal. H&P noted.     LLE is edematous and erythematous, wound is painful to touch. Wound base with yellow fibrinous slough in wound base, 2x 3x 0.3cms with moderate amount of seropurulent drainage.           RLE is edematous and erythematous (less erythema than left leg), wound is painful to touch. Wound base with yellow fibrinous slough in wound base, 3.5x 2.5x 0.4cms with moderate amount of seropurulent drainage.           Legs were washed with foaming soap and water, legs dried. Thera Honey and Opticel ag applied to wounds, covered with ABD pads. Hadley LE wrapped with Kerlix and 2 ACE wraps toes to knees. Will plan to change dressings tomorrow to reassess wounds.

## 2022-12-08 NOTE — H&P
Patient Name:  Angelina Narayan  YOB: 1955  MRN:  [id number]  Admit Date:  12/8/2022  Patient Care Team:  Lesly Anthony NP-C as PCP - General (Nurse Practitioner)      Subjective   History Present Illness     Chief Complaint   Patient presents with   • Wound Infection       Ms. Narayan is a 67 y.o. never smoker and  by profession with a history of morbid obesity, hypertension, GERD, hypothyroidism, type 2 diabetes mellitus (diet-controlled), cellulitis (BLE), rheumatoid arthritis (Plaquenil), neuropathy who presents to [facility] chief complaint of wound infection admitted for cellulitis and bilateral lower extremity wound infection on anterior aspect.    Patient answering all questions appropriately states chronic bilateral lower extremity wounds treated with multiple antibiotic over the last month; however, persistent bilateral lower extremity swelling with erythema and pain despite antibiotic therapy (completed Keflex followed by doxycycline); therefore, went to [facility] in [place] recently with recommendation for hospital admission; however, no beds available; therefore, went to [facility] for further evaluation as pain and tolerated and patient subsequently unable to ambulate.    Complaints of intermittent diarrhea onset beginning approximately 2 days ago.  Denies history of C. difficile colitis.    AVSS on room air.  Patient received empiric IV vancomycin in ER.    Recommendation pending hospital course.  Details below.    History of Present Illness  Review of Systems   Constitutional: Negative for chills and fever.   HENT: Negative for congestion and rhinorrhea.    Respiratory: Negative for cough and shortness of breath.    Cardiovascular: Positive for leg swelling. Negative for chest pain.   Gastrointestinal: Negative for abdominal pain, constipation, diarrhea, nausea and vomiting.   Endocrine: Negative for polydipsia, polyphagia and polyuria.   Genitourinary: Negative for  difficulty urinating and dysuria.   Musculoskeletal: Positive for gait problem (Due to bilateral lower extremity pain) and myalgias (BLE).   Skin: Positive for color change (BLE) and wound (BLE).   Neurological: Positive for numbness (BLE). Negative for syncope and light-headedness.   Psychiatric/Behavioral: Negative for confusion and hallucinations.        Personal History     Past Medical History:   Diagnosis Date   • Anemia    • Cellulitis 2022   • DDD (degenerative disc disease), lumbar    • Diabetes mellitus (HCC)    • Disease of thyroid gland     HYPOTHYROID   • Fatigue    • GERD (gastroesophageal reflux disease)    • Heartburn    • Hiatal hernia    • Hypertension    • Joint pain    • RIVERA (obstructive sleep apnea)     CPAP   • RLS (restless legs syndrome)      Past Surgical History:   Procedure Laterality Date   •  SECTION     • COLONOSCOPY     • DILATATION AND CURETTAGE     • ENDOSCOPY     • GASTRIC SLEEVE LAPAROSCOPIC N/A 2017    Procedure: GASTRIC SLEEVE LAPAROSCOPIC WITH HIATAL HERNIA REPIAR, LYSIS OF ADHESIONS;  Surgeon: Job Douglass Jr., MD;  Location: [facility] OR [facility];  Service:    • HYSTERECTOMY     • KNEE ARTHROSCOPY     • LAPAROSCOPIC CHOLECYSTECTOMY     • SKIN GRAFT     • TONSILLECTOMY     • TUBAL ABDOMINAL LIGATION       Family History   Problem Relation Age of Onset   • Obesity Mother    • Diabetes Mother    • Hypertension Mother    • Sleep apnea Mother    • Hypertension Father    • Stroke Father    • Heart disease Father    • Hypertension Brother    • Heart attack Brother    • Heart disease Brother    • Sleep apnea Brother    • Cancer Brother    • Heart attack Paternal Grandmother    • Breast cancer Maternal Aunt      Social History     Tobacco Use   • Smoking status: Never   • Smokeless tobacco: Never   • Tobacco comments:     daily caffine   Substance Use Topics   • Alcohol use: No   • Drug use: No     No current facility-administered medications on file prior to encounter.      Current Outpatient Medications on File Prior to Encounter   Medication Sig Dispense Refill   • buPROPion XL (WELLBUTRIN XL) 150 MG 24 hr tablet Take 150 mg by mouth Daily As Needed.     • diphenhydrAMINE-acetaminophen (Tylenol PM Extra Strength)  MG tablet per tablet Take 2 tablets by mouth At Night As Needed.     • doxycycline (MONODOX) 100 MG capsule Take 1 capsule by mouth 2 (Two) Times a Day. 20 capsule 0   • hydroxychloroquine (PLAQUENIL) 200 MG tablet Take 200 mg by mouth Daily.     • levothyroxine (SYNTHROID, LEVOTHROID) 75 MCG tablet Take 75 mcg by mouth Daily.     • Magnesium Cl-Calcium Carbonate (SLOW-MAG PO) Take 1 tablet by mouth Every Night.     • potassium chloride (K-DUR,KLOR-CON) 20 MEQ CR tablet Take 20 mEq by mouth 2 (Two) Times a Day.     • pramipexole (MIRAPEX) 0.5 MG tablet   0 Refill(s)     • traZODone (DESYREL) 50 MG tablet Take 50 mg by mouth Daily.     • vitamin D3 125 MCG (5000 UT) capsule capsule Take 5,000 Units by mouth Daily.     • cetirizine (zyrTEC) 10 MG tablet Take 10 mg by mouth As Needed.     • Durolane 60 MG/3ML prefilled syringe injection      • multivitamin with minerals tablet tablet Take 1 tablet by mouth Daily.     • tocilizumab (Actemra) 80 MG/4ML solution injection Administer ACTEMRA 4mg/kg IV every 4 weeks     • [DISCONTINUED] (No Medication Selected) Take 1,400 mg by mouth. Tumeric      • [DISCONTINUED] benzonatate (TESSALON) 200 MG capsule Take 1 capsule by mouth 3 (Three) Times a Day As Needed for Cough. 28 capsule 0   • [DISCONTINUED] fluconazole (DIFLUCAN) 150 MG tablet Take 1 tablet p.o. on September 23, 2022.  Take second tablet p.o. on September 26, 2022 2 tablet 0   • [DISCONTINUED] promethazine-dextromethorphan (PROMETHAZINE-DM) 6.25-15 MG/5ML syrup Take 5 mL by mouth 4 (Four) Times a Day As Needed for Cough. 118 mL 0     Allergies   Allergen Reactions   • Morphine And Related Itching   • Sulfa Antibiotics Itching   • Toradol [Ketorolac Tromethamine]  Itching   • Adhesive Tape Rash       Objective    Objective     Vital Signs  Temp:  [97.6 °F (36.4 °C)] 97.6 °F (36.4 °C)  Heart Rate:  [75-93] 80  Resp:  [18] 18  BP: (105-134)/(70-86) 128/83  SpO2:  [94 %-99 %] 95 %  on   ;   Device (Oxygen Therapy): room air  Body mass index is 41.1 kg/m².    Physical Exam  Constitutional:       General: She is not in acute distress.     Appearance: She is obese. She is not toxic-appearing.   HENT:      Head: Normocephalic and atraumatic.   Eyes:      Extraocular Movements: Extraocular movements intact.      Conjunctiva/sclera: Conjunctivae normal.   Cardiovascular:      Rate and Rhythm: Normal rate.      Heart sounds: Normal heart sounds.   Pulmonary:      Effort: Pulmonary effort is normal.      Breath sounds: Normal breath sounds.   Abdominal:      General: Bowel sounds are normal.      Palpations: Abdomen is soft.   Musculoskeletal:         General: Tenderness (BLE) present.      Cervical back: Normal range of motion and neck supple.      Right lower leg: Edema (BLE) present.      Left lower leg: Edema present.   Skin:     General: Skin is warm and dry.      Findings: Erythema (BLE) present.      Comments: Open wound with granulation tissue, BLE   Neurological:      Mental Status: She is alert and oriented to person, place, and time.      Cranial Nerves: No cranial nerve deficit.   Psychiatric:         Behavior: Behavior normal.         Thought Content: Thought content normal.         Results Review:  I reviewed the patient's new clinical results.  I reviewed the patient's new imaging results and agree with the interpretation.  I reviewed the patient's other test results and agree with the interpretation  I personally viewed and interpreted the patient's EKG/Telemetry data  Discussed with ED provider.    Lab Results (last 24 hours)     Procedure Component Value Units Date/Time    CBC & Differential [[id number]]  (Abnormal) Collected: 12/08/22 0809    Specimen: Blood Updated:  12/08/22 0838    Narrative:      The following orders were created for panel order CBC & Differential.  Procedure                               Abnormality         Status                     ---------                               -----------         ------                     CBC Auto Differential[[id number]]        Abnormal            Final result                 Please view results for these tests on the individual orders.    Comprehensive Metabolic Panel [[id number]] Collected: 12/08/22 0809    Specimen: Blood Updated: 12/08/22 0900     Glucose 84 mg/dL      BUN 9 mg/dL      Creatinine 0.71 mg/dL      Sodium 141 mmol/L      Potassium 3.7 mmol/L      Chloride 105 mmol/L      CO2 28.3 mmol/L      Calcium 8.9 mg/dL      Total Protein 6.2 g/dL      Albumin 3.70 g/dL      ALT (SGPT) 9 U/L      AST (SGOT) 11 U/L      Alkaline Phosphatase 72 U/L      Total Bilirubin 0.6 mg/dL      Globulin 2.5 gm/dL      A/G Ratio 1.5 g/dL      BUN/Creatinine Ratio 12.7     Anion Gap 7.7 mmol/L      eGFR 94.5 mL/min/1.73      Comment: National Kidney Foundation and American Society of Nephrology (ASN) Task Force recommended calculation based on the Chronic Kidney Disease Epidemiology Collaboration (CKD-EPI) equation refit without adjustment for race.       Narrative:      GFR Normal >60  Chronic Kidney Disease <60  Kidney Failure <15      Lactic Acid, Plasma [[id number]]  (Normal) Collected: 12/08/22 0809    Specimen: Blood Updated: 12/08/22 0855     Lactate 1.2 mmol/L     Blood Culture - Blood, Arm, Left [[id number]] Collected: 12/08/22 0809    Specimen: Blood from Arm, Left Updated: 12/08/22 0833    CBC Auto Differential [[id number]]  (Abnormal) Collected: 12/08/22 0809    Specimen: Blood Updated: 12/08/22 0838     WBC 7.50 10*3/mm3      RBC 4.27 10*6/mm3      Hemoglobin 12.9 g/dL      Hematocrit 39.0 %      MCV 91.3 fL      MCH 30.2 pg      MCHC 33.1 g/dL      RDW 12.2 %      RDW-SD 40.8 fl      MPV 10.4 fL      Platelets 236 10*3/mm3       Neutrophil % 68.4 %      Lymphocyte % 18.9 %      Monocyte % 10.0 %      Eosinophil % 1.5 %      Basophil % 0.8 %      Immature Grans % 0.4 %      Neutrophils, Absolute 5.13 10*3/mm3      Lymphocytes, Absolute 1.42 10*3/mm3      Monocytes, Absolute 0.75 10*3/mm3      Eosinophils, Absolute 0.11 10*3/mm3      Basophils, Absolute 0.06 10*3/mm3      Immature Grans, Absolute 0.03 10*3/mm3      nRBC 0.0 /100 WBC     Blood Culture - Blood, Arm, Right [[id number]] Collected: 12/08/22 0937    Specimen: Blood from Arm, Right Updated: 12/08/22 0954    Wound Culture - Drainage, Leg, Left [[id number]] Collected: 12/08/22 0940    Specimen: Drainage from Leg, Left Updated: 12/08/22 0953    Wound Culture - Drainage, Leg, Right [[id number]] Collected: 12/08/22 0942    Specimen: Drainage from Leg, Right Updated: 12/08/22 0953          Imaging Results (Last 24 Hours)     ** No results found for the last 24 hours. **          Results for orders placed during the hospital encounter of 07/06/21    Adult Transthoracic Echo Complete W/ Cont if Necessary Per Protocol    Interpretation Summary  · Estimated left ventricular EF = 60% Left ventricular systolic function is normal.  · Left ventricular diastolic function was normal.  · Estimated right ventricular systolic pressure from tricuspid regurgitation is normal (<35 mmHg).      No orders to display        Assessment/Plan     Active Hospital Problems    Diagnosis  POA   • **Cellulitis [L03.90]  Yes   • Wound infection, BLE [T14.8XXA, L08.9]  Yes   • Diarrhea of presumed infectious origin [R19.7]  Yes   • Obesity, Class III, BMI 40-49.9 (morbid obesity) ([facility]) [E66.01]  Yes   • Type 2 diabetes mellitus with obesity ([facility]) [E11.69, E66.9]  Yes   • Hypothyroidism [E03.9]  Yes   • Edema [R60.9]  Yes   • Rheumatoid arthritis ([facility]) [M06.9]  Yes      Resolved Hospital Problems   No resolved problems to display.       Ms. Narayan is a 67 y.o. never smoker with a history of morbid obesity, hypertension,  "GERD, hypothyroidism, type 2 diabetes mellitus (diet-controlled), cellulitis (BLE), rheumatoid arthritis (Plaquenil) who presents to [facility] ER chief complaint of wound infection admitted for cellulitis and bilateral lower extremity wound infection on anterior aspect.      Cellulitis: Failed outpatient treatment.  Reports antibiotic therapy over the last month to include Keflex and doxycycline.  Continue empiric IV vancomycin for now.  Wound cultures and BC x2 pending results.  Wound care consultation.  Consult infectious disease consultation for antibiotic management.  ESR, CRP.  Symptom management--avoid sedation.  Bowel regimen as needed.      Wound infection, BLE: Present on admission.  Consult wound nurse.  See plan above.        Diarrhea: Denies abdominal pain.  Recent multiple antibiotic therapy.  Ordering GI PCR.      Edema: Previous echocardiogram LVEF 60% (1/2021).  Ordering TTE.      Neuropathy: Likely contributing to above.  Patient previously refused gabapentin (as it is \"controlled\" with reported family history of addiction).  Falls precautions.  PT/OT consult.      Type 2 diabetes mellitus with obesity (HCC): A1c in AM.  Monitor off and correctional insulin for now.      Hypothyroidism: Ordering TSH.  Continue current levothyroxine per home dose regimen.      Rheumatoid arthritis (HCC): Reports chronic use of Plaquenil PTA.      Obesity, Class III, BMI 40-49.9 (morbid obesity) (HCC): BMI 41.  Complicating above and likely contributing.    · I discussed the patient's findings and my recommendations with patient, RN, & Dr. Davis.    VTE Prophylaxis - heparin SC  Code Status - CPR       WALT Patel  [facility]  12/08/22  10:41 EST    "

## 2022-12-08 NOTE — PROGRESS NOTES
"[facility]: Vancomycin Pharmacokinetic Initial Consult Note    Angeilna Narayan is a 67 y.o. female who is on day 1 of pharmacy to dose vancomycin.    Indication: Skin and Soft Tissue  Consulting Provider: Isiah GODOY  Planned Duration of Therapy: 5 days  Loading Dose Ordered or Given: 2250 mg on 12/8 at ~1000  MRSA PCR performed: no  Culture/Source: Bld and wound: pending  Target: -600 mg/L.hr   Other Antimicrobials: rocephin    Vitals/Labs  Ht: 165.1 cm (65\"); Wt: 112 kg (247 lb)  Temp Readings from Last 1 Encounters:   12/08/22 97.6 °F (36.4 °C) (Tympanic)    Estimated Creatinine Clearance: 98.5 mL/min (by C-G formula based on SCr of 0.71 mg/dL).       Results from last 7 days   Lab Units 12/08/22  0809   CREATININE mg/dL 0.71   WBC 10*3/mm3 7.50     Assessment/Plan:    Vancomycin Dose: 1000 mg IV every 12  hours to start at 2200 tonight  Predictive AUC level for the dose ordered is 540 mg/L.hr, which is within the target of 400-600 mg/L.hr  Vanc Trough has been ordered for 12/10/22 at 0930     Pharmacy will follow patient's kidney function and will adjust doses and obtain levels as necessary. Thank you for involving pharmacy in this patient's care. Please contact pharmacy with any questions or concerns.                           Jc Mancuso, [facility]  Clinical Pharmacist   "

## 2022-12-09 LAB
ANION GAP SERPL CALCULATED.3IONS-SCNC: 9.9 MMOL/L (ref 5–15)
AORTIC DIMENSIONLESS INDEX: 1 (DI)
BASOPHILS # BLD AUTO: 0.07 10*3/MM3 (ref 0–0.2)
BASOPHILS NFR BLD AUTO: 1.2 % (ref 0–1.5)
BH CV ECHO MEAS - ACS: 1.89 CM
BH CV ECHO MEAS - AO MAX PG: 11 MMHG
BH CV ECHO MEAS - AO MEAN PG: 5.5 MMHG
BH CV ECHO MEAS - AO ROOT DIAM: 2.6 CM
BH CV ECHO MEAS - AO V2 MAX: 165.6 CM/SEC
BH CV ECHO MEAS - AO V2 VTI: 30.4 CM
BH CV ECHO MEAS - AVA(I,D): 3.1 CM2
BH CV ECHO MEAS - EDV(CUBED): 67 ML
BH CV ECHO MEAS - EDV(MOD-SP2): 100 ML
BH CV ECHO MEAS - EDV(MOD-SP4): 169 ML
BH CV ECHO MEAS - EF(MOD-BP): 67.6 %
BH CV ECHO MEAS - EF(MOD-SP2): 66 %
BH CV ECHO MEAS - EF(MOD-SP4): 66.9 %
BH CV ECHO MEAS - ESV(CUBED): 20.9 ML
BH CV ECHO MEAS - ESV(MOD-SP2): 34 ML
BH CV ECHO MEAS - ESV(MOD-SP4): 56 ML
BH CV ECHO MEAS - FS: 32.2 %
BH CV ECHO MEAS - IVS/LVPW: 1 CM
BH CV ECHO MEAS - IVSD: 1.14 CM
BH CV ECHO MEAS - LAT PEAK E' VEL: 14.8 CM/SEC
BH CV ECHO MEAS - LV DIASTOLIC VOL/BSA (35-75): 78.1 CM2
BH CV ECHO MEAS - LV MASS(C)D: 157.8 GRAMS
BH CV ECHO MEAS - LV MAX PG: 7.4 MMHG
BH CV ECHO MEAS - LV MEAN PG: 4.4 MMHG
BH CV ECHO MEAS - LV SYSTOLIC VOL/BSA (12-30): 25.9 CM2
BH CV ECHO MEAS - LV V1 MAX: 135.8 CM/SEC
BH CV ECHO MEAS - LV V1 VTI: 29.3 CM
BH CV ECHO MEAS - LVIDD: 4.1 CM
BH CV ECHO MEAS - LVIDS: 2.8 CM
BH CV ECHO MEAS - LVOT AREA: 3.2 CM2
BH CV ECHO MEAS - LVOT DIAM: 2.02 CM
BH CV ECHO MEAS - LVPWD: 1.14 CM
BH CV ECHO MEAS - MED PEAK E' VEL: 10 CM/SEC
BH CV ECHO MEAS - MV A MAX VEL: 96.8 CM/SEC
BH CV ECHO MEAS - MV DEC SLOPE: 513 CM/SEC2
BH CV ECHO MEAS - MV DEC TIME: 0.19 MSEC
BH CV ECHO MEAS - MV E MAX VEL: 105 CM/SEC
BH CV ECHO MEAS - MV E/A: 1.08
BH CV ECHO MEAS - MV MAX PG: 5.3 MMHG
BH CV ECHO MEAS - MV MEAN PG: 3.3 MMHG
BH CV ECHO MEAS - MV P1/2T: 67.4 MSEC
BH CV ECHO MEAS - MV V2 VTI: 32.2 CM
BH CV ECHO MEAS - MVA(P1/2T): 3.3 CM2
BH CV ECHO MEAS - MVA(VTI): 2.9 CM2
BH CV ECHO MEAS - PA V2 MAX: 115.4 CM/SEC
BH CV ECHO MEAS - QP/QS: 0.67
BH CV ECHO MEAS - RAP SYSTOLE: 3 MMHG
BH CV ECHO MEAS - RV MAX PG: 2.17 MMHG
BH CV ECHO MEAS - RV V1 MAX: 73.7 CM/SEC
BH CV ECHO MEAS - RV V1 VTI: 16.5 CM
BH CV ECHO MEAS - RVOT DIAM: 2.21 CM
BH CV ECHO MEAS - RVSP: 20 MMHG
BH CV ECHO MEAS - SI(MOD-SP2): 30.5 ML/M2
BH CV ECHO MEAS - SI(MOD-SP4): 52.2 ML/M2
BH CV ECHO MEAS - SV(LVOT): 94.1 ML
BH CV ECHO MEAS - SV(MOD-SP2): 66 ML
BH CV ECHO MEAS - SV(MOD-SP4): 113 ML
BH CV ECHO MEAS - SV(RVOT): 63.4 ML
BH CV ECHO MEAS - TR MAX PG: 17 MMHG
BH CV ECHO MEAS - TR MAX VEL: 203.6 CM/SEC
BH CV ECHO MEASUREMENTS AVERAGE E/E' RATIO: 8.47
BH CV XLRA - TDI S': 10.1 CM/SEC
BUN SERPL-MCNC: 6 MG/DL (ref 8–23)
BUN/CREAT SERPL: 11.1 (ref 7–25)
CALCIUM SPEC-SCNC: 8.4 MG/DL (ref 8.6–10.5)
CHLORIDE SERPL-SCNC: 104 MMOL/L (ref 98–107)
CO2 SERPL-SCNC: 24.1 MMOL/L (ref 22–29)
CREAT SERPL-MCNC: 0.54 MG/DL (ref 0.57–1)
DEPRECATED RDW RBC AUTO: 38.6 FL (ref 37–54)
EGFRCR SERPLBLD CKD-EPI 2021: 102.3 ML/MIN/1.73
EOSINOPHIL # BLD AUTO: 0.15 10*3/MM3 (ref 0–0.4)
EOSINOPHIL NFR BLD AUTO: 2.5 % (ref 0.3–6.2)
ERYTHROCYTE [DISTWIDTH] IN BLOOD BY AUTOMATED COUNT: 11.9 % (ref 12.3–15.4)
ERYTHROCYTE [SEDIMENTATION RATE] IN BLOOD: 30 MM/HR (ref 0–30)
GLUCOSE BLDC GLUCOMTR-MCNC: 106 MG/DL (ref 70–130)
GLUCOSE BLDC GLUCOMTR-MCNC: 121 MG/DL (ref 70–130)
GLUCOSE BLDC GLUCOMTR-MCNC: 177 MG/DL (ref 70–130)
GLUCOSE BLDC GLUCOMTR-MCNC: 79 MG/DL (ref 70–130)
GLUCOSE SERPL-MCNC: 92 MG/DL (ref 65–99)
HBA1C MFR BLD: 5.8 % (ref 4.8–5.6)
HCT VFR BLD AUTO: 37.6 % (ref 34–46.6)
HGB BLD-MCNC: 12.6 G/DL (ref 12–15.9)
IMM GRANULOCYTES # BLD AUTO: 0.03 10*3/MM3 (ref 0–0.05)
IMM GRANULOCYTES NFR BLD AUTO: 0.5 % (ref 0–0.5)
LEFT ATRIUM VOLUME INDEX: 25.1 ML/M2
LYMPHOCYTES # BLD AUTO: 1.65 10*3/MM3 (ref 0.7–3.1)
LYMPHOCYTES NFR BLD AUTO: 27.5 % (ref 19.6–45.3)
MAXIMAL PREDICTED HEART RATE: 155 BPM
MCH RBC QN AUTO: 30.1 PG (ref 26.6–33)
MCHC RBC AUTO-ENTMCNC: 33.5 G/DL (ref 31.5–35.7)
MCV RBC AUTO: 89.7 FL (ref 79–97)
MONOCYTES # BLD AUTO: 0.59 10*3/MM3 (ref 0.1–0.9)
MONOCYTES NFR BLD AUTO: 9.8 % (ref 5–12)
NEUTROPHILS NFR BLD AUTO: 3.51 10*3/MM3 (ref 1.7–7)
NEUTROPHILS NFR BLD AUTO: 58.5 % (ref 42.7–76)
NRBC BLD AUTO-RTO: 0 /100 WBC (ref 0–0.2)
PLATELET # BLD AUTO: 217 10*3/MM3 (ref 140–450)
PMV BLD AUTO: 10.5 FL (ref 6–12)
POTASSIUM SERPL-SCNC: 4.1 MMOL/L (ref 3.5–5.2)
RBC # BLD AUTO: 4.19 10*6/MM3 (ref 3.77–5.28)
SINUS: 3.1 CM
SODIUM SERPL-SCNC: 138 MMOL/L (ref 136–145)
STRESS TARGET HR: 132 BPM
WBC NRBC COR # BLD: 6 10*3/MM3 (ref 3.4–10.8)

## 2022-12-09 PROCEDURE — 85025 COMPLETE CBC W/AUTO DIFF WBC: CPT | Performed by: INTERNAL MEDICINE

## 2022-12-09 PROCEDURE — 97110 THERAPEUTIC EXERCISES: CPT

## 2022-12-09 PROCEDURE — 25010000002 VANCOMYCIN PER 500 MG: Performed by: NURSE PRACTITIONER

## 2022-12-09 PROCEDURE — G0378 HOSPITAL OBSERVATION PER HR: HCPCS

## 2022-12-09 PROCEDURE — 83036 HEMOGLOBIN GLYCOSYLATED A1C: CPT | Performed by: NURSE PRACTITIONER

## 2022-12-09 PROCEDURE — 96372 THER/PROPH/DIAG INJ SC/IM: CPT

## 2022-12-09 PROCEDURE — 99204 OFFICE O/P NEW MOD 45 MIN: CPT | Performed by: INTERNAL MEDICINE

## 2022-12-09 PROCEDURE — 82962 GLUCOSE BLOOD TEST: CPT

## 2022-12-09 PROCEDURE — 80048 BASIC METABOLIC PNL TOTAL CA: CPT | Performed by: INTERNAL MEDICINE

## 2022-12-09 PROCEDURE — 97161 PT EVAL LOW COMPLEX 20 MIN: CPT

## 2022-12-09 PROCEDURE — 25010000002 HEPARIN (PORCINE) PER 1000 UNITS: Performed by: NURSE PRACTITIONER

## 2022-12-09 PROCEDURE — 85652 RBC SED RATE AUTOMATED: CPT | Performed by: NURSE PRACTITIONER

## 2022-12-09 PROCEDURE — 97530 THERAPEUTIC ACTIVITIES: CPT

## 2022-12-09 PROCEDURE — 25010000002 CEFTRIAXONE PER 250 MG: Performed by: INTERNAL MEDICINE

## 2022-12-09 PROCEDURE — 63710000001 INSULIN LISPRO (HUMAN) PER 5 UNITS: Performed by: INTERNAL MEDICINE

## 2022-12-09 PROCEDURE — 36415 COLL VENOUS BLD VENIPUNCTURE: CPT | Performed by: INTERNAL MEDICINE

## 2022-12-09 PROCEDURE — 96367 TX/PROPH/DG ADDL SEQ IV INF: CPT

## 2022-12-09 RX ADMIN — Medication 10 ML: at 21:53

## 2022-12-09 RX ADMIN — TRAZODONE HYDROCHLORIDE 50 MG: 50 TABLET ORAL at 21:43

## 2022-12-09 RX ADMIN — VANCOMYCIN HYDROCHLORIDE 1000 MG: 1 INJECTION, SOLUTION INTRAVENOUS at 10:27

## 2022-12-09 RX ADMIN — VANCOMYCIN HYDROCHLORIDE 1000 MG: 1 INJECTION, SOLUTION INTRAVENOUS at 21:56

## 2022-12-09 RX ADMIN — Medication 10 ML: at 08:28

## 2022-12-09 RX ADMIN — CEFTRIAXONE SODIUM 1 G: 1 INJECTION, POWDER, FOR SOLUTION INTRAMUSCULAR; INTRAVENOUS at 15:00

## 2022-12-09 RX ADMIN — HEPARIN SODIUM 5000 UNITS: 5000 INJECTION INTRAVENOUS; SUBCUTANEOUS at 08:29

## 2022-12-09 RX ADMIN — PRAMIPEXOLE DIHYDROCHLORIDE 0.5 MG: 0.5 TABLET ORAL at 21:43

## 2022-12-09 RX ADMIN — HEPARIN SODIUM 5000 UNITS: 5000 INJECTION INTRAVENOUS; SUBCUTANEOUS at 21:43

## 2022-12-09 RX ADMIN — INSULIN LISPRO 2 UNITS: 100 INJECTION, SOLUTION INTRAVENOUS; SUBCUTANEOUS at 18:27

## 2022-12-09 NOTE — SIGNIFICANT NOTE
12/09/22 1130   OTHER   Discipline occupational therapist   Rehab Time/Intention   Session Not Performed other (see comments)  (spoke with pt, no concerns for ADL task completion, up with PT in hallway with no AD. Plans to f/u with OPPT at d/c. OT to sign off at this time.)

## 2022-12-09 NOTE — THERAPY EVALUATION
Patient Name: Angelina Narayan  : 1955    MRN: [id number]                              Today's Date: 2022       Admit Date: 2022    Visit Dx:     ICD-10-CM ICD-9-CM   1. Bilateral lower leg cellulitis  L03.116 682.6    L03.115      Patient Active Problem List   Diagnosis   • RIVERA (obstructive sleep apnea)   • Type 2 diabetes mellitus with obesity (HCC)   • Chronic fatigue   • Edema   • History of gastric ulcer   • Multiple joint pain   • Depressive disorder   • Hypothyroidism   • Gastric polyp   • Dietary counseling   • Encopresis   • Gastroesophageal reflux disease   • Rheumatoid arthritis (HCC)   • Arthritis of knee   • Obesity, Class III, BMI 40-49.9 (morbid obesity) (HCC)   • Morbid obesity (HCC)   • Cellulitis   • Wound infection, BLE   • Diarrhea of presumed infectious origin   • Neuropathy     Past Medical History:   Diagnosis Date   • Anemia    • Cellulitis 2022   • DDD (degenerative disc disease), lumbar    • Diabetes mellitus (HCC)    • Disease of thyroid gland     HYPOTHYROID   • Fatigue    • GERD (gastroesophageal reflux disease)    • Heartburn    • Hiatal hernia    • Hypertension    • Joint pain    • RIVERA (obstructive sleep apnea)     CPAP   • RLS (restless legs syndrome)      Past Surgical History:   Procedure Laterality Date   •  SECTION     • COLONOSCOPY     • DILATATION AND CURETTAGE     • ENDOSCOPY     • GASTRIC SLEEVE LAPAROSCOPIC N/A 2017    Procedure: GASTRIC SLEEVE LAPAROSCOPIC WITH HIATAL HERNIA REPIAR, LYSIS OF ADHESIONS;  Surgeon: Job Douglass Jr., MD;  Location: [facility];  Service:    • HYSTERECTOMY     • KNEE ARTHROSCOPY     • LAPAROSCOPIC CHOLECYSTECTOMY     • SKIN GRAFT     • TONSILLECTOMY     • TUBAL ABDOMINAL LIGATION        General Information     Row Name 22 1036          Physical Therapy Time and Intention    Document Type evaluation  -MW     Mode of Treatment physical therapy  -MW     Row Name 22 1036          General Information     Patient Profile Reviewed yes  -MW     Prior Level of Function independent:;gait  -MW     Existing Precautions/Restrictions fall  -MW     Barriers to Rehab previous functional deficit  -MW     Row Name 12/09/22 1036          Living Environment    People in Home spouse;other (see comments)  Assists her   with ADLs  -MW     Row Name 12/09/22 1036          Home Main Entrance    Number of Stairs, Main Entrance one  -MW     Row Name 12/09/22 1036          Cognition    Orientation Status (Cognition) oriented x 4  -MW     Row Name 12/09/22 1036          Safety Issues, Functional Mobility    Impairments Affecting Function (Mobility) balance;endurance/activity tolerance;strength;pain  -MW     Comment, Safety Issues/Impairments (Mobility) Gait belt and nonslip socks used for safety  -MW           User Key  (r) = Recorded By, (t) = Taken By, (c) = Cosigned By    Initials Name Provider Type    Christiane Chaidez, PT Physical Therapist               Mobility     Row Name 12/09/22 1057          Bed Mobility    Comment, (Bed Mobility) sitting up EOB  -MW     Row Name 12/09/22 1057          Sit-Stand Transfer    Sit-Stand Emery (Transfers) modified independence  -MW     Row Name 12/09/22 1057          Gait/Stairs (Locomotion)    Emery Level (Gait) supervision;contact guard  -MW     Distance in Feet (Gait) 400  -MW     Deviations/Abnormal Patterns (Gait) left sided deviations;antalgic;gait speed decreased;stride length decreased;weight shifting decreased  -MW     Bilateral Gait Deviations heel strike decreased  -MW     Left Sided Gait Deviations weight shift ability decreased  -MW     Emery Level (Stairs) verbal cues;contact guard;supervision  -MW     Handrail Location (Stairs) both sides  -MW     Ascending Technique (Stairs) step-to-step  -MW     Descending Technique (Stairs) step-to-step  -MW     Stairs, Impairments strength decreased;pain  -MW     Comment, (Gait/Stairs) Pt reports intermittent LOB  when turning her head, moves slow and cautious  -MW           User Key  (r) = Recorded By, (t) = Taken By, (c) = Cosigned By    Initials Name Provider Type    Christiane Chaidez PT Physical Therapist               Obj/Interventions     Row Name 12/09/22 1213          Range of Motion Comprehensive    General Range of Motion bilateral lower extremity ROM WFL  -MW     Comment, General Range of Motion BLE wrapped  -MW     Row Name 12/09/22 1213          Strength Comprehensive (MMT)    General Manual Muscle Testing (MMT) Assessment lower extremity strength deficits identified  -MW     Comment, General Manual Muscle Testing (MMT) Assessment BLE grossly 4-/5  -MW     Row Name 12/09/22 1213          Balance    Balance Assessment sitting static balance;sitting dynamic balance;standing static balance;standing dynamic balance  -MW     Static Sitting Balance modified independence  -MW     Dynamic Sitting Balance modified independence  -MW     Position, Sitting Balance unsupported  -MW     Static Standing Balance standby assist  -MW     Dynamic Standing Balance supervision;contact guard  -MW     Position/Device Used, Standing Balance unsupported  -MW     Balance Interventions sitting;standing;sit to stand;static;dynamic  -MW     Row Name 12/09/22 1213          Sensory Assessment (Somatosensory)    Sensory Assessment (Somatosensory) other (see comments);LE sensation intact  Pt reports h/o neuropathy  -MW           User Key  (r) = Recorded By, (t) = Taken By, (c) = Cosigned By    Initials Name Provider Type    Christiane Chaidez PT Physical Therapist               Goals/Plan     Row Name 12/09/22 1229          Bed Mobility Goal 1 (PT)    Activity/Assistive Device (Bed Mobility Goal 1, PT) bed mobility activities, all  -MW     [place] Level/Cues Needed (Bed Mobility Goal 1, PT) modified independence  -MW     Time Frame (Bed Mobility Goal 1, PT) 1 week  -MW     Row Name 12/09/22 1229          Transfer Goal 1 (PT)     Activity/Assistive Device (Transfer Goal 1, PT) transfers, all  -MW     Coffee Level/Cues Needed (Transfer Goal 1, PT) modified independence  -MW     Time Frame (Transfer Goal 1, PT) 1 week  -MW     Row Name 12/09/22 1229          Gait Training Goal 1 (PT)    Activity/Assistive Device (Gait Training Goal 1, PT) gait (walking locomotion);assistive device use;cane, straight  -MW     [place]offee Level (Gait Training Goal 1, PT) modified independence  -MW     Distance (Gait Training Goal 1, PT) 400  -MW     Time Frame (Gait Training Goal 1, PT) 1 week  -MW     Row Name 12/09/22 1229          Stairs Goal 1 (PT)    Activity/Assistive Device (Stairs Goal 1, PT) stairs, all skills;cane, straight  -MW     [place]offee Level/Cues Needed (Stairs Goal 1, PT) modified independence  -MW     Number of Stairs (Stairs Goal 1, PT) 4  -MW     Time Frame (Stairs Goal 1, PT) 1 week  -MW     Row Name 12/09/22 1229          Therapy Assessment/Plan (PT)    Planned Therapy Interventions (PT) balance training;bed mobility training;gait training;home exercise program;patient/family education;strengthening;stair training;transfer training  -MW           User Key  (r) = Recorded By, (t) = Taken By, (c) = Cosigned By    Initials Name Provider Type    MW Christiane Lundberg, PT Physical Therapist               Clinical Impression     Row Name 12/09/22 1100          Pain    Pretreatment Pain Rating 1/10  -MW     Posttreatment Pain Rating 0/10 - no pain  -MW     Pain Location - Side/Orientation Bilateral  -MW     Pain Location lower  -MW     Pain Location - extremity  -MW     Pain Intervention(s) Repositioned;Ambulation/increased activity;Rest  -MW     Row Name 12/09/22 1100          Plan of Care Review    Plan of Care Reviewed With patient  -MW     Outcome Evaluation Patient is a 66 yo female with PMH of morbid obesity, hypertension, GERD, hypothyroidism, type 2 diabetes mellitus, cellulitis (BLE), rheumatoid arthritis, falls, and neuropathy who  was admitted with for cellulitis and bilateral lower extremity wound infection on anterior aspect. She lives with her ill spouse for whom she cares for and is a  by profession. She is independent with mobility at baseline without AD. She has a h/o frequent falls. Today she was able to ambulate x 400 with CG/Sup A and negotiate 4 steps CGA. No overt LOB or unsteadiness. Recommended that pt use a cane or RW for mobility due to her h/o falls and impaired strength. 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  -     Row Name 12/09/22 1100          Therapy Assessment/Plan (PT)    Rehab Potential (PT) good, to achieve stated therapy goals  -     Criteria for Skilled Interventions Met (PT) yes;skilled treatment is necessary  -MW     Therapy Frequency (PT) 2 times/wk  -MW     Predicted Duration of Therapy Intervention (PT) 1 week  -     Row Name 12/09/22 1100          Vital Signs    O2 Delivery Pre Treatment room air  -MW     O2 Delivery Intra Treatment room air  -MW     O2 Delivery Post Treatment room air  -MW     Pre Patient Position Sitting  -MW     Intra Patient Position Standing  -MW     Post Patient Position Sitting  -MW     Row Name 12/09/22 1100          Positioning and Restraints    Pre-Treatment Position in bed  -MW     Post Treatment Position chair  -MW     In Chair reclined;sitting;call light within reach;encouraged to call for assist;notified nsg  Needs met, lines intact, RN notified of redness at IV site  -           User Key  (r) = Recorded By, (t) = Taken By, (c) = Cosigned By    Initials Name Provider Type    Christiane Chaidez, PT Physical Therapist               Outcome Measures     Row Name 12/09/22 1231          How much help from another person do you currently need...    Turning from your back to your side while in flat bed without using bedrails?  4  -MW     Moving from lying on back to sitting on the side of a flat bed without bedrails? 4  -MW     Moving to and from a bed to a chair (including a wheelchair)? 4  -MW     Standing up from a chair using your arms (e.g., wheelchair, bedside chair)? 3  -MW     Climbing 3-5 steps with a railing? 3  -MW     To walk in hospital room? 4  -MW     AM-PAC 6 Clicks Score (PT) 22  -MW     Highest level of mobility 7 --> Walked 25 feet or more  -MW     Row Name 12/09/22 1231          Functional Assessment    Outcome Measure Options AM-PAC 6 Clicks Basic Mobility (PT)  -           User Key  (r) = Recorded By, (t) = Taken By, (c) = Cosigned By    Initials Name Provider Type    Christiane Chaidez, PT Physical Therapist                             Physical Therapy Education     Title: PT OT SLP Therapies (In Progress)     Topic: Physical Therapy (In Progress)     Point: Mobility training (Done)     Learning Progress Summary           Patient Acceptance, E, VU,NR by  at 12/9/2022 1231    Comment: Standard cane or walker recommended for safety with mobilty                   Point: Home exercise program (Not Started)     Learner Progress:  Not documented in this visit.          Point: Body mechanics (Done)     Learning Progress Summary           Patient Acceptance, E, VU,NR by  at 12/9/2022 1231    Comment: Standard cane or walker recommended for safety with mobilty                   Point: Precautions (Done)     Learning Progress Summary           Patient Acceptance, E, VU,NR by  at 12/9/2022 1231    Comment: Standard cane or walker recommended for safety with mobilty                               User Key     Initials Effective Dates Name Provider Type Discipline     06/16/21 -  Christiane Lundberg, [place] Physical Therapist PT              PT Recommendation and Plan  Planned Therapy Interventions (PT): balance training, bed mobility training, gait training, home exercise program, patient/family education, strengthening, stair  training, transfer training  Plan of Care Reviewed With: patient  Outcome Evaluation: Patient is a 66 yo female with PMH of morbid obesity, hypertension, GERD, hypothyroidism, type 2 diabetes mellitus, cellulitis (BLE), rheumatoid arthritis, falls, and neuropathy who was admitted with for cellulitis and bilateral lower extremity wound infection on anterior aspect. She lives with her ill spouse for whom she cares for and is a  by profession. She is independent with mobility at baseline without AD. She has a h/o frequent falls. Today she was able to ambulate x 400 with CG/Sup A and negotiate 4 steps CGA. No overt LOB or unsteadiness. Recommended that pt use a cane or RW for mobility due to her h/o falls and impaired strength. 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     Time Calculation:    PT Charges     Row Name 12/09/22 1233 12/09/22 1101          Time Calculation    Start Time 1029  -MW --     Stop Time 1112  -MW 1101  -MW     Time Calculation (min) 43 min  -MW --     PT Received On 12/09/22  -MW 12/09/22  -MW     PT - Next Appointment 12/12/22  -KOFI --     PT Goal Re-Cert Due Date 12/23/22  -MW --        Time Calculation- PT    Total Timed Code Minutes- PT 40 minute(s)  -MW --           User Key  (r) = Recorded By, (t) = Taken By, (c) = Cosigned By    Initials Name Provider Type    Christiane Chaidez PT Physical Therapist              Therapy Charges for Today     Code Description Service Date Service Provider Modifiers Qty    34696747504 HC PT EVAL LOW COMPLEXITY 2 12/9/2022 Christiane Lundberg, PT GP 1    81205753276 HC PT THERAPEUTIC ACT EA 15 MIN 12/9/2022 Christiane Lundberg, PT GP 1    29946094746 HC PT THER PROC EA 15 MIN 12/9/2022 Christiane Lundberg, PT GP 1    48039663507 HC PT THERAPEUTIC ACT EA 15 MIN 12/9/2022 Christiane Lundberg, PT GP 1          PT G-Codes  Outcome  Measure Options: AM-PAC 6 Clicks Basic Mobility (PT)  AM-PAC 6 Clicks Score (PT): 22  PT Discharge Summary  Anticipated Discharge Disposition (PT): home with home health, home with outpatient therapy services    Christiane Lundberg, PT  12/9/2022

## 2022-12-09 NOTE — NURSING NOTE
12/09/22 0901   Wound 12/08/22 1105 Right lower leg   Placement Date/Time: 12/08/22 1105   Present on Hospital Admission: Yes  Side: Right  Orientation: lower  Location: leg   Wound Image    Base granulating;moist;pink;yellow  (minimal yellow tissue remaining in wound base)   Red (%), Wound Tissue Color 90   Yellow (%), Wound Tissue Color 10   Periwound intact;edematous;pink   Periwound Temperature warm   Periwound Skin Turgor soft   Drainage Characteristics/Odor serosanguineous   Drainage Amount moderate   Care, Wound cleansed with;soap and water;sterile normal saline;honey applied   Dressing Care dressing changed  (Opticel ag with small amount of Thera Honey applied to wound base. Leg was wrapped with Co-flex 2 layer compression wrap toes to knee.)   Periwound Care dry periwound area maintained   Wound 12/08/22 1105 Left lower leg   Placement Date/Time: 12/08/22 1105   Present on Hospital Admission: Yes  Side: Left  Orientation: lower  Location: leg   Wound Image    Base clean;granulating;moist;red;pink   Periwound intact;edematous;redness;swelling  (less redness noted than yesterday)   Periwound Temperature warm   Periwound Skin Turgor soft   Drainage Characteristics/Odor serosanguineous   Drainage Amount moderate   Care, Wound cleansed with;soap and water;sterile normal saline;honey applied   Dressing Care dressing changed  (Opticel ag with small amount of Thera Honey applied to wound base. Leg was wrapped with Co-flex 2 layer compression wrap toes to knee.)     CWON note: pt seen for follow up dressing change to denzel LE. Pt continues to await ID to see her, but legs with slightly less redness than yesterday. Wounds have less fibrinous tissue as well, they are much  today. Opticel ag with Thera Honey was again used on the wounds, but I wrapped her legs with Co-flex 2 layer compression dressings today.     Pt may benefit from being fitted with custom compression stockings, as she has some neuropathy with  leg pain,  and is unable to tolerate standard compression stockings. I discussed this with her briefly, may need a prescription from the MD to have her legs measured and fitted with the correct mmHG compression.

## 2022-12-09 NOTE — PLAN OF CARE
Goal Outcome Evaluation:  Plan of Care Reviewed With: patient        Progress: improving  Outcome Evaluation: Pt. placed on contact/spore precautions for R/O C.Diff. Pt. did not have a BM overnight, stool sample still pending. No complaints of pain and no PRNs given. BLE's elevated on pillows overnight. Pt. rounded on frequently with all needs met.

## 2022-12-09 NOTE — PROGRESS NOTES
Name: Angelina Narayan ADMIT: 2022   : 1955  PCP: Lesly Anthony, CHETNA    MRN: [id number] LOS: 0 days   AGE/SEX: 67 y.o. female  ROOM: [facility]     Subjective   Subjective   Patient reports decrease in the pain and swelling of both lower extremities.  Both legs are wrapped with pressure dressing at this time.  No fever or chills.  No chest pain/no palpitations/no shortness of breath/no cough.    Review of Systems  GI.  No abdominal pain or nausea or vomiting.  Resolved diarrhea with normal bowel movement and no fresh bright blood per rectum or melena.  .  No dysuria or hematuria.       Objective   Objective   Vital Signs  Temp:  [97.1 °F (36.2 °C)-98.2 °F (36.8 °C)] 97.1 °F (36.2 °C)  Heart Rate:  [72-89] 85  Resp:  [16-18] 16  BP: (101-142)/(62-85) 124/79  SpO2:  [91 %-95 %] 95 %  on   ;   Device (Oxygen Therapy): room air    Intake/Output Summary (Last 24 hours) at 2022 1653  Last data filed at 2022 0250  Gross per 24 hour   Intake 170 ml   Output --   Net 170 ml     Body mass index is 41.1 kg/m².      22  0814 22  1840   Weight: 112 kg (247 lb) 112 kg (247 lb)     Physical Exam  General.  Middle-aged female.  Alert and oriented x3.  Obese.  In no apparent pain/distress/diaphoresis.  Normal mood and affect.  Eyes.  Pupils equal round and reactive.  Intact extraocular musculature.  No pallor or jaundice.  Normal conjunctive a and lids.  Oral cavity.  Moist mucous membrane.  Neck.  Supple.  No JVD.  No lymphadenopathy or thyromegaly.  Cardiovascular.  Regular rate and rhythm with no gallops or murmurs.    Chest.  Clear to auscultation bilaterally with no added sounds.  Abdomen.  Soft lax.  No tenderness.  No organomegaly.  No guarding or rebound.  Extremities.    Pressure dressing on both legs.        Results Review:      Results from last 7 days   Lab Units 22  0501 22  0809   SODIUM mmol/L 138 141   POTASSIUM mmol/L 4.1 3.7   CHLORIDE mmol/L 104 105   CO2 mmol/L 24.1  28.3   BUN mg/dL 6* 9   CREATININE mg/dL 0.54* 0.71   GLUCOSE mg/dL 92 84   CALCIUM mg/dL 8.4* 8.9   AST (SGOT) U/L  --  11   ALT (SGPT) U/L  --  9     Estimated Creatinine Clearance: 129.5 mL/min (A) (by C-G formula based on SCr of 0.54 mg/dL (L)).  Results from last 7 days   Lab Units 12/09/22  0501   HEMOGLOBIN A1C % 5.80*     Results from last 7 days   Lab Units 12/09/22  1118 12/09/22  0646   GLUCOSE mg/dL 121 79             Results from last 7 days   Lab Units 12/08/22  0809   TSH uIU/mL 2.180           Invalid input(s):  PHOS        Invalid input(s): LDLCALC  Results from last 7 days   Lab Units 12/09/22  0501 12/08/22  0809   WBC 10*3/mm3 6.00 7.50   HEMOGLOBIN g/dL 12.6 12.9   HEMATOCRIT % 37.6 39.0   PLATELETS 10*3/mm3 217 236   MCV fL 89.7 91.3   MCH pg 30.1 30.2   MCHC g/dL 33.5 33.1   RDW % 11.9* 12.2*   RDW-SD fl 38.6 40.8   MPV fL 10.5 10.4   NEUTROPHIL % % 58.5 68.4   LYMPHOCYTE % % 27.5 18.9*   MONOCYTES % % 9.8 10.0   EOSINOPHIL % % 2.5 1.5   BASOPHIL % % 1.2 0.8   IMM GRAN % % 0.5 0.4   NEUTROS ABS 10*3/mm3 3.51 5.13   LYMPHS ABS 10*3/mm3 1.65 1.42   MONOS ABS 10*3/mm3 0.59 0.75   EOS ABS 10*3/mm3 0.15 0.11   BASOS ABS 10*3/mm3 0.07 0.06   IMMATURE GRANS (ABS) 10*3/mm3 0.03 0.03   NRBC /100 WBC 0.0 0.0             Results from last 7 days   Lab Units 12/08/22  0809   LACTATE mmol/L 1.2     Results from last 7 days   Lab Units 12/09/22  0501 12/08/22  0809   SED RATE mm/hr 30  --    CRP mg/dL  --  2.13*         Results from last 7 days   Lab Units 12/08/22  0942 12/08/22  0940 12/08/22  0937 12/08/22  0809   BLOODCX   --   --  No growth at 24 hours No growth at 24 hours   WOUNDCX  Heavy growth (4+) Pseudomonas species* Heavy growth (4+) Pseudomonas species*  --   --                    Imaging:  Imaging Results (Last 24 Hours)     ** No results found for the last 24 hours. **             I reviewed the patient's new clinical results / labs / tests / procedures      Assessment/Plan     Active  Hospital Problems    Diagnosis  POA   • **Cellulitis [L03.90]  Yes   • Wound infection, BLE [T14.8XXA, L08.9]  Yes   • Diarrhea of presumed infectious origin [R19.7]  Yes   • Neuropathy [G62.9]  Yes   • Obesity, Class III, BMI 40-49.9 (morbid obesity) ([facility]) [E66.01]  Yes   • Type 2 diabetes mellitus with obesity ([facility]) [E11.69, E66.9]  Yes   • Hypothyroidism [E03.9]  Yes   • Edema [R60.9]  Yes   • Rheumatoid arthritis ([facility]) [M06.9]  Yes      Resolved Hospital Problems   No resolved problems to display.         1.  Cellulitis and infected bilateral lower extremity wounds in a patient with a history of lower extremity venous insufficiency.  Failed outpatient treatment.  Wound growing Pseudomonas but infectious disease believes that this is mostly a contamination and that the patient problem is mostly limited and lower extremity venous insufficiency.   Blood cultures are negative.  No fever or leukocytosis. Wound nurse for local care.    Lower extremity venous ultrasound revealed no DVT.  2D echo revealed normal ejection fraction and diastolic dysfunction and no significant valvular disease.  Recommendation per ID is pressured dressing and follow-up if worsening then treatment of Pseudomonas might be indicated.  Infectious disease stopped antibiotic.   2.  Diarrhea. Benign GI examination.  Resolved.  No stool specimens applicable for stool studies.  3.  Diet-controlled type 2 diabetes.  A1c 5.8.  Continue sliding scale insulin while in the hospital.  4.  Hypothyroidism.  Continue current replacement.  Clinically euthyroid.  Normal TSH  5.  History of rheumatoid arthritis.  Continue Plaquenil.  No synovitis.  6.  Restless leg syndrome/obstructive sleep apnea.  Continue CPAP  and continue Mirapex.  7.  Obesity.  Status post gastric sleeve.  Normal TSH.  8.  DVT prophylaxis with subcutaneous heparin.    Discussed my findings and plan of treatment with the patient/nurse.  Disposition.  Home tomorrow with continuation of the  pressure dressing of both lower extremity and wound center follow-up as scheduled      Ne Davis MD  [facility]  12/09/22  16:53 EST

## 2022-12-09 NOTE — CONSULTS
Referring Provider: DANETTE GODOY  Subjective   History of present illness: This very nice 67-year-old we are asked for evaluation opinion regarding cellulitis.  Briefly she says she has been developing fluid retention, erythema and swelling in her bilateral lower extremities since May or 2022.  She has been on multiple courses of antibiotics including doxycycline and clindamycin.  These have not helped.  She denies any associated fevers or chills or night sweats.  She has been in wound care clinic and was admitted to receive IV antibiotics.  Her white count is normal.  She says she has not done compression in the past.  Wound cultures are growing 4+ Pseudomonas on her bilateral ulcers.  She says she has mainly bloody drainage from the wounds.  She has been on vancomycin and ceftriaxone along with compression.  '  Wound care notes reviewed and shows granulating moist pink wound with serosanguineous drainage bilaterally    Past Medical History:   Diagnosis Date   • Anemia    • Cellulitis 2022   • DDD (degenerative disc disease), lumbar    • Diabetes mellitus (HCC)    • Disease of thyroid gland     HYPOTHYROID   • Fatigue    • GERD (gastroesophageal reflux disease)    • Heartburn    • Hiatal hernia    • Hypertension    • Joint pain    • RIVERA (obstructive sleep apnea)     CPAP   • RLS (restless legs syndrome)        Past Surgical History:   Procedure Laterality Date   •  SECTION     • COLONOSCOPY     • DILATATION AND CURETTAGE     • ENDOSCOPY     • GASTRIC SLEEVE LAPAROSCOPIC N/A 2017    Procedure: GASTRIC SLEEVE LAPAROSCOPIC WITH HIATAL HERNIA REPIAR, LYSIS OF ADHESIONS;  Surgeon: Job Douglass Jr., MD;  Location: [facility] OR [facility];  Service:    • HYSTERECTOMY     • KNEE ARTHROSCOPY     • LAPAROSCOPIC CHOLECYSTECTOMY     • SKIN GRAFT     • TONSILLECTOMY     • TUBAL ABDOMINAL LIGATION         family history includes Breast cancer in her maternal aunt; Cancer in her brother; Diabetes in her mother;  Heart attack in her brother and paternal grandmother; Heart disease in her brother and father; Hypertension in her brother, father, and mother; Obesity in her mother; Sleep apnea in her brother and mother; Stroke in her father.     reports that she has never smoked. She has never used smokeless tobacco. She reports that she does not drink alcohol and does not use drugs.    Allergies   Allergen Reactions   • Morphine And Related Itching   • Sulfa Antibiotics Itching   • Toradol [Ketorolac Tromethamine] Itching   • Adhesive Tape Rash       Medication:  Antibiotics:  Anti-Infectives (From admission, onward)    Ordered     Dose/Rate Route Frequency Start Stop    12/08/22 1551  vancomycin (VANCOCIN) 1000 mg/200 mL dextrose 5% IVPB        Ordering Provider: Cristiano Joyce APRN    1,000 mg  over 60 Minutes Intravenous Every 12 Hours 12/08/22 2200 12/13/22 0959    12/08/22 1846  hydroxychloroquine (PLAQUENIL) tablet 200 mg        Ordering Provider: Ne Davis MD    200 mg Oral Daily 12/08/22 2100      12/08/22 1529  Pharmacy to dose vancomycin        Ordering Provider: Cristiano Joyce APRN     Does not apply Continuous PRN 12/08/22 1529 12/13/22 1528    12/08/22 1409  cefTRIAXone (ROCEPHIN) 1 g in sodium chloride 0.9 % 100 mL IVPB-VTB        Ordering Provider: Ne Davis MD    1 g  200 mL/hr over 30 Minutes Intravenous Every 24 Hours 12/08/22 1411 12/13/22 1407    12/08/22 0742  vancomycin 2250 mg/500 mL 0.9% NS IVPB ([facility])        Ordering Provider: Sumit Hansen MD    20 mg/kg × 116 kg Intravenous Once 12/08/22 0744 12/08/22 1236          Review of Systems  Pertinent items are noted in HPI, all other systems reviewed and negative    Objective     Physical Exam:   Vital Signs   Temp:  [97.4 °F (36.3 °C)-98.3 °F (36.8 °C)] 97.4 °F (36.3 °C)  Heart Rate:  [72-89] 73  Resp:  [16-18] 18  BP: (101-142)/(62-85) 142/85    GENERAL: Awake and alert, in no acute distress.   HEENT: Oropharynx is clear. Hearing is  grossly normal.   EYES: PERRL. No conjunctival injection. No lid lag.   LYMPHATICS: No lymphadenopathy of the neck or inguinal regions.   HEART: Regular rate and regular rhythm. 3+ peripheral edema.   LUNGS: Clear to auscultation anteriorly with normal respiratory effort.   GI: Soft, nontender, nondistended. No appreciable organomegaly.   SKIN: Warm and dry without cutaneous eruptions in exposed areas (legs wrapped in compression dressings)  PSYCHIATRIC: Appropriate mood, affect, insight, and judgment.     Results Review:  WBC 6, hgb 12.6,   Cr 0.54  12/8/2022 wound cultures 1+ Pseudomonas  Blood cultures no growth to date    A/p  1.  Bilateral lower extremity wounds/venous stasis ulcers and venous insufficiency  2.  Lymphedema    I think this is mainly an issue with fluid retention and lymphedema.  She is not having fevers or leukocytosis to suggest infection.  By her report she has had cellulitis since June 2022 which I think is probably venous stasis dermatitis.  I discussed with her that I think the issue is that she has too much fluid on her lower extremities and would recommend some type of compression therapy to help mobilize the fluid.  I think that we will allow the skin to decompress and the ulcers to heal.  She is growing 4+ Pseudomonas on her bilateral wounds, and this is a very frequent colonizer.  By report she has improved with local wound care and on antibiotics that would not be effective against Pseudomonas.  Therefore I think the Pseudomonas is probably just a colonizer.  We will stop her antibiotics after today's doses.  If she does not improve with compression and wound care, we can always reconsider treating the Pseudomonas but I doubt it would come to that.    Thank you for this consult.  We will be available should infectious concerns evolve      Reinier Rowley MD  12/09/22  12:21 EST

## 2022-12-09 NOTE — PLAN OF CARE
Goal Outcome Evaluation:  Plan of Care Reviewed With: patient           Outcome Evaluation: Patient is a 66 yo female with PMH of morbid obesity, hypertension, GERD, hypothyroidism, type 2 diabetes mellitus, cellulitis (BLE), rheumatoid arthritis, falls, and neuropathy who was admitted with for cellulitis and bilateral lower extremity wound infection on anterior aspect. She lives with her ill spouse for whom she cares for and is a  by profession. She is independent with mobility at baseline without AD. She has a h/o frequent falls. Today she was able to ambulate x 400 with CG/Sup A and negotiate 4 steps CGA. No overt LOB or unsteadiness. Recommended that pt use a cane or RW for mobility due to her h/o falls and impaired strength. 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  ..Patient was intermittently wearing a face mask during this therapy encounter. Therapist used appropriate personal protective equipment including gown, mask and gloves.  Mask used was standard procedure mask. Appropriate PPE was worn during the entire therapy session. Hand hygiene was completed before and after therapy session. Patient is not in enhanced droplet precautions.

## 2022-12-10 ENCOUNTER — READMISSION MANAGEMENT (OUTPATIENT)
Dept: CALL CENTER | Facility: HOSPITAL | Age: 67
End: 2022-12-10

## 2022-12-10 VITALS
HEIGHT: 65 IN | HEART RATE: 76 BPM | DIASTOLIC BLOOD PRESSURE: 76 MMHG | SYSTOLIC BLOOD PRESSURE: 120 MMHG | RESPIRATION RATE: 16 BRPM | BODY MASS INDEX: 41.15 KG/M2 | OXYGEN SATURATION: 96 % | TEMPERATURE: 97.4 F | WEIGHT: 247 LBS

## 2022-12-10 LAB
ANION GAP SERPL CALCULATED.3IONS-SCNC: 6.8 MMOL/L (ref 5–15)
BACTERIA SPEC AEROBE CULT: ABNORMAL
BACTERIA SPEC AEROBE CULT: ABNORMAL
BASOPHILS # BLD AUTO: 0.08 10*3/MM3 (ref 0–0.2)
BASOPHILS NFR BLD AUTO: 1.3 % (ref 0–1.5)
BUN SERPL-MCNC: 6 MG/DL (ref 8–23)
BUN/CREAT SERPL: 9 (ref 7–25)
CALCIUM SPEC-SCNC: 8.7 MG/DL (ref 8.6–10.5)
CHLORIDE SERPL-SCNC: 105 MMOL/L (ref 98–107)
CO2 SERPL-SCNC: 28.2 MMOL/L (ref 22–29)
CREAT SERPL-MCNC: 0.67 MG/DL (ref 0.57–1)
DEPRECATED RDW RBC AUTO: 38 FL (ref 37–54)
EGFRCR SERPLBLD CKD-EPI 2021: 97.1 ML/MIN/1.73
EOSINOPHIL # BLD AUTO: 0.17 10*3/MM3 (ref 0–0.4)
EOSINOPHIL NFR BLD AUTO: 2.7 % (ref 0.3–6.2)
ERYTHROCYTE [DISTWIDTH] IN BLOOD BY AUTOMATED COUNT: 11.8 % (ref 12.3–15.4)
GLUCOSE BLDC GLUCOMTR-MCNC: 91 MG/DL (ref 70–130)
GLUCOSE BLDC GLUCOMTR-MCNC: 94 MG/DL (ref 70–130)
GLUCOSE BLDC GLUCOMTR-MCNC: 95 MG/DL (ref 70–130)
GLUCOSE SERPL-MCNC: 96 MG/DL (ref 65–99)
GRAM STN SPEC: ABNORMAL
HCT VFR BLD AUTO: 40.1 % (ref 34–46.6)
HGB BLD-MCNC: 13.2 G/DL (ref 12–15.9)
IMM GRANULOCYTES # BLD AUTO: 0.03 10*3/MM3 (ref 0–0.05)
IMM GRANULOCYTES NFR BLD AUTO: 0.5 % (ref 0–0.5)
LYMPHOCYTES # BLD AUTO: 2.21 10*3/MM3 (ref 0.7–3.1)
LYMPHOCYTES NFR BLD AUTO: 34.9 % (ref 19.6–45.3)
MCH RBC QN AUTO: 29.1 PG (ref 26.6–33)
MCHC RBC AUTO-ENTMCNC: 32.9 G/DL (ref 31.5–35.7)
MCV RBC AUTO: 88.3 FL (ref 79–97)
MONOCYTES # BLD AUTO: 0.57 10*3/MM3 (ref 0.1–0.9)
MONOCYTES NFR BLD AUTO: 9 % (ref 5–12)
NEUTROPHILS NFR BLD AUTO: 3.28 10*3/MM3 (ref 1.7–7)
NEUTROPHILS NFR BLD AUTO: 51.6 % (ref 42.7–76)
NRBC BLD AUTO-RTO: 0 /100 WBC (ref 0–0.2)
PLATELET # BLD AUTO: 250 10*3/MM3 (ref 140–450)
PMV BLD AUTO: 10.4 FL (ref 6–12)
POTASSIUM SERPL-SCNC: 3.8 MMOL/L (ref 3.5–5.2)
RBC # BLD AUTO: 4.54 10*6/MM3 (ref 3.77–5.28)
SODIUM SERPL-SCNC: 140 MMOL/L (ref 136–145)
VANCOMYCIN TROUGH SERPL-MCNC: 11.5 MCG/ML (ref 5–20)
WBC NRBC COR # BLD: 6.34 10*3/MM3 (ref 3.4–10.8)

## 2022-12-10 PROCEDURE — 80048 BASIC METABOLIC PNL TOTAL CA: CPT | Performed by: INTERNAL MEDICINE

## 2022-12-10 PROCEDURE — 36415 COLL VENOUS BLD VENIPUNCTURE: CPT | Performed by: INTERNAL MEDICINE

## 2022-12-10 PROCEDURE — G0378 HOSPITAL OBSERVATION PER HR: HCPCS

## 2022-12-10 PROCEDURE — 25010000002 VANCOMYCIN PER 500 MG: Performed by: NURSE PRACTITIONER

## 2022-12-10 PROCEDURE — 85025 COMPLETE CBC W/AUTO DIFF WBC: CPT | Performed by: INTERNAL MEDICINE

## 2022-12-10 PROCEDURE — 80202 ASSAY OF VANCOMYCIN: CPT | Performed by: NURSE PRACTITIONER

## 2022-12-10 PROCEDURE — 25010000002 HEPARIN (PORCINE) PER 1000 UNITS: Performed by: NURSE PRACTITIONER

## 2022-12-10 PROCEDURE — 96372 THER/PROPH/DIAG INJ SC/IM: CPT

## 2022-12-10 PROCEDURE — 82962 GLUCOSE BLOOD TEST: CPT

## 2022-12-10 RX ADMIN — HEPARIN SODIUM 5000 UNITS: 5000 INJECTION INTRAVENOUS; SUBCUTANEOUS at 08:00

## 2022-12-10 RX ADMIN — HYDROXYCHLOROQUINE SULFATE 200 MG: 200 TABLET, FILM COATED ORAL at 08:01

## 2022-12-10 RX ADMIN — VANCOMYCIN HYDROCHLORIDE 1000 MG: 1 INJECTION, SOLUTION INTRAVENOUS at 12:00

## 2022-12-10 RX ADMIN — Medication 10 ML: at 08:01

## 2022-12-10 NOTE — DISCHARGE SUMMARY
[facility]    Date of Discharge:  12/10/2022    PCP: Raj April, NP-C    Discharge Diagnosis:   Active Hospital Problems    Diagnosis  POA   • **Cellulitis [L03.90]  Yes   • Wound infection, BLE [T14.8XXA, L08.9]  Yes   • Diarrhea of presumed infectious origin [R19.7]  Yes   • Neuropathy [G62.9]  Yes   • Obesity, Class III, BMI 40-49.9 (morbid obesity) ([facility]) [E66.01]  Yes   • Type 2 diabetes mellitus with obesity ([facility]) [E11.69, E66.9]  Yes   • Hypothyroidism [E03.9]  Yes   • Edema [R60.9]  Yes   • Rheumatoid arthritis ([facility]) [M06.9]  Yes      Resolved Hospital Problems   No resolved problems to display.          Consults     Date and Time Order Name Status Description    12/8/2022 10:41 AM Inpatient Infectious Diseases Consult Completed     12/8/2022  9:06 AM [facility] (on-call MD unless specified) Details          Hospital Course  67 y.o. female with past medical history significant for type 2 diabetes, hypothyroidism, rheumatoid arthritis, obesity, presents to the hospital, has cellulitis and infected bilateral lower extremity wounds, has underlying history of lower extremity venous insufficiency.  Patient had failed outpatient treatment,  Wound growing Pseudomonas but infectious disease believes that this is mostly a contamination and that the patient problem is mostly limited and lower extremity venous insufficiency.   Blood cultures are negative.  No fever or leukocytosis. Wound nurse for local care.    Lower extremity venous ultrasound revealed no DVT.  2D echo revealed normal ejection fraction and diastolic dysfunction and no significant valvular disease.  Recommendation per ID is pressured dressing and follow-up if worsening then treatment of Pseudomonas might be indicated.  Infectious disease stopped antibiotic.  Patient is medically cleared for discharge today.  I have seen and examined patient at bedside, total time spent is more than 30  minutes.        Condition on Discharge: Improved.     Temp:  [97.2 °F (36.2 °C)-98.6 °F (37 °C)] 97.2 °F (36.2 °C)  Heart Rate:  [70-75] 72  Resp:  [16] 16  BP: (116-137)/(66-75) 120/74  Body mass index is 41.1 kg/m².    Physical Exam   General.  Middle-aged female.  Alert and oriented x3.  Obese.  In no apparent pain/distress/diaphoresis.  Normal mood and affect.  Eyes.  Pupils equal round and reactive.  Intact extraocular musculature.  No pallor or jaundice.  Normal conjunctive a and lids.  Oral cavity.  Moist mucous membrane.  Neck.  Supple.  No JVD.  No lymphadenopathy or thyromegaly.  Cardiovascular.  Regular rate and rhythm with no gallops or murmurs.    Chest.  Clear to auscultation bilaterally with no added sounds.  Abdomen.  Soft lax.  No tenderness.  No organomegaly.  No guarding or rebound.  Extremities.    Pressure dressing on both legs.         Disposition: Home or Self Care       Discharge Medications      Continue These Medications      Instructions Start Date   Actemra 80 MG/4ML solution injection  Generic drug: tocilizumab   Administer ACTEMRA 4mg/kg IV every 4 weeks      buPROPion  MG 24 hr tablet  Commonly known as: WELLBUTRIN XL   150 mg, Oral, Daily PRN      cetirizine 10 MG tablet  Commonly known as: zyrTEC   10 mg, Oral, As Needed      doxycycline 100 MG capsule  Commonly known as: MONODOX   100 mg, Oral, 2 Times Daily      Durolane 60 MG/3ML prefilled syringe injection  Generic drug: Sodium Hyaluronate   No dose, route, or frequency recorded.      hydroxychloroquine 200 MG tablet  Commonly known as: PLAQUENIL   200 mg, Oral, Daily      levothyroxine 75 MCG tablet  Commonly known as: SYNTHROID, LEVOTHROID   75 mcg, Oral, Daily      multivitamin with minerals tablet tablet   1 tablet, Oral, Daily      potassium chloride 20 MEQ CR tablet  Commonly known as: K-DUR,KLOR-CON   20 mEq, Oral, 2 Times Daily      pramipexole 0.5 MG tablet  Commonly known as: MIRAPEX   0 Refill(s)      SLOW-MAG  PO   1 tablet, Oral, Nightly      traZODone 50 MG tablet  Commonly known as: DESYREL   50 mg, Oral, Daily      Tylenol PM Extra Strength  MG tablet per tablet  Generic drug: diphenhydrAMINE-acetaminophen   2 tablets, Oral, Nightly PRN      vitamin D3 125 MCG (5000 UT) capsule capsule   5,000 Units, Oral, Daily              Additional Instructions for the Follow-ups that You Need to Schedule     Discharge Follow-up with PCP   As directed       Currently Documented PCP:    Lesly Anthony NP-C    PCP Phone Number:    [phone]     Follow Up Details: Follow-up with PCP in 7 days.            Follow-up Information     Lesly Anthony NP-C .    Specialty: Nurse Practitioner  Why: Follow-up with PCP in 7 days.  Contact information:  [address] [facility] [place] [address] [phone]                        Pending Labs     Order Current Status    Blood Culture - Blood, Arm, Left Preliminary result    Blood Culture - Blood, Arm, Right Preliminary result         Steffen Hammonds MD  [facility]  12/10/22    Discharge time spent greater than 30 minutes.

## 2022-12-10 NOTE — PLAN OF CARE
Goal Outcome Evaluation:  Plan of Care Reviewed With: patient        Progress: improving  Outcome Evaluation: VSS and pt. pleasent and cooperative with care this evening. Denied pain overnight and was able to sleep for most of the night. Expressed concerns for when she goes home as she is a  and cares for her  and is unsure how she will be able to keep her legs elevated as the doctor has instructed her to do. This RN passed along to dayshift RN to speak with case management about coordination of care and possible resources available to her.

## 2022-12-13 ENCOUNTER — READMISSION MANAGEMENT (OUTPATIENT)
Dept: CALL CENTER | Facility: HOSPITAL | Age: 67
End: 2022-12-13

## 2022-12-13 LAB
BACTERIA SPEC AEROBE CULT: NORMAL
BACTERIA SPEC AEROBE CULT: NORMAL

## 2022-12-13 NOTE — OUTREACH NOTE
"Medical Week 1 Survey    Flowsheet Row Responses   [facility] patient discharged from? [place]   Does the patient have one of the following disease processes/diagnoses(primary or secondary)? Other   Week 1 attempt successful? Yes   Call start time 0902   Call end time 0906   General alerts for this patient Daughter defers to patient for updates.   Person spoke with today (if not patient) and relationship Lizzie-daughter.   What is the patient's perception of their health status since discharge? Improving   Week 1 call completed? Yes   Wrap up additional comments Brief call with daughter. States patient's legs are \"much better\". States unsure of appts or medications, and will have patient call if has any questions/concerns.          MARY DAVENPORT - Registered Nurse  "

## 2022-12-22 ENCOUNTER — TRANSCRIBE ORDERS (OUTPATIENT)
Dept: ADMINISTRATIVE | Facility: HOSPITAL | Age: 67
End: 2022-12-22

## 2022-12-22 DIAGNOSIS — Z78.0 POST-MENOPAUSAL: Primary | ICD-10-CM

## 2022-12-27 ENCOUNTER — APPOINTMENT (OUTPATIENT)
Dept: BONE DENSITY | Facility: HOSPITAL | Age: 67
End: 2022-12-27

## 2022-12-27 DIAGNOSIS — Z78.0 POST-MENOPAUSAL: ICD-10-CM

## 2022-12-27 PROCEDURE — 77080 DXA BONE DENSITY AXIAL: CPT

## 2023-03-22 ENCOUNTER — OFFICE VISIT (OUTPATIENT)
Dept: SLEEP MEDICINE | Facility: HOSPITAL | Age: 68
End: 2023-03-22
Payer: MEDICARE

## 2023-03-22 VITALS
DIASTOLIC BLOOD PRESSURE: 82 MMHG | SYSTOLIC BLOOD PRESSURE: 132 MMHG | HEART RATE: 72 BPM | WEIGHT: 246 LBS | BODY MASS INDEX: 40.98 KG/M2 | HEIGHT: 65 IN | OXYGEN SATURATION: 96 %

## 2023-03-22 DIAGNOSIS — G47.33 OBSTRUCTIVE SLEEP APNEA: Primary | ICD-10-CM

## 2023-03-22 DIAGNOSIS — E66.01 OBESITY, MORBID, BMI 40.0-49.9: ICD-10-CM

## 2023-03-22 DIAGNOSIS — G25.81 RESTLESS LEGS SYNDROME (RLS): ICD-10-CM

## 2023-03-22 DIAGNOSIS — Z02.4 ENCOUNTER FOR CDL (COMMERCIAL DRIVING LICENSE) EXAM: ICD-10-CM

## 2023-03-22 PROCEDURE — G0463 HOSPITAL OUTPT CLINIC VISIT: HCPCS

## 2023-03-22 NOTE — PROGRESS NOTES
[facility]  Telephone: [phone] / Fax: [phone] [place]  Telephone: [phone] / Fax: [phone] Dang Perdomo    PCP: Lesly Anthony, NP-C    Reason for visit: RIVERA f/u    Angelina Narayan is a 67 y.o.female  was last seen at [facility] sleep lab in March 2022. 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 She gets enough sleep in general. She has no sleepiness while driving.  She has no problems tolerating the CPAP device. She needs updated headgear and updated nasal pillow mask. Machine controls the snoring and apneas.  Her bedtime is 9-10pm and awake time is 10am.    SH- no tobacco, no alcohol, 1-2 caffeine    ROS- +cough, rest is negative.    DME [facility]    Current Medications:    Current Outpatient Medications:   •  buPROPion XL (WELLBUTRIN XL) 150 MG 24 hr tablet, Take 150 mg by mouth Daily As Needed., Disp: , Rfl:   •  cetirizine (zyrTEC) 10 MG tablet, Take 10 mg by mouth As Needed., Disp: , Rfl:   •  diphenhydrAMINE-acetaminophen (Tylenol PM Extra Strength)  MG tablet per tablet, Take 2 tablets by mouth At Night As Needed., Disp: , Rfl:   •  doxycycline (MONODOX) 100 MG capsule, Take 1 capsule by mouth 2 (Two) Times a Day., Disp: 20 capsule, Rfl: 0  •  Durolane 60 MG/3ML prefilled syringe injection, , Disp: , Rfl:   •  hydroxychloroquine (PLAQUENIL) 200 MG tablet, Take 200 mg by mouth Daily., Disp: , Rfl:   •  levothyroxine (SYNTHROID, LEVOTHROID) 75 MCG tablet, Take 75 mcg by mouth Daily., Disp: , Rfl:   •  Magnesium Cl-Calcium Carbonate (SLOW-MAG PO), Take 1 tablet by mouth Every Night., Disp: , Rfl:   •  multivitamin with minerals tablet tablet, Take 1 tablet by mouth Daily., Disp: , Rfl:   •  potassium chloride (K-DUR,KLOR-CON) 20 MEQ CR tablet, Take 20 mEq by mouth 2 (Two) Times a Day., Disp: , Rfl:   •  pramipexole (MIRAPEX) 0.5 MG tablet,  0 Refill(s), Disp: , Rfl:   •  tocilizumab (Actemra) 80 MG/4ML solution  "injection, Administer ACTEMRA 4mg/kg IV every 4 weeks, Disp: , Rfl:   •  traZODone (DESYREL) 50 MG tablet, Take 50 mg by mouth Daily., Disp: , Rfl:   •  vitamin D3 125 MCG (5000 UT) capsule capsule, Take 5,000 Units by mouth Daily., Disp: , Rfl:    also entered in Sleep Questionnaire    Patient  has a past medical history of Anemia, Cellulitis (06/2022), DDD (degenerative disc disease), lumbar, Diabetes mellitus (HCC), Disease of thyroid gland, Fatigue, GERD (gastroesophageal reflux disease), Heartburn, Hiatal hernia, Hypertension, Joint pain, RIVERA (obstructive sleep apnea), and RLS (restless legs syndrome).    I have reviewed the Past Medical History, Past Surgical History, Social History and Family History.    Vital Signs /82   Pulse 72   Ht 165.1 cm (65\")   Wt 112 kg (246 lb)   SpO2 96%   BMI 40.94 kg/m²  Body mass index is 40.94 kg/m².    General Alert and oriented. No acute distress noted   Pharynx/Throat Class IV  Mallampati airway, large tongue, no evidence of redundant lateral pharyngeal tissue. No oral lesions. No thrush. Moist mucous membranes.   Head Normocephalic. Symmetrical. Atraumatic.    Nose No septal deviation. No drainage   Chest Wall Normal shape. Symmetric expansion with respiration. No tenderness.   Neck Trachea midline, no thyromegaly or adenopathy    Lungs Clear to auscultation bilaterally. No wheezes. No rhonchi. No rales. Respirations regular, even and unlabored.   Heart Regular rhythm and normal rate. Normal S1 and S2. No murmur   Abdomen Soft, non-tender and non-distended. Normal bowel sounds. No masses.   Extremities Moves all extremities well. No edema   Psychiatric Normal mood and affect.     Testing:  Download 12/21/22-3/20/23- 97% use with avg nightly use of 6 hours and 43 minutes on auto CPAP 5-15cm H2O, avg pr is 12cm H2O, AHI is 2.7/hr, leak is 7.9 L/min.    Study-Study-5/4/18-  Diagnostic findings: The patient tolerated the home sleep testing with monitoring time of 415 " minutes. The data obtained make this a technically adequate study. The apnea hypopneas index(AHI) was 24.5 per sleep hour.  The AHI during supine position was 26.8 per sleep hour. Mean heart rate of 60.3 BPM.  Snoring was noted 48.9% of sleep time. Lowest oxygen saturation during the study was 72%. Saturation below 89% was noted for 109 mins.      Titration 5/18/18-Overnight titration study from 9:21 PM to 4:06 AM.  Sleep efficiency 96%.  Normal sleep distribution.  AHI 0.3.  Adequate supine sleep and REM sleep.  Oxygen saturation remained above 88%.  No snoring.  CPAP increased from 5-9 cm water pressure.  Maximum sleep at 6 cm water pressure with maximum REM sleep at 8 cm water pressure.  Supine sleep only on the higher pressure settings. Study shows excellent sleep efficiency and normal sleep distribution with CPAP device.  Appropriate pressures 8 cm water pressure.  Home CPAP should be set to the same.    Impression:  1. Obstructive sleep apnea    2. Encounter for CDL (commercial driving license) exam    3. Obesity, morbid, BMI 40.0-49.9 ([facility])    4. Restless legs syndrome (RLS)          Plan:  Angelina is doing great on auto CPAP 5-15cm H2O. He gets at least 8 hours of sleep per night. Her RIVERA is well controlled. She does not experience any drowsiness while driving. I have no objection for CDL renewal with DOT with continued usage of CPAP. I have completed the appropriate statement for DOT. She does not take Trazodone anymore and is able to stay asleep well. Her RLS is controlled. I will send order to DME to renew CPAP supplies.      Patient uses the CPAP device and benefits from its use in terms of reduction of hypersomnia and snoring.  AHI appears to be within adequate range. I reviewed download report and original sleep study report with the patient.     Weight loss will be strongly beneficial to reduce the severity of sleep-disordered breathing.  Caution during activities that require prolonged concentration is  strongly advised if sleepiness returns.     Follow up with Dr. Ortiz in one year    Thank you for allowing me to participate in your patient's care.      WALT Smith  [place] [facility]  Phone: [phone]      Part of this note may be an electronic transcription/translation of spoken language to printed text using the Dragon Dictation System.

## 2023-04-14 RX ORDER — PRAMIPEXOLE DIHYDROCHLORIDE 0.5 MG/1
0.5 TABLET ORAL NIGHTLY
Qty: 90 TABLET | Refills: 1 | Status: SHIPPED | OUTPATIENT
Start: 2023-04-14

## 2023-08-18 ENCOUNTER — TELEPHONE (OUTPATIENT)
Dept: SLEEP MEDICINE | Facility: HOSPITAL | Age: 68
End: 2023-08-18
Payer: MEDICARE

## 2023-08-18 RX ORDER — PRAMIPEXOLE DIHYDROCHLORIDE 0.5 MG/1
0.5 TABLET ORAL NIGHTLY
Qty: 90 TABLET | Refills: 1 | Status: SHIPPED | OUTPATIENT
Start: 2023-08-18

## 2023-12-28 ENCOUNTER — OFFICE VISIT (OUTPATIENT)
Dept: BARIATRICS/WEIGHT MGMT | Facility: CLINIC | Age: 68
End: 2023-12-28
Payer: MEDICARE

## 2023-12-28 VITALS
HEIGHT: 65 IN | SYSTOLIC BLOOD PRESSURE: 157 MMHG | BODY MASS INDEX: 41.99 KG/M2 | HEART RATE: 77 BPM | DIASTOLIC BLOOD PRESSURE: 90 MMHG | TEMPERATURE: 97.3 F | WEIGHT: 252 LBS

## 2023-12-28 DIAGNOSIS — K21.9 GASTROESOPHAGEAL REFLUX DISEASE, UNSPECIFIED WHETHER ESOPHAGITIS PRESENT: ICD-10-CM

## 2023-12-28 DIAGNOSIS — E66.9 TYPE 2 DIABETES MELLITUS WITH OBESITY: Primary | ICD-10-CM

## 2023-12-28 DIAGNOSIS — G47.33 OSA (OBSTRUCTIVE SLEEP APNEA): ICD-10-CM

## 2023-12-28 DIAGNOSIS — M06.9 RHEUMATOID ARTHRITIS, INVOLVING UNSPECIFIED SITE, UNSPECIFIED WHETHER RHEUMATOID FACTOR PRESENT: ICD-10-CM

## 2023-12-28 DIAGNOSIS — R53.82 CHRONIC FATIGUE: ICD-10-CM

## 2023-12-28 DIAGNOSIS — E11.69 TYPE 2 DIABETES MELLITUS WITH OBESITY: Primary | ICD-10-CM

## 2023-12-28 DIAGNOSIS — E66.01 CLASS 3 SEVERE OBESITY WITH SERIOUS COMORBIDITY AND BODY MASS INDEX (BMI) OF 40.0 TO 44.9 IN ADULT, UNSPECIFIED OBESITY TYPE: ICD-10-CM

## 2023-12-28 PROBLEM — E66.813 CLASS 3 SEVERE OBESITY WITH SERIOUS COMORBIDITY AND BODY MASS INDEX (BMI) OF 40.0 TO 44.9 IN ADULT: Status: ACTIVE | Noted: 2021-05-14

## 2023-12-28 PROBLEM — Z87.11 HISTORY OF GASTRIC ULCER: Status: RESOLVED | Noted: 2017-02-21 | Resolved: 2023-12-28

## 2023-12-28 PROBLEM — E66.813 OBESITY, CLASS III, BMI 40-49.9 (MORBID OBESITY): Status: RESOLVED | Noted: 2021-05-14 | Resolved: 2023-12-28

## 2023-12-28 PROBLEM — T14.8XXA WOUND INFECTION: Status: RESOLVED | Noted: 2022-12-08 | Resolved: 2023-12-28

## 2023-12-28 PROBLEM — R15.9 ENCOPRESIS: Status: RESOLVED | Noted: 2017-09-06 | Resolved: 2023-12-28

## 2023-12-28 PROBLEM — R19.7 DIARRHEA OF PRESUMED INFECTIOUS ORIGIN: Status: RESOLVED | Noted: 2022-12-08 | Resolved: 2023-12-28

## 2023-12-28 PROBLEM — L08.9 WOUND INFECTION: Status: RESOLVED | Noted: 2022-12-08 | Resolved: 2023-12-28

## 2023-12-28 PROCEDURE — 99214 OFFICE O/P EST MOD 30 MIN: CPT | Performed by: NURSE PRACTITIONER

## 2023-12-28 PROCEDURE — 1160F RVW MEDS BY RX/DR IN RCRD: CPT | Performed by: NURSE PRACTITIONER

## 2023-12-28 PROCEDURE — 1159F MED LIST DOCD IN RCRD: CPT | Performed by: NURSE PRACTITIONER

## 2023-12-28 RX ORDER — METHOTREXATE 2.5 MG/1
2.5 TABLET ORAL
COMMUNITY
Start: 2023-11-21

## 2023-12-28 NOTE — PROGRESS NOTES
"[facility] BARIATRIC [facility] BARIATRIC SURGERY  [facility] [address]  [facility] [id number] [phone]  [address] [id number] [phone]  Dept: [phone]  12/28/2023      Angelina Narayan.  [id number]  [id number]  1955  female      Chief Complaint   Patient presents with    Follow-up     Follow up sleeve       BH Post-Op Bariatric Surgery:   Angelina Narayan is status post Laparoscopic Sleeve/HH procedure, performed on 6/12/2017     HPI:       Today's weight is 114 kg (252 lb) pounds, today's BMI is Body mass index is 41.93 kg/m²., she has a loss of 7 pounds since the last visit and her weight loss since surgery is 20 pounds. The patient reports a decreased portion size and loss of appetite.    Angelina Narayan denies nausea, vomiting, reflux and reports that she started tirzepatide injections weekly from a compound pharmacy in [place]. She has taken two doses at 0.5mg weekly and does feel that \"noise related to food\" has gone away. She has long 10lbs since starting.     Diet and Exercise: Diet history reviewed and discussed with the patient. Weight loss/gains to date discussed with the patient. The patient states they are eating 60-70 grams of protein per day. She reports eating 3 meals per day, a typical portion size of 1/2 cup, eating 1 snacks per day, drinking 5-6 or more 8-oz. glasses of water per day, no carbonated beverage consumption and exercising regularly.     Supplements: bariatric specific MTV with iron.     Review of Systems   Constitutional:  Positive for appetite change. Negative for fatigue and unexpected weight change.   HENT: Negative.     Eyes: Negative.    Respiratory: Negative.     Cardiovascular: Negative.  Negative for leg swelling.   Gastrointestinal:  Negative for abdominal distention, abdominal pain, constipation, diarrhea, nausea and vomiting.   Genitourinary:  Negative for difficulty urinating, frequency and urgency. "   Musculoskeletal:  Negative for back pain.   Skin: Negative.    Psychiatric/Behavioral: Negative.     All other systems reviewed and are negative.      Patient Active Problem List   Diagnosis    RIVERA (obstructive sleep apnea)    Type 2 diabetes mellitus with obesity    Chronic fatigue    Edema    Multiple joint pain    Depressive disorder    Hypothyroidism    Gastric polyp    Dietary counseling    Gastroesophageal reflux disease    Rheumatoid arthritis    Arthritis of knee    Class 3 severe obesity with serious comorbidity and body mass index (BMI) of 40.0 to 44.9 in adult    Cellulitis    Neuropathy       Past Medical History:   Diagnosis Date    Anemia     Cellulitis 06/2022    DDD (degenerative disc disease), lumbar     Diabetes mellitus     Disease of thyroid gland     HYPOTHYROID    Fatigue     GERD (gastroesophageal reflux disease)     Heartburn     Hiatal hernia     Hypertension     Joint pain     RIVERA (obstructive sleep apnea)     CPAP    RLS (restless legs syndrome)        The following portions of the patient's history were reviewed and updated as appropriate: allergies, current medications, past family history, past medical history, past social history, past surgical history, and problem list.    Vitals:    12/28/23 1407   BP: 157/90   Pulse: 77   Temp: 97.3 °F (36.3 °C)       Physical Exam  Vitals and nursing note reviewed.   Constitutional:       Appearance: She is well-developed.   Neck:      Thyroid: No thyromegaly.   Cardiovascular:      Rate and Rhythm: Normal rate.   Pulmonary:      Effort: Pulmonary effort is normal. No respiratory distress.   Abdominal:      Palpations: Abdomen is soft.   Musculoskeletal:         General: No tenderness.   Skin:     General: Skin is warm and dry.   Neurological:      Mental Status: She is alert.   Psychiatric:         Behavior: Behavior normal.         Assessment:   Post-op, the patient is doing well.     Encounter Diagnoses   Name Primary?    Class 3 severe  obesity with serious comorbidity and body mass index (BMI) of 40.0 to 44.9 in adult, unspecified obesity type Yes    Type 2 diabetes mellitus with obesity     Gastroesophageal reflux disease, unspecified whether esophagitis present     Rheumatoid arthritis, involving unspecified site, unspecified whether rheumatoid factor present     RIVERA (obstructive sleep apnea)     Chronic fatigue        Plan:   Will change to prescription mounjaro. Discussed dosing, MOA, autoinjector.   Encouraged patient to be sure to get plenty of lean protein per day through small frequent meals all with a protein source.   Activity restrictions: none.   Recommended patient be sure to get at least 70 grams of protein per day by eating small, frequent meals all with high lean protein choices. Be sure to limit/cut back on daily carbohydrate intake. Discussed with the patient the recommended amount of water per day to intake- half of body weight in ounces. Reviewed vitamin requirements. Be sure to do routine exercise, 150 minutes per week minimum, including both cardio and strength training.     Instructions / Recommendations: dietary counseling recommended, recommended a daily protein intake of  grams, vitamin supplement(s) recommended, recommended exercising at least 150 minutes per week, behavior modifications recommended and instructed to call the office for concerns, questions, or problems.     The patient was instructed to follow up in 3 months .   Total time spent during this encounter today was 25 minutes

## 2024-01-23 ENCOUNTER — TRANSCRIBE ORDERS (OUTPATIENT)
Dept: ULTRASOUND IMAGING | Facility: HOSPITAL | Age: 69
End: 2024-01-23
Payer: MEDICARE

## 2024-01-23 DIAGNOSIS — R22.42 LUMP OF SKIN OF LEFT LOWER EXTREMITY: Primary | ICD-10-CM

## 2024-02-21 ENCOUNTER — HOSPITAL ENCOUNTER (OUTPATIENT)
Dept: ULTRASOUND IMAGING | Facility: HOSPITAL | Age: 69
Discharge: HOME | End: 2024-02-21
Admitting: NURSE PRACTITIONER
Payer: MEDICARE

## 2024-02-21 DIAGNOSIS — R22.42 LUMP OF SKIN OF LEFT LOWER EXTREMITY: ICD-10-CM

## 2024-02-21 PROCEDURE — 76882 US LMTD JT/FCL EVL NVASC XTR: CPT

## 2024-02-27 ENCOUNTER — TELEPHONE (OUTPATIENT)
Dept: BARIATRICS/WEIGHT MGMT | Facility: CLINIC | Age: 69
End: 2024-02-27
Payer: MEDICARE

## 2024-02-27 NOTE — TELEPHONE ENCOUNTER
Called and spoke to patient. She was having trouble getting her Mounjaro 7.5mg filled. She said every time she tried getting the prescription filled she was getting an error message that said contact her provider. I called and spoke to medicare part DGalilea and she said that the auth is in there and good until 12/31/2024 and that the pharmacy just needed to re-run it and it should go through. Called patient back to let her know this and she is going to call the pharmacy and have them rerun the prescription and will call us back if she has any problems.

## 2024-03-04 ENCOUNTER — TELEPHONE (OUTPATIENT)
Dept: BARIATRICS/WEIGHT MGMT | Facility: CLINIC | Age: 69
End: 2024-03-04
Payer: MEDICARE

## 2024-03-04 NOTE — TELEPHONE ENCOUNTER
Patient is having trouble getting her Mounjaro filled 7.5mg filled with her pharmacy. She stated that [facility] is requesting a new prescription. Patient stated that she is doing really well with the 7.5mg. Can you let me know when you send it in so I can call patient and let her know so she can contact her pharmacy?

## 2024-03-18 ENCOUNTER — OFFICE VISIT (OUTPATIENT)
Dept: SURGERY | Facility: CLINIC | Age: 69
End: 2024-03-18
Payer: MEDICARE

## 2024-03-18 VITALS
WEIGHT: 224 LBS | DIASTOLIC BLOOD PRESSURE: 84 MMHG | HEART RATE: 72 BPM | BODY MASS INDEX: 37.32 KG/M2 | RESPIRATION RATE: 16 BRPM | SYSTOLIC BLOOD PRESSURE: 142 MMHG | HEIGHT: 65 IN

## 2024-03-18 DIAGNOSIS — I87.2 VENOUS STASIS DERMATITIS OF BOTH LOWER EXTREMITIES: Primary | ICD-10-CM

## 2024-03-18 DIAGNOSIS — L03.116 CELLULITIS OF LEFT LOWER EXTREMITY: ICD-10-CM

## 2024-03-18 PROCEDURE — 1160F RVW MEDS BY RX/DR IN RCRD: CPT | Performed by: SURGERY

## 2024-03-18 PROCEDURE — 99203 OFFICE O/P NEW LOW 30 MIN: CPT | Performed by: SURGERY

## 2024-03-18 PROCEDURE — 1159F MED LIST DOCD IN RCRD: CPT | Performed by: SURGERY

## 2024-03-18 RX ORDER — CIPROFLOXACIN 500 MG/1
500 TABLET, FILM COATED ORAL 2 TIMES DAILY
Qty: 20 TABLET | Refills: 0 | Status: SHIPPED | OUTPATIENT
Start: 2024-03-18

## 2024-03-18 NOTE — PROGRESS NOTES
Angelina Narayan 68 y.o. female presents @ the req of CHETNA Duncan for eval of 2 masses LLE.  Pt states she previously had psuedomonas in a wound at the same location.  Since making this appointment pt had a traumatic injury at the site of the mass.    Chief Complaint   Patient presents with    Mass     X2 LLE             HPI   Above noted and agree.  Angelina was originally scheduled to come here to evaluate 2 subcutaneous nodules of the left lower extremity.  Prior to her appointment she injured her shin.  She now has a lot of pain and swelling and an open wound.  She does have venous stasis disease of the lower extremities and was told she has lymphedema of her leg.  She has no fevers or chills.  She has no other complaints.      Review of Systems   All other systems reviewed and are negative.            Current Outpatient Medications:     ciprofloxacin (Cipro) 500 MG tablet, Take 1 tablet by mouth 2 (Two) Times a Day., Disp: 20 tablet, Rfl: 0    FOLIC ACID PO, Take  by mouth., Disp: , Rfl:     levothyroxine (SYNTHROID, LEVOTHROID) 75 MCG tablet, Take 75 mcg by mouth Daily., Disp: , Rfl:     methotrexate 2.5 MG tablet, Take 1 tablet by mouth 3 (Three) Doses Each Week. Take Doses 12 (Twelve) Hours Apart., Disp: , Rfl:     pramipexole (MIRAPEX) 0.5 MG tablet, Take 1 tablet by mouth Every Night., Disp: 90 tablet, Rfl: 1        Allergies   Allergen Reactions    Morphine And Related Itching    Sulfa Antibiotics Itching    Toradol [Ketorolac Tromethamine] Itching    Adhesive Tape Rash           Past Medical History:   Diagnosis Date    Anemia     Cellulitis 06/2022    DDD (degenerative disc disease), lumbar     Diabetes mellitus     Disease of thyroid gland     HYPOTHYROID    Fatigue     GERD (gastroesophageal reflux disease)     Heartburn     Hiatal hernia     Hypertension     Joint pain     RIVERA (obstructive sleep apnea)     CPAP    RLS (restless legs syndrome)            Past Surgical History:   Procedure Laterality Date  "    SECTION      COLONOSCOPY      DILATATION AND CURETTAGE      ENDOSCOPY      GASTRIC SLEEVE LAPAROSCOPIC N/A 2017    Procedure: GASTRIC SLEEVE LAPAROSCOPIC WITH HIATAL HERNIA REPIAR, LYSIS OF ADHESIONS;  Surgeon: Job Douglass Jr., MD;  [place] [facility] [place] [facility] [place]  Service:     HYSTERECTOMY      KNEE ARTHROSCOPY      LAPAROSCOPIC CHOLECYSTECTOMY      SKIN GRAFT      TONSILLECTOMY      TUBAL ABDOMINAL LIGATION             Social History     Tobacco Use    Smoking status: Never     Passive exposure: Never    Smokeless tobacco: Never    Tobacco comments:     daily caffine   Vaping Use    Vaping status: Never Used   Substance Use Topics    Alcohol use: No    Drug use: No           Immunization History   Administered Date(s) Administered    COVID-19 (MODERNA) 1st,2nd,3rd Dose Monovalent 2021, 2021           Physical Exam  Constitutional:       Appearance: Normal appearance.   Musculoskeletal:      Comments: Bilateral lower extremities have skin changes associated with venous stasis.  On the left shin is a wound that is red and swollen.  The left lower extremity is swollen as well.   Skin:     Comments: On the right shin is a circular red lesion   Neurological:      General: No focal deficit present.      Mental Status: She is alert and oriented to person, place, and time.   Psychiatric:         Mood and Affect: Mood normal.         Behavior: Behavior normal.         Debilities/Disabilities Identified: None    Emotional Behavior: Appropriate      /84   Pulse 72   Resp 16   Ht 165.1 cm (65\")   Wt 102 kg (224 lb)   BMI 37.28 kg/m²         Diagnoses and all orders for this visit:    1. Venous stasis dermatitis of both lower extremities (Primary)    2. Cellulitis of left lower extremity    Other orders  -     ciprofloxacin (Cipro) 500 MG tablet; Take 1 tablet by mouth 2 (Two) Times a Day.  Dispense: 20 tablet; Refill: 0    We will place an Unna boot to the left lower extremity.  Will " bring Angelina back next week.    Thank you for allowing me to participate in the care of this interesting patient.

## 2024-03-18 NOTE — LETTER
March 18, 2024       No Recipients    Patient: Angelina Narayan   YOB: 1955   Date of Visit: 3/18/2024     Dear CHETNA Palmer:       Thank you for referring Angelina Narayan to me for evaluation. Below are the relevant portions of my assessment and plan of care.    If you have questions, please do not hesitate to call me. I look forward to following Angelina along with you.         Sincerely,        Faiza Fishman DO        CC:   No Recipients    Faiza Fishman DO  03/18/24 0955  Sign when Signing Visit  Angelina Narayan 68 y.o. female presents @ the req of CHETNA Duncan for eval of 2 masses LLE.  Pt states she previously had psuedomonas in a wound at the same location.  Since making this appointment pt had a traumatic injury at the site of the mass.    Chief Complaint   Patient presents with   • Mass     X2 LLE             HPI   Above noted and agree.  Angelina was originally scheduled to come here to evaluate 2 subcutaneous nodules of the left lower extremity.  Prior to her appointment she injured her shin.  She now has a lot of pain and swelling and an open wound.  She does have venous stasis disease of the lower extremities and was told she has lymphedema of her leg.  She has no fevers or chills.  She has no other complaints.      Review of Systems   All other systems reviewed and are negative.            Current Outpatient Medications:   •  ciprofloxacin (Cipro) 500 MG tablet, Take 1 tablet by mouth 2 (Two) Times a Day., Disp: 20 tablet, Rfl: 0  •  FOLIC ACID PO, Take  by mouth., Disp: , Rfl:   •  levothyroxine (SYNTHROID, LEVOTHROID) 75 MCG tablet, Take 75 mcg by mouth Daily., Disp: , Rfl:   •  methotrexate 2.5 MG tablet, Take 1 tablet by mouth 3 (Three) Doses Each Week. Take Doses 12 (Twelve) Hours Apart., Disp: , Rfl:   •  pramipexole (MIRAPEX) 0.5 MG tablet, Take 1 tablet by mouth Every Night., Disp: 90 tablet, Rfl: 1        Allergies   Allergen Reactions   • Morphine And Related Itching   • Sulfa  Antibiotics Itching   • Toradol [Ketorolac Tromethamine] Itching   • Adhesive Tape Rash           Past Medical History:   Diagnosis Date   • Anemia    • Cellulitis 2022   • DDD (degenerative disc disease), lumbar    • Diabetes mellitus    • Disease of thyroid gland     HYPOTHYROID   • Fatigue    • GERD (gastroesophageal reflux disease)    • Heartburn    • Hiatal hernia    • Hypertension    • Joint pain    • RIVERA (obstructive sleep apnea)     CPAP   • RLS (restless legs syndrome)            Past Surgical History:   Procedure Laterality Date   •  SECTION     • COLONOSCOPY     • DILATATION AND CURETTAGE     • ENDOSCOPY     • GASTRIC SLEEVE LAPAROSCOPIC N/A 2017    Procedure: GASTRIC SLEEVE LAPAROSCOPIC WITH HIATAL HERNIA REPIAR, LYSIS OF ADHESIONS;  Surgeon: Job Douglass Jr., MD;  [place] [facility] [place] [facility] [place]  Service:    • HYSTERECTOMY     • KNEE ARTHROSCOPY     • LAPAROSCOPIC CHOLECYSTECTOMY     • SKIN GRAFT     • TONSILLECTOMY     • TUBAL ABDOMINAL LIGATION             Social History     Tobacco Use   • Smoking status: Never     Passive exposure: Never   • Smokeless tobacco: Never   • Tobacco comments:     daily caffine   Vaping Use   • Vaping status: Never Used   Substance Use Topics   • Alcohol use: No   • Drug use: No           Immunization History   Administered Date(s) Administered   • COVID-19 (MODERNA) 1st,2nd,3rd Dose Monovalent 2021, 2021           Physical Exam  Constitutional:       Appearance: Normal appearance.   Musculoskeletal:      Comments: Bilateral lower extremities have skin changes associated with venous stasis.  On the left shin is a wound that is red and swollen.  The left lower extremity is swollen as well.   Skin:     Comments: On the right shin is a circular red lesion   Neurological:      General: No focal deficit present.      Mental Status: She is alert and oriented to person, place, and time.   Psychiatric:         Mood and Affect: Mood normal.          "Behavior: Behavior normal.         Debilities/Disabilities Identified: None    Emotional Behavior: Appropriate      /84   Pulse 72   Resp 16   Ht 165.1 cm (65\")   Wt 102 kg (224 lb)   BMI 37.28 kg/m²         Diagnoses and all orders for this visit:    1. Venous stasis dermatitis of both lower extremities (Primary)    2. Cellulitis of left lower extremity    Other orders  -     ciprofloxacin (Cipro) 500 MG tablet; Take 1 tablet by mouth 2 (Two) Times a Day.  Dispense: 20 tablet; Refill: 0    We will place an Unna boot to the left lower extremity.  Will bring Angelina back next week.    Thank you for allowing me to participate in the care of this interesting patient.         "

## 2024-03-20 ENCOUNTER — OFFICE VISIT (OUTPATIENT)
Dept: SLEEP MEDICINE | Facility: HOSPITAL | Age: 69
End: 2024-03-20
Payer: MEDICARE

## 2024-03-20 VITALS
BODY MASS INDEX: 36.99 KG/M2 | WEIGHT: 222 LBS | HEIGHT: 65 IN | OXYGEN SATURATION: 98 % | DIASTOLIC BLOOD PRESSURE: 77 MMHG | HEART RATE: 76 BPM | SYSTOLIC BLOOD PRESSURE: 131 MMHG

## 2024-03-20 DIAGNOSIS — Z02.4 ENCOUNTER FOR CDL (COMMERCIAL DRIVING LICENSE) EXAM: ICD-10-CM

## 2024-03-20 DIAGNOSIS — F51.04 PSYCHOPHYSIOLOGICAL INSOMNIA: ICD-10-CM

## 2024-03-20 DIAGNOSIS — G47.33 OBSTRUCTIVE SLEEP APNEA: Primary | ICD-10-CM

## 2024-03-20 DIAGNOSIS — G25.81 RESTLESS LEGS SYNDROME (RLS): ICD-10-CM

## 2024-03-20 PROCEDURE — G0463 HOSPITAL OUTPT CLINIC VISIT: HCPCS

## 2024-03-20 RX ORDER — PRAMIPEXOLE DIHYDROCHLORIDE 0.5 MG/1
0.5 TABLET ORAL NIGHTLY
Qty: 90 TABLET | Refills: 1 | Status: SHIPPED | OUTPATIENT
Start: 2024-03-20

## 2024-03-20 NOTE — PROGRESS NOTES
[facility]  Telephone: [phone] / Fax: [phone] [place]  Telephone: [phone] / Fax: [phone] Dang Perdomo    PCP: Lesly Anthony, NP-C    Reason for visit: RIVERA f/u    Angelina Narayan is a 68 y.o.female  was last seen at [facility] sleep lab 1 year ago.   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 She gets enough sleep in general and wakes up feeling rested. Machine controls the snoring and apneas. Pressures are set at 5-15cm H2O.  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 Her ESS is 4 today.    She switched masks as she is mostly a side sleeper and now has a nasal mask. it does not fit well every night. She has size small headgear but it feels too large. She  needs to have mask fit by DME.    She is on Pramipexole for RLS 0.5mg for RLS which helps a lot.    SH-no tobacco, 2-3 caffeine per day.    ROS- negative.      Current Medications:    Current Outpatient Medications:     ciprofloxacin (Cipro) 500 MG tablet, Take 1 tablet by mouth 2 (Two) Times a Day., Disp: 20 tablet, Rfl: 0    FOLIC ACID PO, Take  by mouth., Disp: , Rfl:     levothyroxine (SYNTHROID, LEVOTHROID) 75 MCG tablet, Take 75 mcg by mouth Daily., Disp: , Rfl:     methotrexate 2.5 MG tablet, Take 1 tablet by mouth 3 (Three) Doses Each Week. Take Doses 12 (Twelve) Hours Apart., Disp: , Rfl:     pramipexole (MIRAPEX) 0.5 MG tablet, Take 1 tablet by mouth Every Night., Disp: 90 tablet, Rfl: 1   also entered in Sleep Questionnaire    Patient  has a past medical history of Anemia, Cellulitis (06/2022), DDD (degenerative disc disease), lumbar, Diabetes mellitus, Disease of thyroid gland, Fatigue, GERD (gastroesophageal reflux disease), Heartburn, Hiatal hernia, Hypertension, Joint pain, RIVERA (obstructive sleep apnea), and RLS (restless legs syndrome).    I have reviewed the Past Medical History, Past Surgical History, Social History and Family History.    Vital Signs /77   Pulse 76    "Ht 165.1 cm (65\")   Wt 101 kg (222 lb)   SpO2 98%   BMI 36.94 kg/m²  Body mass index is 36.94 kg/m².    General Alert and oriented. No acute distress noted   Pharynx/Throat Class IV  Mallampati airway, large tongue, no evidence of redundant lateral pharyngeal tissue. No oral lesions. No thrush. Moist mucous membranes.   Head Normocephalic. Symmetrical. Atraumatic.    Nose No septal deviation. No drainage   Chest Wall Normal shape. Symmetric expansion with respiration. No tenderness.   Neck Trachea midline, no thyromegaly or adenopathy    Lungs Clear to auscultation bilaterally. No wheezes. No rhonchi. No rales. Respirations regular, even and unlabored.   Heart Regular rhythm and normal rate. Normal S1 and S2. No murmur   Abdomen Soft, non-tender and non-distended. Normal bowel sounds. No masses.   Extremities Moves all extremities well. No edema   Psychiatric Normal mood and affect.     Testing:  Download 12/20/23-3/18/24 100% use with average nightly use of 6 hours and 29 minutes on auto CPAP 5-15cm H2O, avg pressure is 11.8cm H2O, AHI 2.6/hr, leak is 6.8 L.min.    Study-5/4/18-  Diagnostic findings: The patient tolerated the home sleep testing with monitoring time of 415 minutes. The data obtained make this a technically adequate study. The apnea hypopneas index(AHI) was 24.5 per sleep hour.  The AHI during supine position was 26.8 per sleep hour. Mean heart rate of 60.3 BPM.  Snoring was noted 48.9% of sleep time. Lowest oxygen saturation during the study was 72%. Saturation below 89% was noted for 109 mins.      Titration 5/18/18-Overnight titration study from 9:21 PM to 4:06 AM.  Sleep efficiency 96%.  Normal sleep distribution.  AHI 0.3.  Adequate supine sleep and REM sleep.  Oxygen saturation remained above 88%.  No snoring.  CPAP increased from 5-9 cm water pressure.  Maximum sleep at 6 cm water pressure with maximum REM sleep at 8 cm water pressure.  Supine sleep only on the higher pressure settings. " Study shows excellent sleep efficiency and normal sleep distribution with CPAP device.  Appropriate pressures 8 cm water pressure.  Home CPAP should be set to the same.    Impression:  1. Obstructive sleep apnea    2. Encounter for CDL (commercial driving license) exam    3. Psychophysiological insomnia    4. Restless legs syndrome (RLS)          Plan:  I ordered DME to see patient for mask fitting.  Pt would like to try nasal pillows. We discussed the importance of using the machine every night as she has been doing and replacing the mask cushions frequently to help reduce leaks.     DOT statement was completed today and provided to the patient. No objection for CDL renewal from our standpoint with continued compliance with CPAP device.    Her RLS is well controlled. She does not report any compulsive behaviors. Pramipexole refills provided.    She does report occasional insomnia and can try Melatonin. Sleep hygiene measures reviewed. Benefits of exercise reviewed.    Patient uses the CPAP device and benefits from its use in terms of reduction of hypersomnia and snoring.  AHI appears to be within adequate range.     I reviewed download report and original sleep study report with the patient. I advised pt to contact us if PAP pressures feel excessive or insufficient.    Weight loss will be strongly beneficial to reduce the severity of sleep-disordered breathing.      Caution during activities that require prolonged concentration is strongly advised if sleepiness returns.    I updated the annual prescription for CPAP supplies.    Follow up with Dr. Ortiz in one year    Thank you for allowing me to participate in your patient's care.      WALT Smith  [facility]  Phone: [phone]      Part of this note may be an electronic transcription/translation of spoken language to printed text using the Dragon Dictation System.

## 2024-03-22 ENCOUNTER — PATIENT ROUNDING (BHMG ONLY) (OUTPATIENT)
Dept: SURGERY | Facility: CLINIC | Age: 69
End: 2024-03-22
Payer: MEDICARE

## 2024-03-22 NOTE — PROGRESS NOTES
March 22, 2024        I am calling to officially welcome you to our practice and ask about your recent visit. Is this a good time to talk? No. Left voicemail to call back

## 2024-03-25 ENCOUNTER — OFFICE VISIT (OUTPATIENT)
Dept: SURGERY | Facility: CLINIC | Age: 69
End: 2024-03-25
Payer: MEDICARE

## 2024-03-25 DIAGNOSIS — I87.8 VENOUS STASIS: Primary | ICD-10-CM

## 2024-03-25 DIAGNOSIS — L98.8 DYSPLASTIC SKIN LESION: ICD-10-CM

## 2024-03-25 PROCEDURE — 11104 PUNCH BX SKIN SINGLE LESION: CPT | Performed by: SURGERY

## 2024-03-25 PROCEDURE — 1160F RVW MEDS BY RX/DR IN RCRD: CPT | Performed by: SURGERY

## 2024-03-25 PROCEDURE — 1159F MED LIST DOCD IN RCRD: CPT | Performed by: SURGERY

## 2024-03-25 NOTE — PROGRESS NOTES
Angelina Narayan 68 y.o. female presents for  FU Unna Boot LLE and punch bx RLE.       HPI   Above noted and agree.  Angelina is doing well.  She believes her swelling has improved.  She did have pain for 2 days after the Unna boot was applied but that has resolved.      Review of Systems        Past Medical History:   Diagnosis Date    Anemia     Cellulitis 2022    DDD (degenerative disc disease), lumbar     Diabetes mellitus     Disease of thyroid gland     HYPOTHYROID    Fatigue     GERD (gastroesophageal reflux disease)     Heartburn     Hiatal hernia     Hypertension     Joint pain     RIVERA (obstructive sleep apnea)     CPAP    RLS (restless legs syndrome)            Past Surgical History:   Procedure Laterality Date     SECTION      COLONOSCOPY      DILATATION AND CURETTAGE      ENDOSCOPY      GASTRIC SLEEVE LAPAROSCOPIC N/A 2017    Procedure: GASTRIC SLEEVE LAPAROSCOPIC WITH HIATAL HERNIA REPIAR, LYSIS OF ADHESIONS;  Surgeon: Job Douglass Jr., MD;  [place] [facility] [place] [facility] [place]  Service:     HYSTERECTOMY      KNEE ARTHROSCOPY      LAPAROSCOPIC CHOLECYSTECTOMY      SKIN GRAFT      TONSILLECTOMY      TUBAL ABDOMINAL LIGATION             Physical Exam  Constitutional:       Appearance: Normal appearance.   Musculoskeletal:      Comments: The swelling of the left lower extremity has significantly improved.  The wound is looking better as well.  Unna boot was reapplied.   Skin:     Comments: On the right lower extremity below the knee on the shin was a chronic wound.  The area was prepped and draped in the use sterile fashion.  Local was injected.  Using a punch biopsy a 6 mm punch biopsy was obtained.  Due to the thin skin on the shin we were unable to reapproximate the wound so we cauterized it with silver nitrate and covered with sterile dressing.  It will heal by secondary intention.   Neurological:      General: No focal deficit present.      Mental Status: She is alert and oriented to person, place,  and time.   Psychiatric:         Mood and Affect: Mood normal.         Behavior: Behavior normal.             There were no vitals taken for this visit.        Diagnoses and all orders for this visit:    1. Venous stasis (Primary)    2. Dysplastic skin lesion    We will review the pathology with Angelina next week and probably reapply the Unna boot on the left lower extremity.    Thank you for allowing me to participate in the care of this interesting patient.

## 2024-03-25 NOTE — LETTER
2024       No Recipients    Patient: Angelina Narayan   YOB: 1955   Date of Visit: 3/25/2024     Dear CHETNA Palmer:       Thank you for referring Angelina Narayan to me for evaluation. Below are the relevant portions of my assessment and plan of care.    If you have questions, please do not hesitate to call me. I look forward to following Angelina along with you.         Sincerely,        Faiza Fishman DO        CC:   No Recipients    Faiza Fishman DO  24 [phone]  Sign when Signing Visit  Angelina Narayan 68 y.o. female presents for  FU Unna Boot LLE and punch bx RLE.       HPI   Above noted and agree.  Angelina is doing well.  She believes her swelling has improved.  She did have pain for 2 days after the Unna boot was applied but that has resolved.      Review of Systems        Past Medical History:   Diagnosis Date   • Anemia    • Cellulitis 2022   • DDD (degenerative disc disease), lumbar    • Diabetes mellitus    • Disease of thyroid gland     HYPOTHYROID   • Fatigue    • GERD (gastroesophageal reflux disease)    • Heartburn    • Hiatal hernia    • Hypertension    • Joint pain    • RIVERA (obstructive sleep apnea)     CPAP   • RLS (restless legs syndrome)            Past Surgical History:   Procedure Laterality Date   •  SECTION     • COLONOSCOPY     • DILATATION AND CURETTAGE     • ENDOSCOPY     • GASTRIC SLEEVE LAPAROSCOPIC N/A 2017    Procedure: GASTRIC SLEEVE LAPAROSCOPIC WITH HIATAL HERNIA REPIAR, LYSIS OF ADHESIONS;  Surgeon: Job Douglass Jr., MD;  Location: [facility] OR [place];  Service:    • HYSTERECTOMY     • KNEE ARTHROSCOPY     • LAPAROSCOPIC CHOLECYSTECTOMY     • SKIN GRAFT     • TONSILLECTOMY     • TUBAL ABDOMINAL LIGATION             Physical Exam  Constitutional:       Appearance: Normal appearance.   Musculoskeletal:      Comments: The swelling of the left lower extremity has significantly improved.  The wound is looking better as well.  Unna boot was  reapplied.   Skin:     Comments: On the right lower extremity below the knee on the shin was a chronic wound.  The area was prepped and draped in the use sterile fashion.  Local was injected.  Using a punch biopsy a 6 mm punch biopsy was obtained.  Due to the thin skin on the shin we were unable to reapproximate the wound so we cauterized it with silver nitrate and covered with sterile dressing.  It will heal by secondary intention.   Neurological:      General: No focal deficit present.      Mental Status: She is alert and oriented to person, place, and time.   Psychiatric:         Mood and Affect: Mood normal.         Behavior: Behavior normal.             There were no vitals taken for this visit.        Diagnoses and all orders for this visit:    1. Venous stasis (Primary)    2. Dysplastic skin lesion    We will review the pathology with Angelina next week and probably reapply the Unna boot on the left lower extremity.    Thank you for allowing me to participate in the care of this interesting patient.

## 2024-04-01 ENCOUNTER — CLINICAL SUPPORT (OUTPATIENT)
Dept: SURGERY | Facility: CLINIC | Age: 69
End: 2024-04-01
Payer: MEDICARE

## 2024-04-01 LAB
DX ICD CODE: NORMAL
DX ICD CODE: NORMAL
PATH REPORT.FINAL DX SPEC: NORMAL
PATH REPORT.GROSS SPEC: NORMAL
PATH REPORT.MICROSCOPIC SPEC OTHER STN: NORMAL
PATH REPORT.SITE OF ORIGIN SPEC: NORMAL
PATHOLOGIST NAME: NORMAL
PAYMENT PROCEDURE: NORMAL

## 2024-04-01 NOTE — PROGRESS NOTES
Pt presents for Unna Boot change LLE.  Pt cut bottom part of bandage off stating she has a cyst on her foot.  Pt is req to leave Unna Boot off so she can shower.  Unna Boot removed and wound shows much improvement.  Pt will return next week for FU with Dr. Fishman/wilma Unna Boot replacement.    Pt req eval RLE stating she hit her leg on something in her basement.  There is significant injury to RLE.  + erythema  + bruising + swelling + blood filled blister + significant pain.  Large part of RLE is warm to touch.  + pedal pulse.  Explained to pt that this injury needs eval via immediate care with wilma COLUNGA.  Pt is agreeable and is going to UC now.

## 2024-04-08 ENCOUNTER — OFFICE VISIT (OUTPATIENT)
Dept: SURGERY | Facility: CLINIC | Age: 69
End: 2024-04-08
Payer: MEDICARE

## 2024-04-08 DIAGNOSIS — L98.9 LESION OF SUBCUTANEOUS TISSUE: Primary | ICD-10-CM

## 2024-04-08 PROCEDURE — 11402 EXC TR-EXT B9+MARG 1.1-2 CM: CPT | Performed by: SURGERY

## 2024-04-08 PROCEDURE — 1159F MED LIST DOCD IN RCRD: CPT | Performed by: SURGERY

## 2024-04-08 PROCEDURE — 1160F RVW MEDS BY RX/DR IN RCRD: CPT | Performed by: SURGERY

## 2024-04-08 NOTE — LETTER
2024       No Recipients    Patient: Angelina Narayan   YOB: 1955   Date of Visit: 2024     Dear CHETNA Palmer:       Thank you for referring Angelina Narayan to me for evaluation. Below are the relevant portions of my assessment and plan of care.    If you have questions, please do not hesitate to call me. I look forward to following Angelina along with you.         Sincerely,        Faiza Fishman DO        CC:   No Recipients    Faiza Fishman DO  24 [phone]  Sign when Signing Visit  Angelina Narayan 68 y.o. female presents for  FU LLE.        HPI   Above noted and agree.  Angelina's swelling in the left leg has improved.  She can now feel the solid cyst on the tibia and would like it removed.  She had a fall and developed a hematoma on the right lower extremity.  It is slowly resolving.        Review of Systems        Past Medical History:   Diagnosis Date   • Anemia    • Cellulitis 2022   • DDD (degenerative disc disease), lumbar    • Diabetes mellitus    • Disease of thyroid gland     HYPOTHYROID   • Fatigue    • GERD (gastroesophageal reflux disease)    • Heartburn    • Hiatal hernia    • Hypertension    • Joint pain    • RIVERA (obstructive sleep apnea)     CPAP   • RLS (restless legs syndrome)            Past Surgical History:   Procedure Laterality Date   •  SECTION     • COLONOSCOPY     • DILATATION AND CURETTAGE     • ENDOSCOPY     • GASTRIC SLEEVE LAPAROSCOPIC N/A 2017    Procedure: GASTRIC SLEEVE LAPAROSCOPIC WITH HIATAL HERNIA REPIAR, LYSIS OF ADHESIONS;  Surgeon: Job Douglass Jr., MD;  Location: [facility] OR [facility] [place] [facility];  Service:    • HYSTERECTOMY     • KNEE ARTHROSCOPY     • LAPAROSCOPIC CHOLECYSTECTOMY     • SKIN GRAFT     • TONSILLECTOMY     • TUBAL ABDOMINAL LIGATION             Physical Exam    I discussed with the patient the benefits and risks of excising this lesion.   Risks not limited to but including bleeding, infection, having to excise more if the lesion  turns out to be cancer.  The patient appeared to understand and signed informed consent.  The area was prepped and draped in the usual sterile fashion.  Local was injected into the area to be excised.  The lesion was then excised.  Hemostasis was perfected.  The wound was then closed using simple sutures.  Sterile dressings were applied.  The patient tolerated the procedure well without complication.  Wound care instructions were then given to the patient.  The lesion was a cyst on the left leg below the knee measurin cm x 2 cm x 2 cm.      There were no vitals taken for this visit.        Diagnoses and all orders for this visit:    1. Lesion of subcutaneous tissue (Primary)    We will remove the sutures next week.    Thank you for allowing me to participate in the care of this interesting patient.

## 2024-04-08 NOTE — PROGRESS NOTES
Angelina Narayan 68 y.o. female presents for  FU LLE.        HPI   Above noted and agree.  Angelina's swelling in the left leg has improved.  She can now feel the solid cyst on the tibia and would like it removed.  She had a fall and developed a hematoma on the right lower extremity.  It is slowly resolving.        Review of Systems        Past Medical History:   Diagnosis Date    Anemia     Cellulitis 2022    DDD (degenerative disc disease), lumbar     Diabetes mellitus     Disease of thyroid gland     HYPOTHYROID    Fatigue     GERD (gastroesophageal reflux disease)     Heartburn     Hiatal hernia     Hypertension     Joint pain     RIVERA (obstructive sleep apnea)     CPAP    RLS (restless legs syndrome)            Past Surgical History:   Procedure Laterality Date     SECTION      COLONOSCOPY      DILATATION AND CURETTAGE      ENDOSCOPY      GASTRIC SLEEVE LAPAROSCOPIC N/A 2017    Procedure: GASTRIC SLEEVE LAPAROSCOPIC WITH HIATAL HERNIA REPIAR, LYSIS OF ADHESIONS;  Surgeon: Job Douglass Jr., MD;  Location: [facility] OR [facility];  Service:     HYSTERECTOMY      KNEE ARTHROSCOPY      LAPAROSCOPIC CHOLECYSTECTOMY      SKIN GRAFT      TONSILLECTOMY      TUBAL ABDOMINAL LIGATION             Physical Exam    I discussed with the patient the benefits and risks of excising this lesion.   Risks not limited to but including bleeding, infection, having to excise more if the lesion turns out to be cancer.  The patient appeared to understand and signed informed consent.  The area was prepped and draped in the usual sterile fashion.  Local was injected into the area to be excised.  The lesion was then excised.  Hemostasis was perfected.  The wound was then closed using simple sutures.  Sterile dressings were applied.  The patient tolerated the procedure well without complication.  Wound care instructions were then given to the patient.  The lesion was a cyst on the left leg below the knee measurin cm x 2 cm x 2 cm.       There were no vitals taken for this visit.        Diagnoses and all orders for this visit:    1. Lesion of subcutaneous tissue (Primary)    We will remove the sutures next week.    Thank you for allowing me to participate in the care of this interesting patient.

## 2024-04-10 ENCOUNTER — OFFICE VISIT (OUTPATIENT)
Dept: BARIATRICS/WEIGHT MGMT | Facility: CLINIC | Age: 69
End: 2024-04-10
Payer: MEDICARE

## 2024-04-10 ENCOUNTER — TELEPHONE (OUTPATIENT)
Dept: BARIATRICS/WEIGHT MGMT | Facility: CLINIC | Age: 69
End: 2024-04-10
Payer: COMMERCIAL

## 2024-04-10 VITALS
HEART RATE: 73 BPM | DIASTOLIC BLOOD PRESSURE: 87 MMHG | HEIGHT: 65 IN | BODY MASS INDEX: 36.99 KG/M2 | TEMPERATURE: 98 F | WEIGHT: 222 LBS | SYSTOLIC BLOOD PRESSURE: 144 MMHG

## 2024-04-10 DIAGNOSIS — G47.33 OSA (OBSTRUCTIVE SLEEP APNEA): ICD-10-CM

## 2024-04-10 DIAGNOSIS — E66.9 OBESITY, CLASS II, BMI 35-39.9: Primary | ICD-10-CM

## 2024-04-10 DIAGNOSIS — K21.9 GASTROESOPHAGEAL REFLUX DISEASE, UNSPECIFIED WHETHER ESOPHAGITIS PRESENT: ICD-10-CM

## 2024-04-10 DIAGNOSIS — E11.69 TYPE 2 DIABETES MELLITUS WITH OBESITY: ICD-10-CM

## 2024-04-10 DIAGNOSIS — E66.9 TYPE 2 DIABETES MELLITUS WITH OBESITY: ICD-10-CM

## 2024-04-10 PROBLEM — E66.813 CLASS 3 SEVERE OBESITY WITH SERIOUS COMORBIDITY AND BODY MASS INDEX (BMI) OF 40.0 TO 44.9 IN ADULT: Status: RESOLVED | Noted: 2021-05-14 | Resolved: 2024-04-10

## 2024-04-10 PROBLEM — E66.812 OBESITY, CLASS II, BMI 35-39.9: Status: ACTIVE | Noted: 2024-04-10

## 2024-04-10 PROBLEM — E11.9 DIABETES: Status: ACTIVE | Noted: 2024-04-10

## 2024-04-10 PROBLEM — E66.01 CLASS 3 SEVERE OBESITY WITH SERIOUS COMORBIDITY AND BODY MASS INDEX (BMI) OF 40.0 TO 44.9 IN ADULT: Status: RESOLVED | Noted: 2021-05-14 | Resolved: 2024-04-10

## 2024-04-10 LAB
DX ICD CODE: NORMAL
DX ICD CODE: NORMAL
PATH REPORT.FINAL DX SPEC: NORMAL
PATH REPORT.GROSS SPEC: NORMAL
PATH REPORT.SITE OF ORIGIN SPEC: NORMAL
PATHOLOGIST NAME: NORMAL
PAYMENT PROCEDURE: NORMAL

## 2024-04-10 PROCEDURE — 99213 OFFICE O/P EST LOW 20 MIN: CPT | Performed by: NURSE PRACTITIONER

## 2024-04-10 PROCEDURE — 1159F MED LIST DOCD IN RCRD: CPT | Performed by: NURSE PRACTITIONER

## 2024-04-10 PROCEDURE — 1160F RVW MEDS BY RX/DR IN RCRD: CPT | Performed by: NURSE PRACTITIONER

## 2024-04-10 RX ORDER — BUPROPION HYDROCHLORIDE 300 MG/1
300 TABLET ORAL EVERY MORNING
COMMUNITY
Start: 2024-01-16

## 2024-04-10 RX ORDER — FOLIC ACID 1 MG/1
1000 TABLET ORAL DAILY
COMMUNITY

## 2024-04-10 RX ORDER — CYANOCOBALAMIN 500 UG/1
500 SPRAY, METERED NASAL WEEKLY
COMMUNITY

## 2024-04-10 RX ORDER — HYDROXYCHLOROQUINE SULFATE 200 MG/1
1 TABLET, FILM COATED ORAL 2 TIMES DAILY
COMMUNITY

## 2024-04-10 NOTE — TELEPHONE ENCOUNTER
Patients secondary insurance [employer] supplement verified inactive. Eugenia called and the automated system verified patients plan as active ID [id number], Plan N eff 01/01/2023

## 2024-04-10 NOTE — PROGRESS NOTES
[facility]RIC SURGERY  [address]  [facility] [id number] [phone]  [address]  [facility] [id number] [phone]  Dept: [phone]  4/10/2024      Angelina Narayan.  [id number]  [id number]  1955  female      Chief Complaint   Patient presents with    Follow-up     Follow up sleeve       BH Post-Op Bariatric Surgery:   Angelina Narayan is status post Laparoscopic Sleeve/HH procedure, performed on 6/12/2017. We did try to get her started on name brand Mounjaro last month but due the medication being on backorder she has continued taking the compounded version of the drug.    HPI:     Today's weight is 101 kg (222 lb) pounds, today's BMI is Body mass index is 36.94 kg/m²., she has a loss of 30 pounds since the last visit and her weight loss since surgery is 50 pounds. The patient reports a decreased portion size and loss of appetite.    Angelina Narayan denies nausea, vomiting, reflux and reports constipation. She does take a daily fiber supplement. She does take mirilax intermittently but continues to struggle with constipation     Diet and Exercise: Diet history reviewed and discussed with the patient. Weight loss/gains to date discussed with the patient. The patient states they are eating 60 grams of protein per day. She reports eating 3 meals per day, a typical portion size of 1/2 cup, eating 1 snacks per day, drinking 5-6 or more 8-oz. glasses of water per day, no carbonated beverage consumption and exercising regularly.     Protein is coming from cottage cheese, chicken, pork, has protein coffee every mornings.     Supplements: OTC MTV with iron and calcium.     Review of Systems   Constitutional:  Positive for appetite change. Negative for fatigue and unexpected weight change.   HENT: Negative.     Eyes: Negative.    Respiratory: Negative.     Cardiovascular: Negative.  Negative for leg swelling.   Gastrointestinal:  Negative  for abdominal distention, abdominal pain, constipation, diarrhea, nausea and vomiting.   Genitourinary:  Negative for difficulty urinating, frequency and urgency.   Musculoskeletal:  Negative for back pain.   Skin: Negative.    Psychiatric/Behavioral: Negative.     All other systems reviewed and are negative.    Patient Active Problem List   Diagnosis    RIVERA (obstructive sleep apnea)    Type 2 diabetes mellitus with obesity    Chronic fatigue    Edema    Multiple joint pain    Depressive disorder    Hypothyroidism    Gastric polyp    Dietary counseling    Gastroesophageal reflux disease    Rheumatoid arthritis    Arthritis of knee    Cellulitis    Neuropathy    Diabetes    Obesity, Class II, BMI 35-39.9       Past Medical History:   Diagnosis Date    Anemia     Cellulitis 06/2022    DDD (degenerative disc disease), lumbar     Diabetes mellitus     Disease of thyroid gland     HYPOTHYROID    Fatigue     GERD (gastroesophageal reflux disease)     Heartburn     Hiatal hernia     Hypertension     Joint pain     RIVERA (obstructive sleep apnea)     CPAP    RLS (restless legs syndrome)        The following portions of the patient's history were reviewed and updated as appropriate: allergies, current medications, past family history, past medical history, past social history, past surgical history, and problem list.    Vitals:    04/10/24 1033   BP: 144/87   Pulse: 73   Temp: 98 °F (36.7 °C)       Physical Exam  Vitals and nursing note reviewed.   Constitutional:       Appearance: She is well-developed.   Neck:      Thyroid: No thyromegaly.   Cardiovascular:      Rate and Rhythm: Normal rate.   Pulmonary:      Effort: Pulmonary effort is normal. No respiratory distress.   Abdominal:      Palpations: Abdomen is soft.   Musculoskeletal:         General: No tenderness.   Skin:     General: Skin is warm and dry.   Neurological:      Mental Status: She is alert.   Psychiatric:         Behavior: Behavior normal.     Assessment:    Post-op, the patient is doing well.     Encounter Diagnoses   Name Primary?    Obesity, Class II, BMI 35-39.9 Yes    Type 2 diabetes mellitus with obesity     Gastroesophageal reflux disease, unspecified whether esophagitis present     RIVERA (obstructive sleep apnea)        Plan:   Encouraged patient to be sure to get plenty of lean protein per day through small frequent meals all with a protein source.   Activity restrictions: none.   Recommended patient be sure to get at least 70 grams of protein per day by eating small, frequent meals all with high lean protein choices. Be sure to limit/cut back on daily carbohydrate intake. Discussed with the patient the recommended amount of water per day to intake- half of body weight in ounces. Reviewed vitamin requirements. Be sure to do routine exercise, 150 minutes per week minimum, including both cardio and strength training.     Instructions / Recommendations: dietary counseling recommended, recommended a daily protein intake of  grams, vitamin supplement(s) recommended, recommended exercising at least 150 minutes per week, behavior modifications recommended and instructed to call the office for concerns, questions, or problems.     The patient was instructed to follow up in 3 months.     Total time spent during this encounter today was 35 minutes

## 2024-04-16 ENCOUNTER — CLINICAL SUPPORT (OUTPATIENT)
Dept: SURGERY | Facility: CLINIC | Age: 69
End: 2024-04-16
Payer: MEDICARE

## 2024-04-16 NOTE — PROGRESS NOTES
Pt presents for suture removal LLE.  Incision appears to be healed with wound edges well approximated. Upon removal of 1st suture, incision opens slightly.  All sutures removed.  Steri strips applied.

## 2024-07-10 ENCOUNTER — OFFICE VISIT (OUTPATIENT)
Dept: BARIATRICS/WEIGHT MGMT | Facility: CLINIC | Age: 69
End: 2024-07-10
Payer: MEDICARE

## 2024-07-10 VITALS
SYSTOLIC BLOOD PRESSURE: 116 MMHG | BODY MASS INDEX: 33.32 KG/M2 | HEART RATE: 61 BPM | WEIGHT: 200 LBS | TEMPERATURE: 98 F | DIASTOLIC BLOOD PRESSURE: 76 MMHG | HEIGHT: 65 IN

## 2024-07-10 DIAGNOSIS — E11.69 TYPE 2 DIABETES MELLITUS WITH OBESITY: ICD-10-CM

## 2024-07-10 DIAGNOSIS — K21.9 GASTROESOPHAGEAL REFLUX DISEASE, UNSPECIFIED WHETHER ESOPHAGITIS PRESENT: ICD-10-CM

## 2024-07-10 DIAGNOSIS — K59.09 OTHER CONSTIPATION: ICD-10-CM

## 2024-07-10 DIAGNOSIS — R53.82 CHRONIC FATIGUE: ICD-10-CM

## 2024-07-10 DIAGNOSIS — E66.9 TYPE 2 DIABETES MELLITUS WITH OBESITY: ICD-10-CM

## 2024-07-10 DIAGNOSIS — G47.33 OSA (OBSTRUCTIVE SLEEP APNEA): ICD-10-CM

## 2024-07-10 DIAGNOSIS — E66.9 OBESITY, CLASS I, BMI 30-34.9: Primary | ICD-10-CM

## 2024-07-10 PROBLEM — E66.812 OBESITY, CLASS II, BMI 35-39.9: Status: RESOLVED | Noted: 2024-04-10 | Resolved: 2024-07-10

## 2024-07-10 PROCEDURE — 99214 OFFICE O/P EST MOD 30 MIN: CPT | Performed by: NURSE PRACTITIONER

## 2024-07-10 RX ORDER — CELECOXIB 200 MG/1
1 CAPSULE ORAL EVERY 12 HOURS SCHEDULED
COMMUNITY
Start: 2024-06-05

## 2024-07-10 RX ORDER — HYDROGEN PEROXIDE 2.65 ML/100ML
1 LIQUID ORAL; TOPICAL EVERY 12 HOURS SCHEDULED
COMMUNITY
Start: 2024-06-06

## 2024-07-10 RX ORDER — PREGABALIN 75 MG/1
75 CAPSULE ORAL 2 TIMES DAILY
COMMUNITY
Start: 2024-06-14

## 2024-07-10 NOTE — PROGRESS NOTES
[facility] BARIATRIC SURGERY  [address]  [place] [id number] [phone]  [address]  [place] [id number] [phone]  Dept: [phone]  7/10/2024      Angelina Narayan.  [id number]  [id number]  1955  female      Chief Complaint   Patient presents with    Follow-up     Follow up sleeve       BH Post-Op Bariatric Surgery:   Angelina Narayan is status post Laparoscopic Sleeve procedure, performed on 6/12/2017     HPI:     Today's weight is 90.7 kg (200 lb) pounds, today's BMI is Body mass index is 33.28 kg/m²., she has a loss of 22 pounds since the last visit and her weight loss since surgery is 72 pounds. The patient reports a decreased portion size and loss of appetite.    Angelina Narayan denies nausea, vomiting, reflux and reports that she is doing well     Diet and Exercise: Diet history reviewed and discussed with the patient. Weight loss/gains to date discussed with the patient. The patient states they are eating 60 grams of protein per day. She reports eating 3 meals per day, a typical portion size of 1/2 cup, eating 1-2 snacks per day, drinking 5-6 or more 8-oz. glasses of water per day, no carbonated beverage consumption and exercising regularly- PT since knee replacement    Supplements: bariatric specific MTV with iron and calcium.     Review of Systems   Constitutional:  Positive for appetite change. Negative for fatigue and unexpected weight change.   HENT: Negative.     Eyes: Negative.    Respiratory: Negative.     Cardiovascular: Negative.  Negative for leg swelling.   Gastrointestinal:  Negative for abdominal distention, abdominal pain, constipation, diarrhea, nausea and vomiting.   Genitourinary:  Negative for difficulty urinating, frequency and urgency.   Musculoskeletal:  Negative for back pain.   Skin: Negative.    Psychiatric/Behavioral: Negative.     All other systems reviewed and are negative.      Patient Active  Problem List   Diagnosis    Obesity, Class I, BMI 30-34.9    RIVERA (obstructive sleep apnea)    Type 2 diabetes mellitus with obesity    Chronic fatigue    Edema    Multiple joint pain    Depressive disorder    Hypothyroidism    Gastric polyp    Dietary counseling    Gastroesophageal reflux disease    Rheumatoid arthritis    Arthritis of knee    Cellulitis    Neuropathy    Diabetes    Other constipation       Past Medical History:   Diagnosis Date    Anemia     Cellulitis 06/2022    DDD (degenerative disc disease), lumbar     Diabetes mellitus     Disease of thyroid gland     HYPOTHYROID    Fatigue     GERD (gastroesophageal reflux disease)     Heartburn     Hiatal hernia     Hypertension     Joint pain     RIVERA (obstructive sleep apnea)     CPAP    RLS (restless legs syndrome)        The following portions of the patient's history were reviewed and updated as appropriate: allergies, current medications, past family history, past medical history, past social history, past surgical history, and problem list.    Vitals:    07/10/24 1144   BP: 116/76   Pulse: 61   Temp: 98 °F (36.7 °C)       Physical Exam  Vitals and nursing note reviewed.   Constitutional:       Appearance: She is well-developed.   Neck:      Thyroid: No thyromegaly.   Cardiovascular:      Rate and Rhythm: Normal rate.   Pulmonary:      Effort: Pulmonary effort is normal. No respiratory distress.   Abdominal:      Palpations: Abdomen is soft.   Musculoskeletal:         General: No tenderness.   Skin:     General: Skin is warm and dry.   Neurological:      Mental Status: She is alert.   Psychiatric:         Behavior: Behavior normal.         Assessment:   Post-op, the patient is doing well.     Encounter Diagnoses   Name Primary?    Obesity, Class I, BMI 30-34.9 Yes    Gastroesophageal reflux disease, unspecified whether esophagitis present     Type 2 diabetes mellitus with obesity     RIVERA (obstructive sleep apnea)     Chronic fatigue     Other  constipation        Plan:   Continue on tirzepatide 10m weekly.   Encouraged patient to be sure to get plenty of lean protein per day through small frequent meals all with a protein source.   Activity restrictions: none.   Recommended patient be sure to get at least 70 grams of protein per day by eating small, frequent meals all with high lean protein choices. Be sure to limit/cut back on daily carbohydrate intake. Discussed with the patient the recommended amount of water per day to intake- half of body weight in ounces. Reviewed vitamin requirements. Be sure to do routine exercise, 150 minutes per week minimum, including both cardio and strength training.     Instructions / Recommendations: dietary counseling recommended, recommended a daily protein intake of  grams, vitamin supplement(s) recommended, recommended exercising at least 150 minutes per week, behavior modifications recommended and instructed to call the office for concerns, questions, or problems.     The patient was instructed to follow up in 3 months .      Total time spent during this encounter today was 35 minutes

## 2024-10-10 ENCOUNTER — OFFICE VISIT (OUTPATIENT)
Dept: BARIATRICS/WEIGHT MGMT | Facility: CLINIC | Age: 69
End: 2024-10-10
Payer: MEDICARE

## 2024-10-10 VITALS
TEMPERATURE: 98.1 F | WEIGHT: 177 LBS | HEIGHT: 65 IN | SYSTOLIC BLOOD PRESSURE: 118 MMHG | BODY MASS INDEX: 29.49 KG/M2 | DIASTOLIC BLOOD PRESSURE: 72 MMHG | HEART RATE: 79 BPM

## 2024-10-10 DIAGNOSIS — R53.82 CHRONIC FATIGUE: ICD-10-CM

## 2024-10-10 DIAGNOSIS — E11.69 TYPE 2 DIABETES MELLITUS WITH OBESITY: ICD-10-CM

## 2024-10-10 DIAGNOSIS — E66.811 OBESITY, CLASS I, BMI 30-34.9: Primary | ICD-10-CM

## 2024-10-10 DIAGNOSIS — G47.33 OSA (OBSTRUCTIVE SLEEP APNEA): ICD-10-CM

## 2024-10-10 DIAGNOSIS — K21.9 GASTROESOPHAGEAL REFLUX DISEASE, UNSPECIFIED WHETHER ESOPHAGITIS PRESENT: ICD-10-CM

## 2024-10-10 DIAGNOSIS — E66.9 TYPE 2 DIABETES MELLITUS WITH OBESITY: ICD-10-CM

## 2024-10-10 PROCEDURE — 1159F MED LIST DOCD IN RCRD: CPT | Performed by: NURSE PRACTITIONER

## 2024-10-10 PROCEDURE — 1160F RVW MEDS BY RX/DR IN RCRD: CPT | Performed by: NURSE PRACTITIONER

## 2024-10-10 PROCEDURE — 99214 OFFICE O/P EST MOD 30 MIN: CPT | Performed by: NURSE PRACTITIONER

## 2024-10-10 NOTE — PROGRESS NOTES
[facility] BARIATRIC [facility] BARIATRIC SURGERY  [address]  [facility] [id number] [phone]  [address]  [facility] [id number] [phone]  Dept: [phone]  10/10/2024      Angelina Narayan.  [id number]  [id number]  1955  female      Chief Complaint   Patient presents with   • Follow-up     p Lorraine        Post-Op Bariatric Surgery:   Angelina Narayan is status post Laparoscopic Sleeve procedure, performed on 6/12/2017 who is taking mounjaro at 10mg weekly for diabetes    HPI:       Today's weight is 80.3 kg (177 lb) pounds, today's BMI is Body mass index is 29.45 kg/m²., she has a loss of 23 pounds since the last visit and her weight loss since surgery is 95 pounds. The patient reports a decreased portion size and loss of appetite.    Angelina Narayan denies nausea, vomiting, reflux and reports that she is doing well. She always has a protein shake and coffee in the mornings. She may have a devotion protein powder later in the day. She eat the fage yogurt and cottage cheese. She can't tolerate tomato based food as much.      Diet and Exercise: Diet history reviewed and discussed with the patient. Weight loss/gains to date discussed with the patient. The patient states they are eating  grams of protein per day. She reports eating 3 meals per day, a typical portion size of 1/2 cup, eating 1-2 snacks per day, drinking 5-6 or more 8-oz. glasses of water per day, no carbonated beverage consumption and exercising regularly.     Supplements: bariatric specific MTV with iron and calcium.     Review of Systems   Constitutional:  Positive for appetite change. Negative for fatigue and unexpected weight change.   HENT: Negative.     Eyes: Negative.    Respiratory: Negative.     Cardiovascular: Negative.  Negative for leg swelling.   Gastrointestinal:  Negative for abdominal distention, abdominal pain, constipation, diarrhea, nausea and vomiting.    Genitourinary:  Negative for difficulty urinating, frequency and urgency.   Musculoskeletal:  Negative for back pain.   Skin: Negative.    Psychiatric/Behavioral: Negative.     All other systems reviewed and are negative.    Patient Active Problem List   Diagnosis   • Obesity, Class I, BMI 30-34.9   • RIVERA (obstructive sleep apnea)   • Type 2 diabetes mellitus with obesity   • Chronic fatigue   • Edema   • Multiple joint pain   • Depressive disorder   • Hypothyroidism   • Gastric polyp   • Dietary counseling   • Gastroesophageal reflux disease   • Rheumatoid arthritis   • Arthritis of knee   • Cellulitis   • Neuropathy   • Diabetes   • Other constipation       Past Medical History:   Diagnosis Date   • Anemia    • Cellulitis 06/2022   • DDD (degenerative disc disease), lumbar    • Diabetes mellitus    • Disease of thyroid gland     HYPOTHYROID   • Fatigue    • GERD (gastroesophageal reflux disease)    • Heartburn    • Hiatal hernia    • Hypertension    • Joint pain    • RIVERA (obstructive sleep apnea)     CPAP   • RLS (restless legs syndrome)        The following portions of the patient's history were reviewed and updated as appropriate: allergies, current medications, past family history, past medical history, past social history, past surgical history, and problem list.    Vitals:    10/10/24 0956   BP: 118/72   Pulse: 79   Temp: 98.1 °F (36.7 °C)       Physical Exam  Vitals and nursing note reviewed.   Constitutional:       Appearance: She is well-developed.   Neck:      Thyroid: No thyromegaly.   Cardiovascular:      Rate and Rhythm: Normal rate.   Pulmonary:      Effort: Pulmonary effort is normal. No respiratory distress.   Abdominal:      Palpations: Abdomen is soft.   Musculoskeletal:         General: No tenderness.   Skin:     General: Skin is warm and dry.   Neurological:      Mental Status: She is alert.   Psychiatric:         Behavior: Behavior normal.     Assessment:   Post-op, the patient is doing well.      Encounter Diagnoses   Name Primary?   • Obesity, Class I, BMI 30-34.9 Yes   • Gastroesophageal reflux disease, unspecified whether esophagitis present    • RIVERA (obstructive sleep apnea)    • Chronic fatigue    • Type 2 diabetes mellitus with obesity        Plan:   Discussed posibility of spacing dosing on zepbound to every 8-9 days or to decrease her dose back to 5mg if she notes having a harder time getting enough nutrition from solids or noting heartburn, reflux, or any hypoglycemic episodes  Encouraged patient to be sure to get plenty of lean protein per day through small frequent meals all with a protein source.   Activity restrictions: none.   Recommended patient be sure to get at least 70 grams of protein per day by eating small, frequent meals all with high lean protein choices. Be sure to limit/cut back on daily carbohydrate intake. Discussed with the patient the recommended amount of water per day to intake- half of body weight in ounces. Reviewed vitamin requirements. Be sure to do routine exercise, 150 minutes per week minimum, including both cardio and strength training.     Instructions / Recommendations: dietary counseling recommended, recommended a daily protein intake of  grams, vitamin supplement(s) recommended, recommended exercising at least 150 minutes per week, behavior modifications recommended and instructed to call the office for concerns, questions, or problems.     The patient was instructed to follow up in 3 months .     . Total time spent during this encounter today was 25 minutes

## 2024-11-05 ENCOUNTER — TRANSCRIBE ORDERS (OUTPATIENT)
Dept: ADMINISTRATIVE | Facility: HOSPITAL | Age: 69
End: 2024-11-05
Payer: COMMERCIAL

## 2024-11-05 ENCOUNTER — LAB (OUTPATIENT)
Dept: LAB | Facility: HOSPITAL | Age: 69
End: 2024-11-05
Payer: MEDICARE

## 2024-11-05 DIAGNOSIS — E10.618 TYPE 1 DIABETES MELLITUS WITH OTHER DIABETIC ARTHROPATHY: ICD-10-CM

## 2024-11-05 LAB
25(OH)D3 SERPL-MCNC: 88.7 NG/ML (ref 30–100)
CHOLEST SERPL-MCNC: 151 MG/DL (ref 0–200)
ESTRADIOL SERPL HS-MCNC: 58.6 PG/ML
FSH SERPL-ACNC: 19.4 MIU/ML
HDLC SERPL-MCNC: 50 MG/DL (ref 40–60)
LDLC SERPL CALC-MCNC: 89 MG/DL (ref 0–100)
LDLC/HDLC SERPL: 1.8 {RATIO}
T3FREE SERPL-MCNC: 2.35 PG/ML (ref 2–4.4)
T4 FREE SERPL-MCNC: 1.57 NG/DL (ref 0.92–1.68)
TESTOST SERPL-MCNC: 239 NG/DL (ref 2.9–40.8)
TRIGL SERPL-MCNC: 56 MG/DL (ref 0–150)
VIT B12 BLD-MCNC: 1052 PG/ML (ref 211–946)
VLDLC SERPL-MCNC: 12 MG/DL (ref 5–40)

## 2024-11-05 PROCEDURE — 80061 LIPID PANEL: CPT

## 2024-11-05 PROCEDURE — 82607 VITAMIN B-12: CPT

## 2024-11-05 PROCEDURE — 84481 FREE ASSAY (FT-3): CPT

## 2024-11-05 PROCEDURE — 82306 VITAMIN D 25 HYDROXY: CPT

## 2024-11-05 PROCEDURE — 84439 ASSAY OF FREE THYROXINE: CPT

## 2024-11-05 PROCEDURE — 82670 ASSAY OF TOTAL ESTRADIOL: CPT

## 2024-11-05 PROCEDURE — 84403 ASSAY OF TOTAL TESTOSTERONE: CPT

## 2024-11-05 PROCEDURE — 36415 COLL VENOUS BLD VENIPUNCTURE: CPT

## 2024-11-05 PROCEDURE — 83001 ASSAY OF GONADOTROPIN (FSH): CPT

## 2024-11-06 ENCOUNTER — TRANSCRIBE ORDERS (OUTPATIENT)
Dept: ADMINISTRATIVE | Facility: HOSPITAL | Age: 69
End: 2024-11-06
Payer: COMMERCIAL

## 2024-11-06 DIAGNOSIS — Z12.31 VISIT FOR SCREENING MAMMOGRAM: Primary | ICD-10-CM

## 2024-12-20 ENCOUNTER — HOSPITAL ENCOUNTER (OUTPATIENT)
Dept: MAMMOGRAPHY | Facility: HOSPITAL | Age: 69
Discharge: HOME | End: 2024-12-20
Admitting: NURSE PRACTITIONER
Payer: MEDICARE

## 2024-12-20 DIAGNOSIS — Z12.31 VISIT FOR SCREENING MAMMOGRAM: ICD-10-CM

## 2024-12-20 PROCEDURE — 77067 SCR MAMMO BI INCL CAD: CPT

## 2024-12-20 PROCEDURE — 77063 BREAST TOMOSYNTHESIS BI: CPT

## 2025-01-09 ENCOUNTER — OFFICE VISIT (OUTPATIENT)
Dept: BARIATRICS/WEIGHT MGMT | Facility: CLINIC | Age: 70
End: 2025-01-09
Payer: MEDICARE

## 2025-01-09 VITALS
BODY MASS INDEX: 28.32 KG/M2 | DIASTOLIC BLOOD PRESSURE: 90 MMHG | WEIGHT: 170 LBS | HEART RATE: 80 BPM | HEIGHT: 65 IN | SYSTOLIC BLOOD PRESSURE: 140 MMHG | TEMPERATURE: 97.7 F

## 2025-01-09 DIAGNOSIS — G47.33 OSA (OBSTRUCTIVE SLEEP APNEA): ICD-10-CM

## 2025-01-09 DIAGNOSIS — E11.69 TYPE 2 DIABETES MELLITUS WITH OBESITY: ICD-10-CM

## 2025-01-09 DIAGNOSIS — K21.9 GASTROESOPHAGEAL REFLUX DISEASE, UNSPECIFIED WHETHER ESOPHAGITIS PRESENT: ICD-10-CM

## 2025-01-09 DIAGNOSIS — E66.3 OVERWEIGHT (BMI 25.0-29.9): Primary | ICD-10-CM

## 2025-01-09 DIAGNOSIS — E66.9 TYPE 2 DIABETES MELLITUS WITH OBESITY: ICD-10-CM

## 2025-01-09 PROBLEM — E66.811 OBESITY, CLASS I, BMI 30-34.9: Status: RESOLVED | Noted: 2017-02-21 | Resolved: 2025-01-09

## 2025-01-09 RX ORDER — METHOTREXATE 2.5 MG/1
20 TABLET ORAL WEEKLY
COMMUNITY

## 2025-01-09 NOTE — PROGRESS NOTES
[facility] BARIATRIC [facility] BARIATRIC SURGERY  [address]  [facility] [id number] [phone]  [address]  [facility] [id number] [phone]  Dept: [phone]  1/9/2025      Angelina Narayan.  [id number]  [id number]  1955  female      Chief Complaint   Patient presents with    Follow-up     Fup RX       BH Post-Op Bariatric Surgery:   Angelina Narayan is status post Laparoscopic Sleeve procedure, performed on 6/12/2017 who has been taking mounjaro for DM since December of last year and is currently taking the 10mg dose weekly. She has been on this dose since April.    HPI:     Today's weight is 77.1 kg (170 lb) pounds, today's BMI is Body mass index is 28.29 kg/m²., she has a loss of 7 pounds since the last visit and her weight loss since surgery is 102 pounds. The patient reports a decreased portion size and loss of appetite.    Angelina Narayan denies nausea, vomiting, abdominal pain, constipation, headaches, or reflux     Diet and Exercise: Diet history reviewed and discussed with the patient. Weight loss/gains to date discussed with the patient. The patient states they are eating 60 grams of protein per day. She reports eating 3 meals per day, a typical portion size of 1/2 cup, eating 1 snacks per day, drinking 5-6 or more 8-oz. glasses of water per day, no carbonated beverage consumption and exercising regularly.     Supplements: OTC MTV with iron and calcium.     Review of Systems   Constitutional:  Positive for appetite change. Negative for fatigue and unexpected weight change.   HENT: Negative.     Eyes: Negative.    Respiratory: Negative.     Cardiovascular: Negative.  Negative for leg swelling.   Gastrointestinal:  Negative for abdominal distention, abdominal pain, constipation, diarrhea, nausea and vomiting.   Genitourinary:  Negative for difficulty urinating, frequency and urgency.   Musculoskeletal:  Negative for back pain.   Skin: Negative.     Psychiatric/Behavioral: Negative.     All other systems reviewed and are negative.      Patient Active Problem List   Diagnosis    RIVERA (obstructive sleep apnea)    Type 2 diabetes mellitus with obesity    Chronic fatigue    Edema    Multiple joint pain    Depressive disorder    Hypothyroidism    Gastric polyp    Dietary counseling    Gastroesophageal reflux disease    Rheumatoid arthritis    Arthritis of knee    Cellulitis    Neuropathy    Diabetes    Other constipation    Overweight (BMI 25.0-29.9)       Past Medical History:   Diagnosis Date    Anemia     Cellulitis 06/2022    DDD (degenerative disc disease), lumbar     Diabetes mellitus     Disease of thyroid gland     HYPOTHYROID    Fatigue     GERD (gastroesophageal reflux disease)     Heartburn     Hiatal hernia     Hypertension     Joint pain     RIVERA (obstructive sleep apnea)     CPAP    RLS (restless legs syndrome)        The following portions of the patient's history were reviewed and updated as appropriate: allergies, current medications, past family history, past medical history, past social history, past surgical history, and problem list.    Vitals:    01/09/25 1000   BP: 140/90   Pulse: 80   Temp: 97.7 °F (36.5 °C)       Physical Exam  Vitals and nursing note reviewed.   Constitutional:       Appearance: She is well-developed.   Neck:      Thyroid: No thyromegaly.   Cardiovascular:      Rate and Rhythm: Normal rate.   Pulmonary:      Effort: Pulmonary effort is normal. No respiratory distress.   Abdominal:      Palpations: Abdomen is soft.   Musculoskeletal:         General: No tenderness.   Skin:     General: Skin is warm and dry.   Neurological:      Mental Status: She is alert.   Psychiatric:         Behavior: Behavior normal.         Assessment:   Post-op, the patient is doing well.     Encounter Diagnoses   Name Primary?    Overweight (BMI 25.0-29.9) Yes    Type 2 diabetes mellitus with obesity     Gastroesophageal reflux disease, unspecified  whether esophagitis present     RIVERA (obstructive sleep apnea)        Plan:   Increase mounjaro to 12.5mg weekly  Encouraged patient to be sure to get plenty of lean protein per day through small frequent meals all with a protein source.   Activity restrictions: none.   Recommended patient be sure to get at least 70 grams of protein per day by eating small, frequent meals all with high lean protein choices. Be sure to limit/cut back on daily carbohydrate intake. Discussed with the patient the recommended amount of water per day to intake- half of body weight in ounces. Reviewed vitamin requirements. Be sure to do routine exercise, 150 minutes per week minimum, including both cardio and strength training.     Instructions / Recommendations: dietary counseling recommended, recommended a daily protein intake of  grams, vitamin supplement(s) recommended, recommended exercising at least 150 minutes per week, behavior modifications recommended and instructed to call the office for concerns, questions, or problems.     The patient was instructed to follow up in 3 months .   Total time spent during this encounter today was 25 minutes

## 2025-01-28 ENCOUNTER — TELEPHONE (OUTPATIENT)
Dept: BARIATRICS/WEIGHT MGMT | Facility: CLINIC | Age: 70
End: 2025-01-28
Payer: COMMERCIAL

## 2025-01-28 NOTE — TELEPHONE ENCOUNTER
Patient called in and said that she called the pharmacy in regards to her Mounjaro and was told it was 665.82. She then reached out to her ins and they told her that 578.00 of that total was her deductible. And if we used a different ICD code then she could possibly get the medication at a lower cost. Explained to patient that we used the diagnosis code that was associated with her diagnosis in her chart. And if we did another PA its prob going to say there is an active auth on file. I asked patient if the ins gave her a code that they told her we should be using and she said no they wouldn't tell her.

## 2025-02-19 ENCOUNTER — OFFICE VISIT (OUTPATIENT)
Dept: SLEEP MEDICINE | Facility: HOSPITAL | Age: 70
End: 2025-02-19
Payer: MEDICARE

## 2025-02-19 ENCOUNTER — DOCUMENTATION (OUTPATIENT)
Dept: SLEEP MEDICINE | Facility: HOSPITAL | Age: 70
End: 2025-02-19
Payer: COMMERCIAL

## 2025-02-19 ENCOUNTER — LAB (OUTPATIENT)
Dept: LAB | Facility: HOSPITAL | Age: 70
End: 2025-02-19
Payer: MEDICARE

## 2025-02-19 VITALS
BODY MASS INDEX: 27.82 KG/M2 | HEART RATE: 59 BPM | OXYGEN SATURATION: 98 % | HEIGHT: 65 IN | DIASTOLIC BLOOD PRESSURE: 62 MMHG | SYSTOLIC BLOOD PRESSURE: 100 MMHG | WEIGHT: 167 LBS

## 2025-02-19 DIAGNOSIS — Z02.4 ENCOUNTER FOR CDL (COMMERCIAL DRIVING LICENSE) EXAM: ICD-10-CM

## 2025-02-19 DIAGNOSIS — E61.1 IRON DEFICIENCY: ICD-10-CM

## 2025-02-19 DIAGNOSIS — G47.33 OBSTRUCTIVE SLEEP APNEA: Primary | ICD-10-CM

## 2025-02-19 DIAGNOSIS — G25.81 RESTLESS LEGS SYNDROME (RLS): ICD-10-CM

## 2025-02-19 DIAGNOSIS — Z78.9 DIFFICULTY WITH CPAP NASAL MASK USE: ICD-10-CM

## 2025-02-19 LAB
FERRITIN SERPL-MCNC: 137 NG/ML (ref 13–150)
IRON 24H UR-MRATE: 63 MCG/DL (ref 37–145)
IRON SATN MFR SERPL: 20 % (ref 20–50)
TIBC SERPL-MCNC: 310 MCG/DL (ref 298–536)
TRANSFERRIN SERPL-MCNC: 208 MG/DL (ref 200–360)

## 2025-02-19 PROCEDURE — 36415 COLL VENOUS BLD VENIPUNCTURE: CPT

## 2025-02-19 PROCEDURE — G0463 HOSPITAL OUTPT CLINIC VISIT: HCPCS

## 2025-02-19 PROCEDURE — 83540 ASSAY OF IRON: CPT

## 2025-02-19 PROCEDURE — 84466 ASSAY OF TRANSFERRIN: CPT

## 2025-02-19 PROCEDURE — 82728 ASSAY OF FERRITIN: CPT

## 2025-02-19 NOTE — PROGRESS NOTES
Pressure changes made in sleep center office per WALT ALVES in [employer]:    Device   [id number] - Air[employer] 10 AutoSet For Her   View  02/19/2025, 10:30 AM    by Yoan Cavanaugh    Settings sent to device    Set Mode to AutoSet for Her  Set Min Pressure to 5.0 cmH2O  Set Max Pressure to 10.0 cmH2O  Set EPR to Fulltime  Set EPR level to 3  Set Ramp enable to Off  Previous Settings    Set Mode to AutoSet for Her  Set Min Pressure to 5.0 cmH2O  Set Max Pressure to 15.0 cmH2O  Set EPR to Fulltime  Set EPR level to 3  Set Ramp enable to Off

## 2025-02-19 NOTE — PROGRESS NOTES
[facility]  Telephone: [phone] / Fax: [phone] [place]  Telephone: [phone] / Fax: [phone] Dang Perdomo    PCP: Lesly Anthony, NP-C    Reason for visit: RIVERA f/u    Angelina Narayan is a 69 y.o.female  was last seen at [facility] sleep lab in March 2024. She is unhappy with her nasal pillows, air leaks excessively. She lost almost 100lbs with the aid of Mounjaro. She would like to lose additional 20 lbs. She takes 12.5mg maintenance dose at this time. Device pressures set at 5-15cm H2O. Pre treatment AHI was 24/hr. AHI on treatment is 1.8/hr.    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 She denies sleepiness while driving and gets enough sleep in general.    SH- no tobacco, 0 alcohol, per week, 2-3 caffeine per day    ROS-+nasal congestion, +post nasal drip, rest is negative.    DME [facility]    Current Medications:    Current Outpatient Medications:     celecoxib (CeleBREX) 200 MG capsule, Take 1 capsule by mouth Every 12 (Twelve) Hours., Disp: , Rfl:     Continuous Glucose Sensor (FreeStyle Vitaliy Sensor System), Use Every 10 (Ten) Days., Disp: 1 each, Rfl: 12    EQ Aspirin Adult Low Dose 81 MG EC tablet, Take 1 tablet by mouth Every 12 (Twelve) Hours., Disp: , Rfl:     folic acid (FOLVITE) 1 MG tablet, Take 1 tablet by mouth Daily., Disp: , Rfl:     hydroxychloroquine (PLAQUENIL) 200 MG tablet, Take 1 tablet by mouth 2 (Two) Times a Day., Disp: , Rfl:     levothyroxine (SYNTHROID, LEVOTHROID) 75 MCG tablet, Take 75 mcg by mouth Daily., Disp: , Rfl:     methotrexate 2.5 MG tablet, Take 8 tablets by mouth 1 (One) Time Per Week., Disp: , Rfl:     pramipexole (MIRAPEX) 0.5 MG tablet, Take 1 tablet by mouth Every Night., Disp: 90 tablet, Rfl: 1    pregabalin (LYRICA) 75 MG capsule, Take 1 capsule by mouth 2 (Two) Times a Day., Disp: , Rfl:     Tirzepatide 12.5 MG/0.5ML  "solution auto-injector, Inject 0.5 mL under the skin into the appropriate area as directed 1 (One) Time Per Week., Disp: 2 mL, Rfl: 3   also entered in Sleep Questionnaire    Patient  has a past medical history of Anemia, Cellulitis (06/2022), DDD (degenerative disc disease), lumbar, Diabetes mellitus, Disease of thyroid gland, Fatigue, GERD (gastroesophageal reflux disease), Heartburn, Hiatal hernia, Hypertension, Joint pain, RIVERA (obstructive sleep apnea), and RLS (restless legs syndrome).    I have reviewed the Past Medical History, Past Surgical History, Social History and Family History.    Vital Signs /62   Pulse 59   Ht 165.1 cm (65\")   Wt 75.8 kg (167 lb)   SpO2 98%   BMI 27.79 kg/m²  Body mass index is 27.79 kg/m².    General Alert and oriented. No acute distress noted   Pharynx/Throat Class IV  Mallampati airway, large tongue, no evidence of redundant lateral pharyngeal tissue. No oral lesions. No thrush. Moist mucous membranes.   Head Normocephalic. Symmetrical. Atraumatic.    Nose No septal deviation. No drainage   Chest Wall Normal shape. Symmetric expansion with respiration. No tenderness.   Neck Trachea midline, no thyromegaly or adenopathy    Lungs Clear to auscultation bilaterally. No wheezes. No rhonchi. No rales. Respirations regular, even and unlabored.   Heart Regular rhythm and normal rate. Normal S1 and S2. No murmur   Abdomen Soft, non-tender and non-distended. Normal bowel sounds. No masses.   Extremities Moves all extremities well. No edema   Psychiatric Normal mood and affect.     Testing:  Download last 30 d usage shows average nightly use of 6 hours and 33 minutes on auto CPAP 5-15cm H2O, avg pressure 10.3cm H2O, residual AHI 1.8/hr, leak is 24.3L.min.    Study--5/4/18-  Diagnostic findings: The patient tolerated the home sleep testing with monitoring time of 415 minutes. The data obtained make this a technically adequate study. The apnea hypopneas index(AHI) was 24.5 per sleep " hour.  The AHI during supine position was 26.8 per sleep hour. Mean heart rate of 60.3 BPM.  Snoring was noted 48.9% of sleep time. Lowest oxygen saturation during the study was 72%. Saturation below 89% was noted for 109 mins.      Titration 5/18/18-Overnight titration study from 9:21 PM to 4:06 AM.  Sleep efficiency 96%.  Normal sleep distribution.  AHI 0.3.  Adequate supine sleep and REM sleep.  Oxygen saturation remained above 88%.  No snoring.  CPAP increased from 5-9 cm water pressure.  Maximum sleep at 6 cm water pressure with maximum REM sleep at 8 cm water pressure.  Supine sleep only on the higher pressure settings. Study shows excellent sleep efficiency and normal sleep distribution with CPAP device.  Appropriate pressures 8 cm water pressure.  Home CPAP should be set to the same.       Impression:  1. Obstructive sleep apnea    2. Difficulty with CPAP nasal mask use    3. Restless legs syndrome (RLS)    4. Iron deficiency    5. Encounter for CDL (commercial driving license) exam          Plan:  Reduce CPAP to 5-10cm H2O in view of excessive leaks and significant weight loss to avoid aerophagia.    DME was asked to see patient for mask refitting. I congratulated patient on excellent wt loss.    RLS-reports symptoms earlier in the evening despite Pramipexole 0.5mg. Could be augmentation. She drinks 2-3 cups of caffeine in am, nothing in the afternoon. Check iron profile and ferritin and replenish iron stores if depleted. May need to reduce dose of Pramipexole or try Ropinirole.    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 No objection for CDL renewal with DOT with continued CPAP use. I reviewed latest download data from CPAP with Angelina and filled out a form for DOT.    Thank you for allowing me to participate in your patient's care.      WALT Smith  [facility]  Phone: [phone]      Part of this note may be an electronic transcription/translation of spoken language  to printed text using the Dragon Dictation System.

## 2025-02-21 ENCOUNTER — TELEPHONE (OUTPATIENT)
Dept: SLEEP MEDICINE | Facility: HOSPITAL | Age: 70
End: 2025-02-21
Payer: COMMERCIAL

## 2025-02-21 RX ORDER — ROPINIROLE 0.5 MG/1
0.5 TABLET, FILM COATED ORAL NIGHTLY
Qty: 30 TABLET | Refills: 2 | Status: SHIPPED | OUTPATIENT
Start: 2025-02-21

## 2025-02-21 NOTE — TELEPHONE ENCOUNTER
----- Message from Neda Pendleton sent at 2/21/2025  3:20 PM EST -----  Please let patient know iron stores normal. Not depleted. I would like to try a different medication for her restless legs. I will send it to her local pharmacy. Ask her to take 2-3 hours prior to bedtime.

## 2025-02-21 NOTE — TELEPHONE ENCOUNTER
----- Message from Mila ARZOLA sent at 2/21/2025  3:40 PM EST -----  Regarding: RE: refill  Thank you  ----- Message -----  From: Neda Pendleton APRN  Sent: 2/21/2025   3:25 PM EST  To: Elier Ortiz MD; Aakash Bai Rep; #  Subject: RE: refill                                       I discontinued the Pramipexole, let's try Ropinirole.  I sent script to her pharmacy. I told patient during last office visit that we may try an alternative agent as Pramipexole was not helping much. Her iron labs came back normal. She is already on Lyrica.  ----- Message -----  From: Mila Mcfarland  Sent: 2/21/2025   8:55 AM EST  To: Elier Ortiz MD; WALT Smith  Subject: refill                                           Pharmacy requesting refill:    pramipexole (MIRAPEX) 0.5 MG tablet     Please advise

## 2025-04-17 ENCOUNTER — OFFICE VISIT (OUTPATIENT)
Dept: BARIATRICS/WEIGHT MGMT | Facility: CLINIC | Age: 70
End: 2025-04-17
Payer: MEDICARE

## 2025-04-17 VITALS
HEART RATE: 66 BPM | HEIGHT: 65 IN | BODY MASS INDEX: 26.16 KG/M2 | SYSTOLIC BLOOD PRESSURE: 113 MMHG | TEMPERATURE: 98.1 F | DIASTOLIC BLOOD PRESSURE: 82 MMHG | WEIGHT: 157 LBS

## 2025-04-17 DIAGNOSIS — E66.9 TYPE 2 DIABETES MELLITUS WITH OBESITY: Primary | ICD-10-CM

## 2025-04-17 DIAGNOSIS — E66.3 OVERWEIGHT (BMI 25.0-29.9): ICD-10-CM

## 2025-04-17 DIAGNOSIS — Z71.3 DIETARY COUNSELING: ICD-10-CM

## 2025-04-17 DIAGNOSIS — Z98.84 S/P LAPAROSCOPIC SLEEVE GASTRECTOMY: ICD-10-CM

## 2025-04-17 DIAGNOSIS — E11.69 TYPE 2 DIABETES MELLITUS WITH OBESITY: Primary | ICD-10-CM

## 2025-04-17 NOTE — PROGRESS NOTES
"Chief Complaint  Follow-up (Fup sleeve)    Subjective        Angelina Narayan presents to [facility] BARIATRIC SURGERY  History of Present Illness  Patient follows up for medical weight loss after previous bariatric surgery--patient has sleeve gastrectomy in 2017, has had a wonderful result--patient currently weighs 157 pounds, she weighed 272 pounds before surgery.  Patient is currently on Mounjaro 12.5 mg which helps with her weight but as well as helps with her diabetes.    Patient is not having abdominal pain, dysphagia or reflux.  We discussed possibly moving to a maintenance phase of her medication given the fact that her BMI is now 26.3.  Patient obviously has some psychological concerns of regaining weight if her medication is decreased or taken away.  We discussed this in depth.  Objective   Vital Signs:  /82 (BP Location: Right arm, Patient Position: Sitting, Cuff Size: Adult)   Pulse 66   Temp 98.1 °F (36.7 °C) (Temporal)   Ht 165.1 cm (65\")   Wt 71.2 kg (157 lb)   BMI 26.13 kg/m²   Estimated body mass index is 26.13 kg/m² as calculated from the following:    Height as of this encounter: 165.1 cm (65\").    Weight as of this encounter: 71.2 kg (157 lb).               Physical Exam   Alert, pleasant  No respiratory distress  Abdomen soft  Result Review :                   Assessment and Plan   Diagnoses and all orders for this visit:    1. Type 2 diabetes mellitus with obesity (Primary)    2. Dietary counseling    3. Overweight (BMI 25.0-29.9)    4. S/P laparoscopic sleeve gastrectomy    Other orders  -     Tirzepatide 12.5 MG/0.5ML solution auto-injector; Inject 0.5 mL under the skin into the appropriate area as directed 1 (One) Time Per Week.  Dispense: 2 mL; Refill: 3    Will continue current dosing of 12.5 mg weekly.  I told the patient if her weight goes under 150 pounds that we will have to start weaning.  Patient voiced understanding of this.       I spent 30 minutes caring " for Angelina on this date of service. This time includes time spent by me in the following activities:preparing for the visit, reviewing tests, obtaining and/or reviewing a separately obtained history, performing a medically appropriate examination and/or evaluation , counseling and educating the patient/family/caregiver, documenting information in the medical record, and independently interpreting results and communicating that information with the patient/family/caregiver  Follow Up   No follow-ups on file.  Patient was given instructions and counseling regarding her condition or for health maintenance advice. Please see specific information pulled into the AVS if appropriate.

## 2025-05-02 NOTE — TELEPHONE ENCOUNTER
Chart reviewed- attempted to reach son regarding CCM program, was only able to leave a message.    Spoke to patient regarding lab results. Patient gave a verbal understanding with no further questions.             ----- Message from WALT Reardon sent at 5/20/2021  4:52 PM EDT -----  These look good!

## 2025-05-05 ENCOUNTER — TRANSCRIBE ORDERS (OUTPATIENT)
Dept: ADMINISTRATIVE | Facility: HOSPITAL | Age: 70
End: 2025-05-05
Payer: COMMERCIAL

## 2025-05-05 ENCOUNTER — LAB (OUTPATIENT)
Dept: LAB | Facility: HOSPITAL | Age: 70
End: 2025-05-05
Payer: MEDICARE

## 2025-05-05 DIAGNOSIS — Z79.899 POLYPHARMACY: ICD-10-CM

## 2025-05-05 DIAGNOSIS — M05.79 SEROPOSITIVE RHEUMATOID ARTHRITIS OF MULTIPLE SITES: ICD-10-CM

## 2025-05-05 DIAGNOSIS — M05.79 SEROPOSITIVE RHEUMATOID ARTHRITIS OF MULTIPLE SITES: Primary | ICD-10-CM

## 2025-05-05 LAB
ALBUMIN SERPL-MCNC: 4.4 G/DL (ref 3.5–5.2)
ALBUMIN/GLOB SERPL: 1.8 G/DL
ALP SERPL-CCNC: 88 U/L (ref 39–117)
ALT SERPL W P-5'-P-CCNC: 18 U/L (ref 1–33)
ANION GAP SERPL CALCULATED.3IONS-SCNC: 7.8 MMOL/L (ref 5–15)
AST SERPL-CCNC: 21 U/L (ref 1–32)
BASOPHILS # BLD AUTO: 0.06 10*3/MM3 (ref 0–0.2)
BASOPHILS NFR BLD AUTO: 0.9 % (ref 0–1.5)
BILIRUB SERPL-MCNC: 0.8 MG/DL (ref 0–1.2)
BUN SERPL-MCNC: 20 MG/DL (ref 8–23)
BUN/CREAT SERPL: 27.8 (ref 7–25)
CALCIUM SPEC-SCNC: 9.7 MG/DL (ref 8.6–10.5)
CHLORIDE SERPL-SCNC: 101 MMOL/L (ref 98–107)
CO2 SERPL-SCNC: 28.2 MMOL/L (ref 22–29)
CREAT SERPL-MCNC: 0.72 MG/DL (ref 0.57–1)
CRP SERPL-MCNC: <0.3 MG/DL (ref 0–0.5)
DEPRECATED RDW RBC AUTO: 42.4 FL (ref 37–54)
EGFRCR SERPLBLD CKD-EPI 2021: 91.8 ML/MIN/1.73
EOSINOPHIL # BLD AUTO: 0.07 10*3/MM3 (ref 0–0.4)
EOSINOPHIL NFR BLD AUTO: 1.1 % (ref 0.3–6.2)
ERYTHROCYTE [DISTWIDTH] IN BLOOD BY AUTOMATED COUNT: 12.8 % (ref 12.3–15.4)
ERYTHROCYTE [SEDIMENTATION RATE] IN BLOOD: 11 MM/HR (ref 0–30)
GLOBULIN UR ELPH-MCNC: 2.5 GM/DL
GLUCOSE SERPL-MCNC: 86 MG/DL (ref 65–99)
HCT VFR BLD AUTO: 44.6 % (ref 34–46.6)
HGB BLD-MCNC: 15.5 G/DL (ref 12–15.9)
IMM GRANULOCYTES # BLD AUTO: 0.01 10*3/MM3 (ref 0–0.05)
IMM GRANULOCYTES NFR BLD AUTO: 0.2 % (ref 0–0.5)
LYMPHOCYTES # BLD AUTO: 2.29 10*3/MM3 (ref 0.7–3.1)
LYMPHOCYTES NFR BLD AUTO: 35.6 % (ref 19.6–45.3)
MCH RBC QN AUTO: 31.9 PG (ref 26.6–33)
MCHC RBC AUTO-ENTMCNC: 34.8 G/DL (ref 31.5–35.7)
MCV RBC AUTO: 91.8 FL (ref 79–97)
MONOCYTES # BLD AUTO: 0.52 10*3/MM3 (ref 0.1–0.9)
MONOCYTES NFR BLD AUTO: 8.1 % (ref 5–12)
NEUTROPHILS NFR BLD AUTO: 3.49 10*3/MM3 (ref 1.7–7)
NEUTROPHILS NFR BLD AUTO: 54.1 % (ref 42.7–76)
NRBC BLD AUTO-RTO: 0 /100 WBC (ref 0–0.2)
PLATELET # BLD AUTO: 222 10*3/MM3 (ref 140–450)
PMV BLD AUTO: 11.2 FL (ref 6–12)
POTASSIUM SERPL-SCNC: 4.1 MMOL/L (ref 3.5–5.2)
PROT SERPL-MCNC: 6.9 G/DL (ref 6–8.5)
RBC # BLD AUTO: 4.86 10*6/MM3 (ref 3.77–5.28)
SODIUM SERPL-SCNC: 137 MMOL/L (ref 136–145)
WBC NRBC COR # BLD AUTO: 6.44 10*3/MM3 (ref 3.4–10.8)

## 2025-05-05 PROCEDURE — 36415 COLL VENOUS BLD VENIPUNCTURE: CPT

## 2025-05-05 PROCEDURE — 80053 COMPREHEN METABOLIC PANEL: CPT

## 2025-05-05 PROCEDURE — 85652 RBC SED RATE AUTOMATED: CPT

## 2025-05-05 PROCEDURE — 86140 C-REACTIVE PROTEIN: CPT

## 2025-05-05 PROCEDURE — 85025 COMPLETE CBC W/AUTO DIFF WBC: CPT

## 2025-05-21 ENCOUNTER — HOSPITAL ENCOUNTER (OUTPATIENT)
Dept: GENERAL RADIOLOGY | Facility: HOSPITAL | Age: 70
Discharge: HOME | End: 2025-05-21
Admitting: NURSE PRACTITIONER
Payer: MEDICARE

## 2025-05-21 ENCOUNTER — TRANSCRIBE ORDERS (OUTPATIENT)
Dept: ADMINISTRATIVE | Facility: HOSPITAL | Age: 70
End: 2025-05-21
Payer: COMMERCIAL

## 2025-05-21 DIAGNOSIS — R52 PAIN: Primary | ICD-10-CM

## 2025-05-21 DIAGNOSIS — R52 PAIN: ICD-10-CM

## 2025-05-21 PROCEDURE — 72220 X-RAY EXAM SACRUM TAILBONE: CPT

## 2025-07-14 DIAGNOSIS — E11.69 TYPE 2 DIABETES MELLITUS WITH OBESITY: Primary | ICD-10-CM

## 2025-07-14 DIAGNOSIS — E66.9 TYPE 2 DIABETES MELLITUS WITH OBESITY: Primary | ICD-10-CM

## 2025-08-14 ENCOUNTER — OFFICE VISIT (OUTPATIENT)
Dept: BARIATRICS/WEIGHT MGMT | Facility: CLINIC | Age: 70
End: 2025-08-14
Payer: MEDICARE

## 2025-08-14 VITALS
WEIGHT: 152 LBS | DIASTOLIC BLOOD PRESSURE: 72 MMHG | HEIGHT: 65 IN | HEART RATE: 70 BPM | SYSTOLIC BLOOD PRESSURE: 110 MMHG | TEMPERATURE: 97.7 F | BODY MASS INDEX: 25.33 KG/M2

## 2025-08-14 DIAGNOSIS — E66.3 OVERWEIGHT (BMI 25.0-29.9): Primary | ICD-10-CM

## 2025-08-14 DIAGNOSIS — E66.9 TYPE 2 DIABETES MELLITUS WITH OBESITY: ICD-10-CM

## 2025-08-14 DIAGNOSIS — E11.69 TYPE 2 DIABETES MELLITUS WITH OBESITY: ICD-10-CM

## 2025-08-14 DIAGNOSIS — K21.9 GASTROESOPHAGEAL REFLUX DISEASE, UNSPECIFIED WHETHER ESOPHAGITIS PRESENT: ICD-10-CM

## 2025-08-14 DIAGNOSIS — Z98.84 S/P LAPAROSCOPIC SLEEVE GASTRECTOMY: ICD-10-CM

## 2025-08-14 RX ORDER — TIRZEPATIDE 12.5 MG/.5ML
1 INJECTION, SOLUTION SUBCUTANEOUS WEEKLY
COMMUNITY
Start: 2025-08-07 | End: 2025-08-14

## (undated) DEVICE — GLV SURG SENSICARE GREEN W/ALOE PF LF 6 STRL

## (undated) DEVICE — ENSEAL LAPAROSCOPIC TISSUE SEALER G2 ARTICULATING CURVED JAW FOR USE WITH G2 GENERATOR 5MM DIAMETER 45CM SHAFT LENGTH: Brand: ENSEAL

## (undated) DEVICE — GLV SURG SENSICARE MICRO PF LF 6 STRL

## (undated) DEVICE — VIOLET BRAIDED (POLYGLACTIN 910), SYNTHETIC ABSORBABLE SUTURE: Brand: COATED VICRYL

## (undated) DEVICE — GLV SURG SENSICARE GREEN W/ALOE PF LF 8.5 STRL

## (undated) DEVICE — GLV SURG SENSICARE MICRO PF LF 8.5 STRL

## (undated) DEVICE — ECHELON FLEX POWERED PLUS LONG ARTICULATING ENDOSCOPIC LINEAR CUTTER, 60MM: Brand: ECHELON FLEX

## (undated) DEVICE — APL DUPLOSPRAYER MIS 40CM

## (undated) DEVICE — JELLY,LUBE,STERILE,FLIP TOP,TUBE,4-OZ: Brand: MEDLINE

## (undated) DEVICE — PK OSC LAP SLV 40

## (undated) DEVICE — VISIGI 3D®  CALIBRATION SYSTEM  SIZE 36FR STD W/ BULB: Brand: BOEHRINGER® VISIGI 3D™ SLEEVE GASTRECTOMY CALIBRATION SYSTEM, SIZE 36FR W/BULB

## (undated) DEVICE — ADHS SKIN DERMABOND